# Patient Record
Sex: FEMALE | Race: WHITE | NOT HISPANIC OR LATINO | Employment: FULL TIME | URBAN - METROPOLITAN AREA
[De-identification: names, ages, dates, MRNs, and addresses within clinical notes are randomized per-mention and may not be internally consistent; named-entity substitution may affect disease eponyms.]

---

## 2017-02-03 ENCOUNTER — LAB VISIT (OUTPATIENT)
Dept: LAB | Facility: HOSPITAL | Age: 52
End: 2017-02-03
Attending: INTERNAL MEDICINE
Payer: COMMERCIAL

## 2017-02-03 DIAGNOSIS — Z29.9 PREVENTIVE MEASURE: ICD-10-CM

## 2017-02-03 DIAGNOSIS — Z11.59 NEED FOR HEPATITIS C SCREENING TEST: ICD-10-CM

## 2017-02-03 LAB
25(OH)D3+25(OH)D2 SERPL-MCNC: 47 NG/ML
ALBUMIN SERPL BCP-MCNC: 3.3 G/DL
ALP SERPL-CCNC: 115 U/L
ALT SERPL W/O P-5'-P-CCNC: 13 U/L
ANION GAP SERPL CALC-SCNC: 7 MMOL/L
AST SERPL-CCNC: 17 U/L
BASOPHILS # BLD AUTO: 0.02 K/UL
BASOPHILS NFR BLD: 0.4 %
BILIRUB SERPL-MCNC: 0.6 MG/DL
BUN SERPL-MCNC: 16 MG/DL
CALCIUM SERPL-MCNC: 9.2 MG/DL
CHLORIDE SERPL-SCNC: 100 MMOL/L
CHOLEST/HDLC SERPL: 3.7 {RATIO}
CO2 SERPL-SCNC: 30 MMOL/L
CREAT SERPL-MCNC: 1.2 MG/DL
DIFFERENTIAL METHOD: NORMAL
EOSINOPHIL # BLD AUTO: 0.1 K/UL
EOSINOPHIL NFR BLD: 1.2 %
ERYTHROCYTE [DISTWIDTH] IN BLOOD BY AUTOMATED COUNT: 13.5 %
EST. GFR  (AFRICAN AMERICAN): 59.6 ML/MIN/1.73 M^2
EST. GFR  (NON AFRICAN AMERICAN): 51.7 ML/MIN/1.73 M^2
GLUCOSE SERPL-MCNC: 80 MG/DL
HCT VFR BLD AUTO: 41 %
HDL/CHOLESTEROL RATIO: 27.2 %
HDLC SERPL-MCNC: 250 MG/DL
HDLC SERPL-MCNC: 68 MG/DL
HGB BLD-MCNC: 14.1 G/DL
LDLC SERPL CALC-MCNC: 157 MG/DL
LYMPHOCYTES # BLD AUTO: 1.2 K/UL
LYMPHOCYTES NFR BLD: 22.1 %
MCH RBC QN AUTO: 30.6 PG
MCHC RBC AUTO-ENTMCNC: 34.4 %
MCV RBC AUTO: 89 FL
MONOCYTES # BLD AUTO: 0.5 K/UL
MONOCYTES NFR BLD: 8.6 %
NEUTROPHILS # BLD AUTO: 3.8 K/UL
NEUTROPHILS NFR BLD: 67.5 %
NONHDLC SERPL-MCNC: 182 MG/DL
PLATELET # BLD AUTO: 200 K/UL
PMV BLD AUTO: 9.7 FL
POTASSIUM SERPL-SCNC: 4.4 MMOL/L
PROT SERPL-MCNC: 6.9 G/DL
RBC # BLD AUTO: 4.61 M/UL
SODIUM SERPL-SCNC: 137 MMOL/L
TRIGL SERPL-MCNC: 125 MG/DL
TSH SERPL DL<=0.005 MIU/L-ACNC: 1.74 UIU/ML
WBC # BLD AUTO: 5.61 K/UL

## 2017-02-03 PROCEDURE — 82306 VITAMIN D 25 HYDROXY: CPT

## 2017-02-03 PROCEDURE — 85025 COMPLETE CBC W/AUTO DIFF WBC: CPT

## 2017-02-03 PROCEDURE — 84443 ASSAY THYROID STIM HORMONE: CPT

## 2017-02-03 PROCEDURE — 86803 HEPATITIS C AB TEST: CPT

## 2017-02-03 PROCEDURE — 80053 COMPREHEN METABOLIC PANEL: CPT

## 2017-02-03 PROCEDURE — 36415 COLL VENOUS BLD VENIPUNCTURE: CPT | Mod: PO

## 2017-02-03 PROCEDURE — 80061 LIPID PANEL: CPT

## 2017-02-06 LAB — HCV AB SERPL QL IA: NEGATIVE

## 2017-02-10 ENCOUNTER — OFFICE VISIT (OUTPATIENT)
Dept: INTERNAL MEDICINE | Facility: CLINIC | Age: 52
End: 2017-02-10
Payer: COMMERCIAL

## 2017-02-10 VITALS
DIASTOLIC BLOOD PRESSURE: 74 MMHG | SYSTOLIC BLOOD PRESSURE: 118 MMHG | HEART RATE: 78 BPM | HEIGHT: 64 IN | BODY MASS INDEX: 36.28 KG/M2 | OXYGEN SATURATION: 98 % | TEMPERATURE: 97 F | WEIGHT: 212.5 LBS

## 2017-02-10 DIAGNOSIS — F41.9 ANXIETY AND DEPRESSION: Primary | ICD-10-CM

## 2017-02-10 DIAGNOSIS — G47.33 OSA (OBSTRUCTIVE SLEEP APNEA): ICD-10-CM

## 2017-02-10 DIAGNOSIS — F32.A ANXIETY AND DEPRESSION: Primary | ICD-10-CM

## 2017-02-10 DIAGNOSIS — J45.990 ASTHMA, EXERCISE INDUCED: ICD-10-CM

## 2017-02-10 DIAGNOSIS — E55.9 VITAMIN D DEFICIENCY: ICD-10-CM

## 2017-02-10 DIAGNOSIS — E03.9 ACQUIRED HYPOTHYROIDISM: ICD-10-CM

## 2017-02-10 DIAGNOSIS — Z00.00 ENCOUNTER FOR PREVENTIVE HEALTH EXAMINATION: ICD-10-CM

## 2017-02-10 DIAGNOSIS — E78.00 PURE HYPERCHOLESTEROLEMIA: ICD-10-CM

## 2017-02-10 PROCEDURE — 99396 PREV VISIT EST AGE 40-64: CPT | Mod: S$GLB,,, | Performed by: INTERNAL MEDICINE

## 2017-02-10 PROCEDURE — 99999 PR PBB SHADOW E&M-EST. PATIENT-LVL III: CPT | Mod: PBBFAC,,, | Performed by: INTERNAL MEDICINE
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
## 2017-02-10 NOTE — MR AVS SNAPSHOT
[facility]  [address] [facility] [address] [place] [address]  Phone: [phone]  Fax: [phone]                  Taryn Serrano   2/10/2017 1:00 PM   Office Visit    Description:  Female : 1965   Provider:  Alyce Schroeder MD   Department:  [facility]           Reason for Visit     Follow-up           Diagnoses this Visit        Comments    Anxiety and depression    -  Primary     Acquired hypothyroidism         Asthma, exercise induced         URBAN (obstructive sleep apnea)         Vitamin D deficiency         Encounter for preventive health examination         Pure hypercholesterolemia                To Do List           Future Appointments        Provider Department Dept Phone    3/22/2017 7:45 AM SUM MAMMO1-SCR [facility] [phone]    3/22/2017 8:15 AM Ayan Shaffer MD [facility] OB/ -[phone]    2017 9:30 AM LABORATORY, [facility] [phone]    2017 1:00 PM Alyce Schroeder MD [facility] [phone]      Goals (5 Years of Data)     None      Follow-Up and Disposition     Return in about 6 months (around 8/10/2017).    Follow-up and Disposition History      [facility]     [facility] On [facility] Nurse Care Line -  Assistance  Registered nurses in the [facility] Center provide clinical advisement, health education, appointment booking, and other advisory services.  Call for this free service at [phone].             Medications           Message regarding Medications     Verify the changes and/or additions to your medication regime listed below are the same as discussed with your clinician today.  If any of these changes or additions are incorrect, please notify your healthcare provider.        STOP taking these medications     amoxicillin-clavulanate 500-125mg (AUGMENTIN) 500-125 mg Tab Take 1 tablet (500 mg total) by mouth 2 (two) times daily. 1 Tablet(s) Oral PRN  "Twice a day.           Verify that the below list of medications is an accurate representation of the medications you are currently taking.  If none reported, the list may be blank. If incorrect, please contact your healthcare provider. Carry this list with you in case of emergency.           Current Medications     albuterol (VENTOLIN HFA) 90 mcg/actuation inhaler Inhale 2 puffs into the lungs every 6 (six) hours as needed for Wheezing. 2 Puff(s) Inhalation PRN Four times a day.    buPROPion (WELLBUTRIN XL) 150 MG TB24 tablet TAKE 1 TABLET (150 MG TOTAL) BY MOUTH ONCE DAILY.    cholecalciferol, vitamin D3, 2,000 unit Cap Take 1 capsule (2,000 Units total) by mouth once daily.    fluoxetine (PROZAC) 40 MG capsule TAKE 2 CAPSULES (80 MG TOTAL) BY MOUTH ONCE DAILY.    fluticasone (FLONASE) 50 mcg/actuation nasal spray 2 sprays by Each Nare route once daily.    fluticasone-salmeterol 500-50 mcg/dose (ADVAIR DISKUS) 500-50 mcg/dose DsDv diskus inhaler Inhale 1 puff into the lungs 2 (two) times daily.    levothyroxine (SYNTHROID) 112 MCG tablet Take 1 tablet (112 mcg total) by mouth once daily.           Clinical Reference Information           Your Vitals Were     BP Pulse Temp Height Weight Last Period    118/74 (BP Location: Right arm) 78 97.2 °F (36.2 °C) (Tympanic) 5' 4" (1.626 m) 96.4 kg (212 lb 8.4 oz) 01/07/2015    SpO2 BMI             98% 36.48 kg/m2         Blood Pressure          Most Recent Value    BP  118/74      Allergies as of 2/10/2017     Grass Pollen-perennial Rye, Standard    Horsetail (Equisetum Arvense)    Mold Extracts      Immunizations Administered on Date of Encounter - 2/10/2017     None      Orders Placed During Today's Visit      Normal Orders This Visit    Ambulatory consult to Gynecology     Future Labs/Procedures Expected by Expires    Lipid panel  2/10/2017 2/10/2018    Mammo Digital Screening Bilat with CAD  2/10/2017 2/10/2018      Language Assistance Services     ATTENTION: Language " assistance services are available, free of charge. Please call [phone].      ATENCIÓN: Si habla phani, tiene a gallagher disposición servicios gratuitos de asistencia lingüística. Llame al [phone].     CHÚ Ý: N?u b?n nói Ti?ng Vi?t, có các d?ch v? h? tr? ngôn ng? mi?n phí dành cho b?n. G?i s? [phone].         [facility] complies with applicable Federal civil rights laws and does not discriminate on the basis of race, color, national origin, age, disability, or sex.

## 2017-03-03 ENCOUNTER — PATIENT MESSAGE (OUTPATIENT)
Dept: INTERNAL MEDICINE | Facility: CLINIC | Age: 52
End: 2017-03-03

## 2017-03-03 ENCOUNTER — PATIENT MESSAGE (OUTPATIENT)
Dept: OPHTHALMOLOGY | Facility: CLINIC | Age: 52
End: 2017-03-03

## 2017-03-22 ENCOUNTER — OFFICE VISIT (OUTPATIENT)
Dept: OBSTETRICS AND GYNECOLOGY | Facility: CLINIC | Age: 52
End: 2017-03-22
Payer: COMMERCIAL

## 2017-03-22 ENCOUNTER — HOSPITAL ENCOUNTER (OUTPATIENT)
Dept: RADIOLOGY | Facility: HOSPITAL | Age: 52
Discharge: HOME | End: 2017-03-22
Attending: INTERNAL MEDICINE
Payer: COMMERCIAL

## 2017-03-22 ENCOUNTER — PATIENT MESSAGE (OUTPATIENT)
Dept: INTERNAL MEDICINE | Facility: CLINIC | Age: 52
End: 2017-03-22

## 2017-03-22 VITALS
HEIGHT: 64 IN | DIASTOLIC BLOOD PRESSURE: 68 MMHG | WEIGHT: 216.06 LBS | SYSTOLIC BLOOD PRESSURE: 100 MMHG | BODY MASS INDEX: 36.89 KG/M2

## 2017-03-22 DIAGNOSIS — Z00.00 ENCOUNTER FOR PREVENTIVE HEALTH EXAMINATION: ICD-10-CM

## 2017-03-22 DIAGNOSIS — Z01.419 WOMEN'S ANNUAL ROUTINE GYNECOLOGICAL EXAMINATION: Primary | ICD-10-CM

## 2017-03-22 PROCEDURE — 77067 SCR MAMMO BI INCL CAD: CPT | Mod: 26,,, | Performed by: RADIOLOGY

## 2017-03-22 PROCEDURE — 77067 SCR MAMMO BI INCL CAD: CPT | Mod: TC

## 2017-03-22 PROCEDURE — 99999 PR PBB SHADOW E&M-EST. PATIENT-LVL III: CPT | Mod: PBBFAC,,, | Performed by: OBSTETRICS & GYNECOLOGY

## 2017-03-22 PROCEDURE — 88175 CYTOPATH C/V AUTO FLUID REDO: CPT

## 2017-03-22 PROCEDURE — 99396 PREV VISIT EST AGE 40-64: CPT | Mod: S$GLB,,, | Performed by: OBSTETRICS & GYNECOLOGY
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
## 2017-03-22 NOTE — LETTER
March 22, 2017      Alyce Schroeder MD  [address] [place] [address]           [facility]  [address] [place] [address]  Phone: [phone]  Fax: [phone]          Patient: Taryn Serrano   MR Number: [id number]   YOB: 1965   Date of Visit: 3/22/2017       Dear Dr. Alyce Schroeder:    Thank you for referring Taryn Serrano to me for evaluation. Attached you will find relevant portions of my assessment and plan of care.    If you have questions, please do not hesitate to call me. I look forward to following Taryn Serrano along with you.    Sincerely,    Ayan Shaffer MD    Enclosure  CC:  No Recipients    If you would like to receive this communication electronically, please contact [email or web address] or [phone] to request more information on [facility] Link access.    For providers and/or their staff who would like to refer a patient to [facility], please contact us through our one-stop-shop provider referral line, [facility], at [phone].    If you feel you have received this communication in error or would no longer like to receive these types of communications, please e-mail [email or web address]

## 2017-03-24 ENCOUNTER — HOSPITAL ENCOUNTER (OUTPATIENT)
Dept: RADIOLOGY | Facility: HOSPITAL | Age: 52
Discharge: HOME | End: 2017-03-24
Attending: PEDIATRICS
Payer: COMMERCIAL

## 2017-03-24 ENCOUNTER — OFFICE VISIT (OUTPATIENT)
Dept: INTERNAL MEDICINE | Facility: CLINIC | Age: 52
End: 2017-03-24
Payer: COMMERCIAL

## 2017-03-24 VITALS
HEIGHT: 64 IN | SYSTOLIC BLOOD PRESSURE: 100 MMHG | DIASTOLIC BLOOD PRESSURE: 64 MMHG | WEIGHT: 215.19 LBS | TEMPERATURE: 97 F | BODY MASS INDEX: 36.74 KG/M2

## 2017-03-24 DIAGNOSIS — M25.562 ACUTE PAIN OF LEFT KNEE: ICD-10-CM

## 2017-03-24 DIAGNOSIS — M25.562 ACUTE PAIN OF LEFT KNEE: Primary | ICD-10-CM

## 2017-03-24 PROBLEM — E66.9 OBESITY, CLASS II, BMI 35-39.9, NO COMORBIDITY: Status: ACTIVE | Noted: 2017-03-24

## 2017-03-24 PROBLEM — E66.812 OBESITY, CLASS II, BMI 35-39.9, NO COMORBIDITY: Status: ACTIVE | Noted: 2017-03-24

## 2017-03-24 PROCEDURE — 1160F RVW MEDS BY RX/DR IN RCRD: CPT | Mod: S$GLB,,, | Performed by: PHYSICIAN ASSISTANT

## 2017-03-24 PROCEDURE — 99213 OFFICE O/P EST LOW 20 MIN: CPT | Mod: S$GLB,,, | Performed by: PHYSICIAN ASSISTANT

## 2017-03-24 PROCEDURE — 73560 X-RAY EXAM OF KNEE 1 OR 2: CPT | Mod: 26,59,RT, | Performed by: RADIOLOGY

## 2017-03-24 PROCEDURE — 99999 PR PBB SHADOW E&M-EST. PATIENT-LVL III: CPT | Mod: PBBFAC,,, | Performed by: PHYSICIAN ASSISTANT

## 2017-03-24 PROCEDURE — 73560 X-RAY EXAM OF KNEE 1 OR 2: CPT | Mod: TC,59,PO,RT

## 2017-03-24 PROCEDURE — 73562 X-RAY EXAM OF KNEE 3: CPT | Mod: 26,LT,, | Performed by: RADIOLOGY

## 2017-03-24 RX ORDER — IBUPROFEN 200 MG
200 TABLET ORAL
COMMUNITY
End: 2018-02-12 | Stop reason: ALTCHOICE

## 2017-03-24 RX ORDER — NAPROXEN SODIUM 550 MG/1
550 TABLET ORAL 2 TIMES DAILY WITH MEALS
Qty: 20 TABLET | Refills: 2 | Status: SHIPPED | OUTPATIENT
Start: 2017-03-24 | End: 2017-04-14

## 2017-03-24 NOTE — PROGRESS NOTES
Subjective:       Patient ID: Taryn Serrano is a 51 y.o.W/ female.    Chief Complaint: Knee Pain (Left, X 1 month)    HPI          She comes in by herself today and has the above problem.  About 1 month ago she was climbing into the back of a pickup truck and her left knee became painful and she nearly collapsed and fell.  Several times since then the knee has wanted to give way and collapse.  This occurs mostly when she is coming down stairs as opposed to going upstairs.  Almost immediately, the same day after her injury she started icing her knee.  Also has been taking 600 mg OTC Motrin with some benefit but she worries it make cause stomach irritation if she takes too much.  She probably is not taking a sufficient daily quantity at this point because of that reason.  She she also has been wearing a sturdy knee brace with side stays.  She can't say that that he has been making any noise like pops or clicks.  She doesn't have to sit with her knee in extension to be comfortable.  She can sit with it and a 90° flexed position with no pain.        She gives me a history that several years ago she had her right knee operated on by her arthroscopy to fix her anterior cruciate ligament.  She injured that while she was trying to make her throat when softball.  She has done very fine with that knee since her surgery.      Review of Systems    Otherwise negative concerning the ORTHOPEDIC, MUSCULOSKELETAL, and RHEUMATOLOGIC system review.    Objective:      Physical Exam    KNEES: FROM.  She has full extension and flexion of both knees.  There is no palpable effusion to either knee in the prepatellar or the popliteal area.  She does have a slightly more thickened left knee than the right knee.  There is no increased heat or inflammation.  All signs are negative including patellar, spring, collateral, drawer, and Rosalia's.  There might a been a little clicking just left of the patella on full flexion of the left  knee.    Assessment:       1. Acute pain of left knee        Plan:     1.  Will change her NSAID from Motrin to Anaprox 550 mg every 12 hours with food.  2.  Continue with her knee brace.  She also use hot compresses or heating pad to help increase circulation to heal and knee.  3.  Will get routine knee x-ray today and schedule her for an MRI of that knee in the near future.  Follow-up after that test is done.

## 2017-03-28 ENCOUNTER — TELEPHONE (OUTPATIENT)
Dept: OBSTETRICS AND GYNECOLOGY | Facility: CLINIC | Age: 52
End: 2017-03-28

## 2017-03-28 NOTE — TELEPHONE ENCOUNTER
----- Message from Ayan Shaffer MD sent at 3/28/2017  6:56 AM CDT -----  Please call pt and let her know her pap was normal

## 2017-04-04 ENCOUNTER — HOSPITAL ENCOUNTER (OUTPATIENT)
Dept: RADIOLOGY | Facility: HOSPITAL | Age: 52
Discharge: HOME | End: 2017-04-04
Attending: PEDIATRICS
Payer: COMMERCIAL

## 2017-04-04 DIAGNOSIS — M25.562 ACUTE PAIN OF LEFT KNEE: ICD-10-CM

## 2017-04-04 PROCEDURE — 73721 MRI JNT OF LWR EXTRE W/O DYE: CPT | Mod: TC,LT

## 2017-04-07 ENCOUNTER — OFFICE VISIT (OUTPATIENT)
Dept: ORTHOPEDICS | Facility: CLINIC | Age: 52
End: 2017-04-07
Payer: COMMERCIAL

## 2017-04-07 VITALS
HEIGHT: 64 IN | HEART RATE: 72 BPM | WEIGHT: 215.19 LBS | RESPIRATION RATE: 12 BRPM | SYSTOLIC BLOOD PRESSURE: 109 MMHG | BODY MASS INDEX: 36.74 KG/M2 | DIASTOLIC BLOOD PRESSURE: 77 MMHG

## 2017-04-07 DIAGNOSIS — M25.562 ACUTE PAIN OF LEFT KNEE: ICD-10-CM

## 2017-04-07 DIAGNOSIS — S83.242A TEAR OF MEDIAL MENISCUS OF LEFT KNEE, UNSPECIFIED TEAR TYPE, UNSPECIFIED WHETHER OLD OR CURRENT TEAR, INITIAL ENCOUNTER: ICD-10-CM

## 2017-04-07 DIAGNOSIS — S83.512A LEFT ACL TEAR: Primary | ICD-10-CM

## 2017-04-07 PROCEDURE — 99999 PR PBB SHADOW E&M-EST. PATIENT-LVL IV: CPT | Mod: PBBFAC,,, | Performed by: PHYSICIAN ASSISTANT

## 2017-04-07 PROCEDURE — 99203 OFFICE O/P NEW LOW 30 MIN: CPT | Mod: S$GLB,,, | Performed by: PHYSICIAN ASSISTANT

## 2017-04-07 PROCEDURE — 1160F RVW MEDS BY RX/DR IN RCRD: CPT | Mod: S$GLB,,, | Performed by: PHYSICIAN ASSISTANT

## 2017-04-07 NOTE — LETTER
April 7, 2017      Beto Juan PA-C  [address] [place] [address]           [facility]  [address]  [place] [address]  Phone: [phone]  Fax: [phone]          Patient: Taryn Serrano   MR Number: [id number]   YOB: 1965   Date of Visit: 4/7/2017       Dear Beto Juan:    Thank you for referring Taryn Serrano to me for evaluation. Attached you will find relevant portions of my assessment and plan of care.    If you have questions, please do not hesitate to call me. I look forward to following Taryn Serrano along with you.    Sincerely,    Yeny Rahman PA-C    Enclosure  CC:  No Recipients    If you would like to receive this communication electronically, please contact [email or web address] or [phone] to request more information on [facility] Link access.    For providers and/or their staff who would like to refer a patient to [facility], please contact us through our one-stop-shop provider referral line, [facility], at [phone].    If you feel you have received this communication in error or would no longer like to receive these types of communications, please e-mail [email or web address]

## 2017-04-07 NOTE — PROGRESS NOTES
Subjective:      Patient ID: Taryn Serrano is a 51 y.o. female.    Chief Complaint: Pain and Injury of the Left Knee      HPI: Taryn Serrano  is a 51 y.o. female who c/o Pain and Injury of the Left Knee   for duration of about 6 weeks.  She was climbing into a fifth wheel trailer and stepping up with her left knee.  She had sharp pain medially with that and some instability where the knee gave out on her.  She tells me approximately 2 years ago she injured the left knee when she fell.  Ever since then it has given out on her a few times here and there.  She continues to play tennis, I'll be it not very often per her report.  Pain level today is 0 out of 10.  She does have an occasional intermittent soreness and stiffness.  Alleviating factors include neoprene hinged knee brace.  Aggravating factors is out on her.  She was given a prescription of naproxen, but has not yet started taking it.    Review of Systems   Constitution: Negative for fever.   HENT: Negative for headaches.    Cardiovascular: Negative for chest pain.   Respiratory: Negative for cough and shortness of breath.    Skin: Negative for rash.   Musculoskeletal: Positive for joint pain and stiffness. Negative for joint swelling.        Ocassional Left knee instability   Gastrointestinal: Negative for heartburn.   Neurological: Negative for numbness.         Objective:        General    Nursing note and vitals reviewed.  Constitutional: She is oriented to person, place, and time. She appears well-developed and well-nourished.   HENT:   Head: Normocephalic and atraumatic.   Eyes: EOM are normal.   Cardiovascular: Normal rate and regular rhythm.    Pulmonary/Chest: Effort normal.   Abdominal: Soft.   Neurological: She is alert and oriented to person, place, and time.   Psychiatric: She has a normal mood and affect. Her behavior is normal.             Left Knee Exam     Inspection   Erythema: absent  Swelling: absent  Effusion: absent  Deformity:  deformity  Bruising: absent    Tenderness   The patient tender to palpation of the medial joint line and condyle.    Range of Motion   Extension: normal   Flexion: abnormal Left knee flexion: Pain and stiffness with deep flexion.     Tests   Meniscus   Rosalia:  Medial - positive Lateral - negative  Stability   Lachman: abnormal  - grade IIPCL-Posterior Drawer: normal (0 to 2mm)  MCL - Valgus: normal (0 to 2mm)  LCL - Varus: normal (0 to 2mm)  Patella   Patellar Apprehension: negative  Patellar Tracking: normal  Patellar Grind: negative    Other   Meniscal Cyst: absent  Popliteal (Baker's) Cyst: absent  Sensation: normal    Comments:  Comp soft, cap refill < 2 sec.    Muscle Strength   Left Lower Extremity   Quadriceps:  5/5   Hamstrin/5     Vascular Exam       Edema  Left Lower Leg: absent            Xray:   Left knee from 3/25/17 images and report were reviewed today.  I agree with the radiologist's interpretation.  There is a very minimal joint space narrowing involving the medial compartment of either knee.  No acute fracture or dislocation.  No joint effusion.    MRI:   Left knee from 2017 images and report were reviewed today.  I agree with the radiologist's interpretation.   Complete full-thickness ACL tear.  Possibly chronic.  Intact PCL.  Chondromalacia medial knee compartment.  Medial meniscal degeneration with near complete full thickness radial tear of the posterior horn root attachment.  Popliteal fossa cyst.  Prepatellar edema.  Small joint effusion.      Assessment:       Encounter Diagnoses   Name Primary?    Left ACL tear Yes    Tear of medial meniscus of left knee, unspecified tear type, unspecified whether old or current tear, initial encounter     Acute pain of left knee           Plan:       Taryn was seen today for pain and injury.    Diagnoses and all orders for this visit:    Left ACL tear  -     Ambulatory Referral to Physical/Occupational Therapy    Tear of medial meniscus of  left knee, unspecified tear type, unspecified whether old or current tear, initial encounter  -     Ambulatory Referral to Physical/Occupational Therapy    Acute pain of left knee  -     Ambulatory Referral to Physical/Occupational Therapy    Ms. Centeno comes in today for discussion of her left knee.  This is a new problem to me.  She does have some acute knee pain symptoms with positive findings for meniscal tear on MRI and exam.  She also has some left knee instability with evidence of an anterior cruciate ligament disruption on MRI.  Given her age and activity level, she would like to try to avoid any sort of anterior cruciate ligament reconstruction.  We also discussed the possibility of a left knee scope for partial medial meniscectomy.  She would like to try and avoid surgery for that as well if at all possible.  She is more interested in conservative treatment options, especially given that she is not having much pain at this time.  Recommend she continue with her knee brace.  She will start the prescription for naproxen that she was given.  She should take it with food.  I will get her started in formal physical therapy for aggressive quad strengthening, range of motion, as well as modalities for pain.  I will have her follow-up with Dr. Daly and Brit Green PA-C in about a month for further discussion on whether or not she would benefit from an anterior cruciate ligament reconstruction versus a partial meniscectomy on the medial side.  In the meantime, she will pay attention to how often the knee is giving out on her and what activities she is limited by secondary to instability.  Patient verbalizes understanding and agrees with the above.    Return in about 4 weeks (around 5/5/2017) for surgical consult with Dr. Daly and Brit Green PA-C.          The patient understands, chooses and consents to this plan and accepts all   the risks which include but are not limited to the risks mentioned above.      Disclaimer: This note was prepared using a voice recognition system and is likely to have sound alike errors within the text.

## 2017-05-15 ENCOUNTER — OFFICE VISIT (OUTPATIENT)
Dept: ORTHOPEDICS | Facility: CLINIC | Age: 52
End: 2017-05-15
Payer: COMMERCIAL

## 2017-05-15 VITALS
SYSTOLIC BLOOD PRESSURE: 96 MMHG | BODY MASS INDEX: 36.74 KG/M2 | DIASTOLIC BLOOD PRESSURE: 66 MMHG | WEIGHT: 215.19 LBS | HEIGHT: 64 IN | HEART RATE: 72 BPM

## 2017-05-15 DIAGNOSIS — S83.512A LEFT ACL TEAR: Primary | ICD-10-CM

## 2017-05-15 DIAGNOSIS — S83.242A TEAR OF MEDIAL MENISCUS OF LEFT KNEE, UNSPECIFIED TEAR TYPE, UNSPECIFIED WHETHER OLD OR CURRENT TEAR, INITIAL ENCOUNTER: ICD-10-CM

## 2017-05-15 DIAGNOSIS — M71.22 BAKERS CYST, LEFT: ICD-10-CM

## 2017-05-15 PROCEDURE — 99999 PR PBB SHADOW E&M-EST. PATIENT-LVL III: CPT | Mod: PBBFAC,,, | Performed by: ORTHOPAEDIC SURGERY

## 2017-05-15 PROCEDURE — 99214 OFFICE O/P EST MOD 30 MIN: CPT | Mod: S$GLB,,, | Performed by: ORTHOPAEDIC SURGERY

## 2017-05-15 PROCEDURE — 1160F RVW MEDS BY RX/DR IN RCRD: CPT | Mod: S$GLB,,, | Performed by: ORTHOPAEDIC SURGERY

## 2017-05-15 NOTE — MR AVS SNAPSHOT
[facility]  [address]  [place] [address]  Phone: [phone]  Fax: [phone]                  Taryn Serrano   5/15/2017 9:45 AM   Office Visit    Description:  Female : 1965   Provider:  Kenan Daly MD   Department:  [facility]           Reason for Visit     Left Knee - Pain           Diagnoses this Visit        Comments    Left ACL tear    -  Primary     Tear of medial meniscus of left knee, unspecified tear type, unspecified whether old or current tear, initial encounter         Bakers cyst, left                To Do List           Future Appointments        Provider Department Dept Phone    8/3/2017 9:30 AM ORTHOPEDICS NURSE, [place] [facility] [phone]    2017 9:30 AM LABORATORY, [place] [facility] [phone]    2017 1:00 PM Alyce Schroeder MD [facility] Internal Medicine [phone]      Goals (5 Years of Data)     None      Follow-Up and Disposition     Return in about 3 months (around 2017).    Follow-up and Disposition History      [facility]     [facility] Nurse Care Line -  Assistance  Unless otherwise directed by your provider, please contact [facility], our nurse care line that is available for  assistance.     Registered nurses in the [facility] Center provide: appointment scheduling, clinical advisement, health education, and other advisory services.  Call: [phone] (toll free)               Medications           Message regarding Medications     Verify the changes and/or additions to your medication regime listed below are the same as discussed with your clinician today.  If any of these changes or additions are incorrect, please notify your healthcare provider.             Verify that the below list of medications is an accurate representation of the medications you are currently taking.  If none reported, the list may be blank. If incorrect, please  "contact your healthcare provider. Carry this list with you in case of emergency.           Current Medications     albuterol (VENTOLIN HFA) 90 mcg/actuation inhaler Inhale 2 puffs into the lungs every 6 (six) hours as needed for Wheezing. 2 Puff(s) Inhalation PRN Four times a day.    buPROPion (WELLBUTRIN XL) 150 MG TB24 tablet TAKE 1 TABLET (150 MG TOTAL) BY MOUTH ONCE DAILY.    cholecalciferol, vitamin D3, 2,000 unit Cap Take 1 capsule (2,000 Units total) by mouth once daily.    fluoxetine (PROZAC) 40 MG capsule TAKE 2 CAPSULES (80 MG TOTAL) BY MOUTH ONCE DAILY.    fluticasone (FLONASE) 50 mcg/actuation nasal spray 2 sprays by Each Nare route once daily.    fluticasone-salmeterol 500-50 mcg/dose (ADVAIR DISKUS) 500-50 mcg/dose DsDv diskus inhaler Inhale 1 puff into the lungs 2 (two) times daily.    ibuprofen (ADVIL,MOTRIN) 200 MG tablet Take 200 mg by mouth as needed for Pain.           Clinical Reference Information           Your Vitals Were     BP Pulse Height Weight Last Period BMI    96/66 72 5' 4" (1.626 m) 97.6 kg (215 lb 2.7 oz) 01/07/2015 36.93 kg/m2      Blood Pressure          Most Recent Value    BP  96/66      Allergies as of 5/15/2017     Grass Pollen-perennial Rye, Standard    Horsetail (Equisetum Arvense)    Mold Extracts      Immunizations Administered on Date of Encounter - 5/15/2017     None      Language Assistance Services     ATTENTION: Language assistance services are available, free of charge. Please call [phone].      ATENCIÓN: Si angela jeronimo, tiene a gallagher disposición servicios gratuitos de asistencia lingüística. Llame al [phone].     CHÚ Ý: N?u b?n nói Ti?ng Vi?t, có các d?ch v? h? tr? ngôn ng? mi?n phí dành cho b?n. G?i s? [phone].         [facility] complies with applicable Federal civil rights laws and does not discriminate on the basis of race, color, national origin, age, disability, or sex.        "

## 2017-05-15 NOTE — PROGRESS NOTES
Subjective:      Patient ID: Taryn Serrano is a 51 y.o. female.    Chief Complaint: Pain of the Left Knee      HPI: Taryn Serrano  is a 51 y.o. female who c/o Pain of the Left Knee   for duration of about 6 weeks.  She was climbing into a fifth wheel trailer and stepping up with her left knee.  She had sharp pain medially with that and some instability where the knee gave out on her.  She tells me approximately 2 years ago she injured the left knee when she fell.  Ever since then it has given out on her a few times here and there. A recent MRI shows an ACL tear and medial meniscus tear. She does not trust her knee. She used to play tennis.      Review of Systems   Constitution: Negative for fever.   HENT: Negative for headaches.    Cardiovascular: Negative for chest pain.   Respiratory: Negative for cough and shortness of breath.    Skin: Negative for rash.   Musculoskeletal: Positive for joint pain and stiffness. Negative for joint swelling.        Ocassional Left knee instability   Gastrointestinal: Negative for heartburn.   Neurological: Negative for numbness.         Objective:        General    Nursing note and vitals reviewed.  Constitutional: She is oriented to person, place, and time. She appears well-developed and well-nourished.   HENT:   Head: Normocephalic and atraumatic.   Eyes: EOM are normal.   Cardiovascular: Normal rate and regular rhythm.    Pulmonary/Chest: Effort normal.   Abdominal: Soft.   Neurological: She is alert and oriented to person, place, and time.   Psychiatric: She has a normal mood and affect. Her behavior is normal.             Left Knee Exam     Inspection   Erythema: absent  Swelling: absent  Effusion: absent  Deformity: deformity  Bruising: absent    Tenderness   The patient tender to palpation of the medial joint line and condyle.    Range of Motion   Extension: normal   Flexion: abnormal Left knee flexion: Pain and stiffness with deep flexion.     Tests   Meniscus    Rosalia:  Medial - positive Lateral - negative  Stability   Lachman: abnormal  - grade IIPCL-Posterior Drawer: normal (0 to 2mm)  MCL - Valgus: normal (0 to 2mm)  LCL - Varus: normal (0 to 2mm)  Patella   Patellar Apprehension: negative  Patellar Tracking: normal  Patellar Grind: negative    Other   Meniscal Cyst: absent  Popliteal (Baker's) Cyst: absent  Sensation: normal    Comments:  Comp soft, cap refill < 2 sec.    Muscle Strength   Left Lower Extremity   Quadriceps:  5/5   Hamstrin/5     Vascular Exam       Edema  Left Lower Leg: absent            Xray:   Left knee from 3/25/17 images and report were reviewed today.  I agree with the radiologist's interpretation.  There is a very minimal joint space narrowing involving the medial compartment of either knee.  No acute fracture or dislocation.  No joint effusion.    MRI:   Left knee from 2017 images and report were reviewed today.  I agree with the radiologist's interpretation.   Complete full-thickness ACL tear.  Possibly chronic.  Intact PCL.  Chondromalacia medial knee compartment.  Medial meniscal degeneration with near complete full thickness radial tear of the posterior horn root attachment.  Popliteal fossa cyst.  Prepatellar edema.  Small joint effusion.      Assessment:       Encounter Diagnoses   Name Primary?    Left ACL tear Yes    Tear of medial meniscus of left knee, unspecified tear type, unspecified whether old or current tear, initial encounter     Bakers cyst, left           Plan:           Left ACL tear    Tear of medial meniscus of left knee, unspecified tear type, unspecified whether old or current tear, initial encounter    Bakers cyst, left    Plan:  ATS with ACL reconstruction using allograft and medial meniscectomy with debridement of baker's cyst.    Disclaimer: This note was prepared using a voice recognition system and is likely to have sound alike errors within the text.

## 2017-05-15 NOTE — LETTER
May 15, 2017      Yeny Rahman PA-C  [address] [address]           [facility]  [address] [place] [address]  Phone: [phone]  Fax: [phone]          Patient: Taryn Serrano   MR Number: [id number]   YOB: 1965   Date of Visit: 5/15/2017       Dear Yeny Rahman:    Thank you for referring Taryn Serrano to me for evaluation. Attached you will find relevant portions of my assessment and plan of care.    If you have questions, please do not hesitate to call me. I look forward to following Taryn Serrano along with you.    Sincerely,    Kenan Daly MD    Enclosure  CC:  No Recipients    If you would like to receive this communication electronically, please contact [email or web address] or [phone] to request more information on [facility] Link access.    For providers and/or their staff who would like to refer a patient to [facility], please contact us through our one-stop-shop provider referral line, [facility], at [phone].    If you feel you have received this communication in error or would no longer like to receive these types of communications, please e-mail [email or web address]

## 2017-05-30 DIAGNOSIS — S83.512A RUPTURE OF ANTERIOR CRUCIATE LIGAMENT OF LEFT KNEE, INITIAL ENCOUNTER: Primary | ICD-10-CM

## 2017-06-05 RX ORDER — LEVOTHYROXINE SODIUM 112 UG/1
112 TABLET ORAL DAILY
Qty: 30 TABLET | Refills: 11 | Status: SHIPPED | OUTPATIENT
Start: 2017-06-05 | End: 2018-06-20 | Stop reason: SDUPTHER

## 2017-06-06 DIAGNOSIS — F32.A ANXIETY AND DEPRESSION: ICD-10-CM

## 2017-06-06 DIAGNOSIS — F41.9 ANXIETY AND DEPRESSION: ICD-10-CM

## 2017-06-06 RX ORDER — FLUOXETINE HYDROCHLORIDE 40 MG/1
CAPSULE ORAL
Qty: 60 CAPSULE | Refills: 6 | Status: SHIPPED | OUTPATIENT
Start: 2017-06-06 | End: 2018-01-21 | Stop reason: SDUPTHER

## 2017-06-12 ENCOUNTER — PATIENT MESSAGE (OUTPATIENT)
Dept: SURGERY | Facility: HOSPITAL | Age: 52
End: 2017-06-12

## 2017-06-13 DIAGNOSIS — Z01.818 PREOPERATIVE TESTING: Primary | ICD-10-CM

## 2017-06-13 DIAGNOSIS — Z01.818 PREOP TESTING: Primary | ICD-10-CM

## 2017-07-03 DIAGNOSIS — F41.9 ANXIETY AND DEPRESSION: ICD-10-CM

## 2017-07-03 DIAGNOSIS — F32.A ANXIETY AND DEPRESSION: ICD-10-CM

## 2017-07-03 RX ORDER — BUPROPION HYDROCHLORIDE 150 MG/1
TABLET ORAL
Qty: 30 TABLET | Refills: 9 | Status: SHIPPED | OUTPATIENT
Start: 2017-07-03 | End: 2018-05-18 | Stop reason: SDUPTHER

## 2017-07-14 ENCOUNTER — PATIENT MESSAGE (OUTPATIENT)
Dept: INTERNAL MEDICINE | Facility: CLINIC | Age: 52
End: 2017-07-14

## 2017-07-14 ENCOUNTER — OFFICE VISIT (OUTPATIENT)
Dept: INTERNAL MEDICINE | Facility: CLINIC | Age: 52
End: 2017-07-14
Payer: COMMERCIAL

## 2017-07-14 VITALS
HEART RATE: 87 BPM | WEIGHT: 225.5 LBS | SYSTOLIC BLOOD PRESSURE: 98 MMHG | TEMPERATURE: 96 F | BODY MASS INDEX: 38.5 KG/M2 | HEIGHT: 64 IN | OXYGEN SATURATION: 97 % | DIASTOLIC BLOOD PRESSURE: 64 MMHG

## 2017-07-14 DIAGNOSIS — J06.9 ACUTE URI: Primary | ICD-10-CM

## 2017-07-14 DIAGNOSIS — J45.990 ASTHMA, EXERCISE INDUCED: ICD-10-CM

## 2017-07-14 PROCEDURE — 99213 OFFICE O/P EST LOW 20 MIN: CPT | Mod: 25,S$GLB,, | Performed by: PHYSICIAN ASSISTANT

## 2017-07-14 PROCEDURE — 96372 THER/PROPH/DIAG INJ SC/IM: CPT | Mod: S$GLB,,, | Performed by: INTERNAL MEDICINE

## 2017-07-14 PROCEDURE — 99999 PR PBB SHADOW E&M-EST. PATIENT-LVL III: CPT | Mod: PBBFAC,,, | Performed by: PHYSICIAN ASSISTANT

## 2017-07-14 RX ORDER — ALBUTEROL SULFATE 90 UG/1
1-2 AEROSOL, METERED RESPIRATORY (INHALATION) EVERY 6 HOURS PRN
Qty: 18 G | Refills: 0 | Status: SHIPPED | OUTPATIENT
Start: 2017-07-14 | End: 2018-12-20 | Stop reason: SDUPTHER

## 2017-07-14 RX ORDER — METHYLPREDNISOLONE ACETATE 80 MG/ML
60 INJECTION, SUSPENSION INTRA-ARTICULAR; INTRALESIONAL; INTRAMUSCULAR; SOFT TISSUE
Status: COMPLETED | OUTPATIENT
Start: 2017-07-14 | End: 2017-07-14

## 2017-07-14 RX ADMIN — METHYLPREDNISOLONE ACETATE 60 MG: 80 INJECTION, SUSPENSION INTRA-ARTICULAR; INTRALESIONAL; INTRAMUSCULAR; SOFT TISSUE at 02:07

## 2017-07-14 NOTE — PROGRESS NOTES
Subjective:       Patient ID: Taryn Serrano is a 52 y.o. female.    Chief Complaint: Sinus Problem    52 year old female c/o B ear pain/congestion/itching, nasal congestion, sore throat, and mild dry cough X 5 days. She also reports possible mild SOB and feels like asthma may be flaring a little. She has been using her Advair as prescribed but has not tried albuterol for current sxs. Requests refill of albuterol inhaler. She reports no fever, chills, N/V, CP, rhinorrhea, or other medical complaints.     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 The problem has been gradually worsening. Neither side of throat is experiencing more pain than the other. There has been no fever. The pain is at a severity of 5/10. The pain is mild. Associated symptoms include congestion, coughing, ear pain, headaches, a hoarse voice, a plugged ear sensation and shortness of breath. Pertinent negatives include no abdominal pain, diarrhea, drooling, ear discharge, neck pain, stridor, swollen glands, trouble swallowing or vomiting. She has had no exposure to strep or mono. She has tried NSAIDs for the symptoms. The treatment provided mild relief.     Review of Systems   Constitutional: Negative for chills and fever.   HENT: Positive for congestion, ear pain, hoarse voice and sore throat. Negative for drooling, ear discharge, rhinorrhea and  trouble swallowing.    Respiratory: Positive for cough and shortness of breath. Negative for stridor.    Cardiovascular: Negative for chest pain, palpitations and leg swelling.   Gastrointestinal: Negative for abdominal pain, diarrhea, nausea and vomiting.   Musculoskeletal: Negative for neck pain.   Skin: Negative for rash.   Neurological: Positive for headaches. Negative for dizziness, weakness and numbness.   Psychiatric/Behavioral: Negative for confusion.       Objective:      Physical Exam   Constitutional: She is oriented to person, place, and time. She appears well-developed and well-nourished. No distress.   HENT:   Head: Normocephalic and atraumatic.   Right Ear: Ear canal normal. Tympanic membrane is bulging.   Left Ear: Ear canal normal. A middle ear effusion is present.   Nose: Right sinus exhibits no maxillary sinus tenderness and no frontal sinus tenderness. Left sinus exhibits no maxillary sinus tenderness and no frontal sinus tenderness.   Mouth/Throat: Oropharynx is clear and moist. No oropharyngeal exudate.   Minimal soft palate erythema   Eyes: EOM are normal. No scleral icterus.   Neck: Neck supple.   Cardiovascular: Normal rate and regular rhythm.    Pulmonary/Chest: Effort normal and breath sounds normal. No respiratory distress. She has no decreased breath sounds. She has no wheezes. She has no rhonchi. She has no rales.   Musculoskeletal: Normal range of motion. She exhibits no edema.   Lymphadenopathy:     She has no cervical adenopathy.   Neurological: She is alert and oriented to person, place, and time. No cranial nerve deficit.   Skin: Skin is warm and dry. No rash noted.   Psychiatric: She has a normal mood and affect. Her speech is normal and behavior is normal. Thought content normal.       Assessment:       1. Acute URI    2. Asthma        Plan:         Taryn was seen today for sinus problem.    Diagnoses and all orders for this visit:    Acute URI  -     methylPREDNISolone acetate  injection 60 mg; Inject 0.75 mLs (60 mg total) into the muscle one time.  Symptomatic care. Saline nasal spray PRN.     Asthma  -     albuterol (VENTOLIN HFA) 90 mcg/actuation inhaler; Inhale 1-2 puffs into the lungs every 6 (six) hours as needed for Wheezing.  -     methylPREDNISolone acetate injection 60 mg; Inject 0.75 mLs (60 mg total) into the muscle one time.  Continue Advair as prescribed.    Monitor for new or worsening sxs. RTC if sxs persist or worsen. ER if sxs become severe.    F/u with PCP as recommended for health management.

## 2017-08-01 ENCOUNTER — PATIENT MESSAGE (OUTPATIENT)
Dept: SURGERY | Facility: HOSPITAL | Age: 52
End: 2017-08-01
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
## 2017-08-11 ENCOUNTER — LAB VISIT (OUTPATIENT)
Dept: LAB | Facility: HOSPITAL | Age: 52
End: 2017-08-11
Attending: INTERNAL MEDICINE
Payer: COMMERCIAL

## 2017-08-11 ENCOUNTER — PATIENT MESSAGE (OUTPATIENT)
Dept: ORTHOPEDICS | Facility: CLINIC | Age: 52
End: 2017-08-11

## 2017-08-11 ENCOUNTER — OFFICE VISIT (OUTPATIENT)
Dept: INTERNAL MEDICINE | Facility: CLINIC | Age: 52
End: 2017-08-11
Payer: COMMERCIAL

## 2017-08-11 VITALS
DIASTOLIC BLOOD PRESSURE: 72 MMHG | HEIGHT: 64 IN | WEIGHT: 223.31 LBS | OXYGEN SATURATION: 95 % | SYSTOLIC BLOOD PRESSURE: 118 MMHG | HEART RATE: 82 BPM | BODY MASS INDEX: 38.12 KG/M2 | TEMPERATURE: 97 F

## 2017-08-11 DIAGNOSIS — E03.9 ACQUIRED HYPOTHYROIDISM: Primary | ICD-10-CM

## 2017-08-11 DIAGNOSIS — Z29.9 PREVENTIVE MEASURE: ICD-10-CM

## 2017-08-11 DIAGNOSIS — J45.990 ASTHMA, EXERCISE INDUCED: ICD-10-CM

## 2017-08-11 DIAGNOSIS — F41.9 ANXIETY AND DEPRESSION: ICD-10-CM

## 2017-08-11 DIAGNOSIS — E78.00 PURE HYPERCHOLESTEROLEMIA: ICD-10-CM

## 2017-08-11 DIAGNOSIS — F32.A ANXIETY AND DEPRESSION: ICD-10-CM

## 2017-08-11 DIAGNOSIS — G47.33 OSA (OBSTRUCTIVE SLEEP APNEA): ICD-10-CM

## 2017-08-11 LAB
CHOLEST/HDLC SERPL: 3 {RATIO}
HDL/CHOLESTEROL RATIO: 33 %
HDLC SERPL-MCNC: 227 MG/DL
HDLC SERPL-MCNC: 75 MG/DL
LDLC SERPL CALC-MCNC: 133.8 MG/DL
NONHDLC SERPL-MCNC: 152 MG/DL
TRIGL SERPL-MCNC: 91 MG/DL

## 2017-08-11 PROCEDURE — 3008F BODY MASS INDEX DOCD: CPT | Mod: S$GLB,,, | Performed by: INTERNAL MEDICINE

## 2017-08-11 PROCEDURE — 80061 LIPID PANEL: CPT

## 2017-08-11 PROCEDURE — 36415 COLL VENOUS BLD VENIPUNCTURE: CPT | Mod: PO

## 2017-08-11 PROCEDURE — 99999 PR PBB SHADOW E&M-EST. PATIENT-LVL III: CPT | Mod: PBBFAC,,, | Performed by: INTERNAL MEDICINE

## 2017-08-11 PROCEDURE — 99214 OFFICE O/P EST MOD 30 MIN: CPT | Mod: S$GLB,,, | Performed by: INTERNAL MEDICINE

## 2017-09-10 ENCOUNTER — PATIENT MESSAGE (OUTPATIENT)
Dept: INTERNAL MEDICINE | Facility: CLINIC | Age: 52
End: 2017-09-10

## 2017-09-10 ENCOUNTER — PATIENT MESSAGE (OUTPATIENT)
Dept: ORTHOPEDICS | Facility: CLINIC | Age: 52
End: 2017-09-10

## 2017-12-05 ENCOUNTER — CLINICAL SUPPORT (OUTPATIENT)
Dept: CARDIOLOGY | Facility: CLINIC | Age: 52
End: 2017-12-05
Payer: COMMERCIAL

## 2017-12-05 ENCOUNTER — HOSPITAL ENCOUNTER (OUTPATIENT)
Dept: RADIOLOGY | Facility: HOSPITAL | Age: 52
Discharge: HOME | End: 2017-12-05
Attending: ORTHOPAEDIC SURGERY
Payer: COMMERCIAL

## 2017-12-05 DIAGNOSIS — Z01.818 PREOP TESTING: ICD-10-CM

## 2017-12-05 PROCEDURE — 93000 ELECTROCARDIOGRAM COMPLETE: CPT | Mod: S$GLB,,, | Performed by: INTERNAL MEDICINE

## 2017-12-05 PROCEDURE — 71020 XR CHEST PA AND LATERAL PRE-OP: CPT | Mod: 26,,, | Performed by: RADIOLOGY

## 2017-12-05 PROCEDURE — 71020 XR CHEST PA AND LATERAL PRE-OP: CPT | Mod: TC

## 2017-12-11 NOTE — PRE-PROCEDURE INSTRUCTIONS
Pre op instructions reviewed with patient per phone:    To confirm, Your surgeon has instructed you:  Surgery is scheduled 12/14/17 at 0700.      Please report to [facility] 1st floor main lobby by 0530.   Pre admit office to call afternoon prior to surgery with final arrival time      INSTRUCTIONS IMPORTANT!!!  ¨ Do not eat, drink, or smoke after 12 midnight-including water. OK to brush teeth, no gum, candy or mints!    ¨ Take only these medicines with a small swallow of water-morning of surgery.  Wellbutrin, Prozac, Synthroid    ____  Do not wear makeup, including mascara.  ____  No powder, lotions or creams to surgical area.  ____  Please remove all jewelry, including piercings and leave at home.  ____  No money or valuables needed. Please leave at home.  ____  Please bring identification and insurance information to hospital.  ____  If going home the same day, arrange for a ride home. You will not be able to   drive if Anesthesia was used.  ____  Children, under 12 years old, must remain in the waiting room with an adult.  They are not allowed in patient areas.  ____  Wear loose fitting clothing. Allow for dressings, bandages.  ____  Stop Aspirin, Ibuprofen, Motrin and Aleve at least 5-7 days before surgery, unless otherwise instructed by your doctor, or the nurse.   You MAY use Tylenol/acetaminophen until day of surgery.  ____  If you take diabetic medication, do not take am of surgery unless instructed by   Doctor.  ____ Stop taking any Fish Oil supplement or any Vitamins that contain Vitamin E at least 5 days prior to surgery.          Bathing Instructions-- The night before surgery and the morning prior to coming to the hospital:   -Do not shave the surgical area.   -Shower and wash your hair and body as usual with anti-bacterial  soap and shampoo.   -Rinse your hair and body completely.   -Use one packet of hibiclens to wash the surgical site (using your hand) gently for 5 minutes.   Do not scrub you skin too hard.   -Do not use hibiclens on your head, face, or genitals.   -Do not wash with anti-bacterial soap after you use the hibiclens.   -Rinse your body thoroughly.   -Dry with clean, soft towel.  Do not use lotion, cream, deodorant, or powders on   the surgical site.    Use antibacterial soap in place of hibiclens if your surgery is on the head, face or genitals.         Surgical Site Infection    Prevention of surgical site infections:     -Keep incisions clean and dry.   -Do not soak/submerge incisions in water until completely healed.   -Do not apply lotions, powders, creams, or deodorants to site.   -Always make sure hands are cleaned with antibacterial soap/ alcohol-based   prior to touching the surgical site.  (This includes doctors, nurses, staff, and yourself.)    Signs and symptoms:   -Redness and pain around the area where you had surgery   -Drainage of cloudy fluid from your surgical wound   -Fever over 100.4  I have read or had read and explained to me, and understand the above information.

## 2017-12-13 ENCOUNTER — ANESTHESIA EVENT (OUTPATIENT)
Dept: SURGERY | Facility: HOSPITAL | Age: 52
End: 2017-12-13
Payer: COMMERCIAL

## 2017-12-13 NOTE — H&P
Subjective:     Patient is a 52 y.o. female presented with a history of left knee pain after climbing into a fifth wheel trailer.  This occurred she had localized pain to medial joint line with instability and her knee gave out on her.  She had a MRI which shows a medial meniscus tear and anterior cruciate ligament disruption.    Onset of symptoms was abrupt starting several months ago with unchanged course since that time. She is being admitted for outpatient surgical management of this condition.     Patient Active Problem List    Diagnosis Date Noted    Pure hypercholesterolemia 08/11/2017    Synovial cyst of left popliteal space 05/15/2017    Left ACL tear 05/15/2017    Tear of medial meniscus of left knee 05/15/2017    Obesity, Class II, BMI 35-39.9, no comorbidity 03/24/2017    Pseudophakia of both eyes 05/21/2014    Dry eye 05/21/2014    PCO (posterior capsular opacification) 05/21/2014    Asthma, exercise induced     URBAN (obstructive sleep apnea)     Acquired hypothyroidism     Anxiety and depression     Vitamin D deficiency     Circadian rhythm sleep disorder, irregular sleep-wake type 03/01/2011    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 11/16/2010    Periodic limb movement disorder 10/22/2010    Paving stone degeneration of peripheral retina 02/08/2010     Past Medical History:   Diagnosis Date    Anxiety and depression     Asthma, exercise induced     Dry eyes     History of ADHD     Hypothyroid     URBAN (obstructive sleep apnea)     Vitamin D deficiency       Past Surgical History:   Procedure Laterality Date    ANKLE SURGERY Right     CARPAL TUNNEL RELEASE Bilateral     CATARACT EXTRACTION BILATERAL W/ ANTERIOR VITRECTOMY      CATARACT EXTRACTION W/  INTRAOCULAR LENS IMPLANT  OU    KNEE SURGERY      right      No prescriptions prior to admission.     Review of patient's allergies indicates:   Allergen Reactions    Grass pollen-perennial rye, standard  Shortness Of Breath    Horsetail (equisetum arvense) Shortness Of Breath    Mold extracts Shortness Of Breath      Social History   Substance Use Topics    Smoking status: Never Smoker    Smokeless tobacco: Never Used    Alcohol use Yes      Comment: social      Family History   Problem Relation Age of Onset    Cataracts Father     Alzheimer's disease Father     Colon cancer Father     Heart failure Mother     Melanoma Neg Hx     Psoriasis Neg Hx     Lupus Neg Hx     Eczema Neg Hx     Acne Neg Hx     Breast cancer Neg Hx     Ovarian cancer Neg Hx     Thrombophilia Neg Hx       Review of Systems  A comprehensive review of systems was negative except for: Musculoskeletal: positive for arthralgias and stiff joints    Objective:     No data found.    General     Nursing note and vitals reviewed.  Constitutional: She is oriented to person, place, and time. She appears well-developed and well-nourished.   HENT:   Head: Normocephalic and atraumatic.   Eyes: EOM are normal.   Cardiovascular: Normal rate and regular rhythm.    Pulmonary/Chest: Effort normal.   Abdominal: Soft.   Neurological: She is alert and oriented to person, place, and time.   Psychiatric: She has a normal mood and affect. Her behavior is normal.            Left Knee Exam      Inspection   Erythema: absent  Swelling: absent  Effusion: absent  Deformity: deformity  Bruising: absent     Tenderness   The patient tender to palpation of the medial joint line and condyle.     Range of Motion   Extension: normal   Flexion: abnormal Left knee flexion: Pain and stiffness with deep flexion.      Tests   Meniscus   Rosalia:  Medial - positive Lateral - negative  Stability   Lachman: abnormal  - grade IIPCL-Posterior Drawer: normal (0 to 2mm)  MCL - Valgus: normal (0 to 2mm)  LCL - Varus: normal (0 to 2mm)  Patella   Patellar Apprehension: negative  Patellar Tracking: normal  Patellar Grind: negative     Other   Meniscal Cyst: absent  Popliteal  (Baker's) Cyst: absent  Sensation: normal     Comments:  Comp soft, cap refill < 2 sec.     Muscle Strength   Left Lower Extremity   Quadriceps:  5/5   Hamstrin/5      Vascular Exam         Edema  Left Lower Leg: absent       Imaging Review  Complete full-thickness ACL tear.  Possibly chronic.  Intact PCL.  Chondromalacia medial knee compartment.  Medial meniscal degeneration with near complete full thickness radial tear of the posterior horn root attachment.  Popliteal fossa cyst.  Prepatellar edema.  Small joint effusion.       Assessment:     Encounter Diagnoses   Name Primary?    Left ACL tear Yes    Tear of medial meniscus of left knee, unspecified tear type, unspecified whether old or current tear, initial encounter      Bakers cyst, left          Plan:     The various methods of treatment have been discussed with the patient and family.   After consideration of risks, benefits and other options for treatment, the patient has consented to surgical interventions.  She'll be scheduled for a left knee arthroscopy to address her medial meniscus tear and anterior cruciate ligament reconstruction on 2017.  The patient understands all material risks of surgery, what all is involved and desires to proceed.

## 2017-12-14 ENCOUNTER — HOSPITAL ENCOUNTER (OUTPATIENT)
Facility: HOSPITAL | Age: 52
Discharge: HOME | End: 2017-12-14
Attending: ORTHOPAEDIC SURGERY | Admitting: ORTHOPAEDIC SURGERY
Payer: COMMERCIAL

## 2017-12-14 ENCOUNTER — NURSE TRIAGE (OUTPATIENT)
Dept: ADMINISTRATIVE | Facility: CLINIC | Age: 52
End: 2017-12-14

## 2017-12-14 ENCOUNTER — SURGERY (OUTPATIENT)
Age: 52
End: 2017-12-14

## 2017-12-14 ENCOUNTER — ANESTHESIA (OUTPATIENT)
Dept: SURGERY | Facility: HOSPITAL | Age: 52
End: 2017-12-14
Payer: COMMERCIAL

## 2017-12-14 DIAGNOSIS — S83.512A LEFT ACL TEAR: Primary | ICD-10-CM

## 2017-12-14 DIAGNOSIS — S83.512A RUPTURE OF ANTERIOR CRUCIATE LIGAMENT OF LEFT KNEE, INITIAL ENCOUNTER: ICD-10-CM

## 2017-12-14 DIAGNOSIS — S83.242A TEAR OF MEDIAL MENISCUS OF LEFT KNEE, UNSPECIFIED TEAR TYPE, UNSPECIFIED WHETHER OLD OR CURRENT TEAR, INITIAL ENCOUNTER: ICD-10-CM

## 2017-12-14 PROCEDURE — 29881 ARTHRS KNE SRG MNISECTMY M/L: CPT | Mod: 51,LT,, | Performed by: ORTHOPAEDIC SURGERY

## 2017-12-14 PROCEDURE — 27800903 OPTIME MED/SURG SUP & DEVICES OTHER IMPLANTS: Performed by: ORTHOPAEDIC SURGERY

## 2017-12-14 PROCEDURE — 37000008 HC ANESTHESIA 1ST 15 MINUTES: Performed by: ORTHOPAEDIC SURGERY

## 2017-12-14 PROCEDURE — 71000033 HC RECOVERY, INTIAL HOUR: Performed by: ORTHOPAEDIC SURGERY

## 2017-12-14 PROCEDURE — 63600175 PHARM REV CODE 636 W HCPCS: Performed by: CLINIC/CENTER

## 2017-12-14 PROCEDURE — 29888 ARTHRS AID ACL RPR/AGMNTJ: CPT | Mod: AS,LT,, | Performed by: PHYSICIAN ASSISTANT

## 2017-12-14 PROCEDURE — C1713 ANCHOR/SCREW BN/BN,TIS/BN: HCPCS | Performed by: ORTHOPAEDIC SURGERY

## 2017-12-14 PROCEDURE — 63600175 PHARM REV CODE 636 W HCPCS: Performed by: ORTHOPAEDIC SURGERY

## 2017-12-14 PROCEDURE — 36000710: Performed by: ORTHOPAEDIC SURGERY

## 2017-12-14 PROCEDURE — 63600175 PHARM REV CODE 636 W HCPCS: Performed by: ANESTHESIOLOGY

## 2017-12-14 PROCEDURE — 25000003 PHARM REV CODE 250: Performed by: ORTHOPAEDIC SURGERY

## 2017-12-14 PROCEDURE — 25000003 PHARM REV CODE 250: Performed by: ANESTHESIOLOGY

## 2017-12-14 PROCEDURE — 37000009 HC ANESTHESIA EA ADD 15 MINS: Performed by: ORTHOPAEDIC SURGERY

## 2017-12-14 PROCEDURE — 27201423 OPTIME MED/SURG SUP & DEVICES STERILE SUPPLY: Performed by: ORTHOPAEDIC SURGERY

## 2017-12-14 PROCEDURE — 29881 ARTHRS KNE SRG MNISECTMY M/L: CPT | Mod: AS,51,LT, | Performed by: PHYSICIAN ASSISTANT

## 2017-12-14 PROCEDURE — 36000711: Performed by: ORTHOPAEDIC SURGERY

## 2017-12-14 PROCEDURE — 29888 ARTHRS AID ACL RPR/AGMNTJ: CPT | Mod: LT,,, | Performed by: ORTHOPAEDIC SURGERY

## 2017-12-14 PROCEDURE — 71000015 HC POSTOP RECOV 1ST HR: Performed by: ORTHOPAEDIC SURGERY

## 2017-12-14 PROCEDURE — 71000039 HC RECOVERY, EACH ADD'L HOUR: Performed by: ORTHOPAEDIC SURGERY

## 2017-12-14 PROCEDURE — 25000003 PHARM REV CODE 250: Performed by: CLINIC/CENTER

## 2017-12-14 PROCEDURE — 63600175 PHARM REV CODE 636 W HCPCS: Performed by: PHYSICIAN ASSISTANT

## 2017-12-14 DEVICE — KIT FIXATION BTB TIGHTROPE: Type: IMPLANTABLE DEVICE | Site: KNEE | Status: FUNCTIONAL

## 2017-12-14 DEVICE — BUTTON TIGHTROPE ABS 14MM RND: Type: IMPLANTABLE DEVICE | Site: KNEE | Status: FUNCTIONAL

## 2017-12-14 DEVICE — DEVICE FIXATION TIGHTROPE OPEN: Type: IMPLANTABLE DEVICE | Site: KNEE | Status: FUNCTIONAL

## 2017-12-14 DEVICE — IMPLANTABLE DEVICE: Type: IMPLANTABLE DEVICE | Site: KNEE | Status: FUNCTIONAL

## 2017-12-14 RX ORDER — ACETAMINOPHEN 10 MG/ML
1000 INJECTION, SOLUTION INTRAVENOUS ONCE
Status: COMPLETED | OUTPATIENT
Start: 2017-12-14 | End: 2017-12-14

## 2017-12-14 RX ORDER — OXYCODONE HYDROCHLORIDE 5 MG/1
5 TABLET ORAL ONCE
Status: COMPLETED | OUTPATIENT
Start: 2017-12-14 | End: 2017-12-14

## 2017-12-14 RX ORDER — SODIUM CHLORIDE, SODIUM LACTATE, POTASSIUM CHLORIDE, CALCIUM CHLORIDE 600; 310; 30; 20 MG/100ML; MG/100ML; MG/100ML; MG/100ML
INJECTION, SOLUTION INTRAVENOUS CONTINUOUS PRN
Status: DISCONTINUED | OUTPATIENT
Start: 2017-12-14 | End: 2017-12-14

## 2017-12-14 RX ORDER — CHLORHEXIDINE GLUCONATE ORAL RINSE 1.2 MG/ML
10 SOLUTION DENTAL
Status: DISCONTINUED | OUTPATIENT
Start: 2017-12-14 | End: 2017-12-14 | Stop reason: HOSPADM

## 2017-12-14 RX ORDER — PROPOFOL 10 MG/ML
VIAL (ML) INTRAVENOUS
Status: DISCONTINUED | OUTPATIENT
Start: 2017-12-14 | End: 2017-12-14

## 2017-12-14 RX ORDER — MORPHINE SULFATE 15 MG/1
15 TABLET, FILM COATED, EXTENDED RELEASE ORAL EVERY 8 HOURS PRN
Qty: 20 TABLET | Refills: 0 | Status: SHIPPED | OUTPATIENT
Start: 2017-12-14 | End: 2018-02-16

## 2017-12-14 RX ORDER — FENTANYL CITRATE 50 UG/ML
INJECTION, SOLUTION INTRAMUSCULAR; INTRAVENOUS
Status: DISCONTINUED | OUTPATIENT
Start: 2017-12-14 | End: 2017-12-14

## 2017-12-14 RX ORDER — MIDAZOLAM HYDROCHLORIDE 1 MG/ML
INJECTION, SOLUTION INTRAMUSCULAR; INTRAVENOUS
Status: DISCONTINUED | OUTPATIENT
Start: 2017-12-14 | End: 2017-12-14

## 2017-12-14 RX ORDER — OXYCODONE HYDROCHLORIDE 5 MG/1
5 TABLET ORAL EVERY 4 HOURS PRN
Qty: 40 TABLET | Refills: 0 | Status: SHIPPED | OUTPATIENT
Start: 2017-12-14 | End: 2018-02-16

## 2017-12-14 RX ORDER — EPINEPHRINE 1 MG/ML
INJECTION INTRAMUSCULAR; INTRAVENOUS; SUBCUTANEOUS
Status: DISCONTINUED | OUTPATIENT
Start: 2017-12-14 | End: 2017-12-14 | Stop reason: HOSPADM

## 2017-12-14 RX ORDER — LIDOCAINE HYDROCHLORIDE 10 MG/ML
INJECTION INFILTRATION; PERINEURAL
Status: DISCONTINUED | OUTPATIENT
Start: 2017-12-14 | End: 2017-12-14

## 2017-12-14 RX ORDER — SODIUM CHLORIDE, SODIUM LACTATE, POTASSIUM CHLORIDE, CALCIUM CHLORIDE 600; 310; 30; 20 MG/100ML; MG/100ML; MG/100ML; MG/100ML
INJECTION, SOLUTION INTRAVENOUS CONTINUOUS
Status: DISCONTINUED | OUTPATIENT
Start: 2017-12-14 | End: 2017-12-14 | Stop reason: HOSPADM

## 2017-12-14 RX ORDER — PROMETHAZINE HYDROCHLORIDE 25 MG/1
25 TABLET ORAL EVERY 6 HOURS PRN
Qty: 30 TABLET | Refills: 0 | Status: SHIPPED | OUTPATIENT
Start: 2017-12-14 | End: 2018-02-16

## 2017-12-14 RX ORDER — CEFAZOLIN SODIUM 2 G/50ML
2 SOLUTION INTRAVENOUS
Status: COMPLETED | OUTPATIENT
Start: 2017-12-14 | End: 2017-12-14

## 2017-12-14 RX ORDER — FENTANYL CITRATE 50 UG/ML
25 INJECTION, SOLUTION INTRAMUSCULAR; INTRAVENOUS EVERY 5 MIN PRN
Status: DISCONTINUED | OUTPATIENT
Start: 2017-12-14 | End: 2017-12-14 | Stop reason: HOSPADM

## 2017-12-14 RX ORDER — LIDOCAINE HYDROCHLORIDE 10 MG/ML
1 INJECTION, SOLUTION EPIDURAL; INFILTRATION; INTRACAUDAL; PERINEURAL ONCE
Status: DISCONTINUED | OUTPATIENT
Start: 2017-12-14 | End: 2017-12-14 | Stop reason: HOSPADM

## 2017-12-14 RX ORDER — ONDANSETRON 2 MG/ML
4 INJECTION INTRAMUSCULAR; INTRAVENOUS DAILY PRN
Status: DISCONTINUED | OUTPATIENT
Start: 2017-12-14 | End: 2017-12-14 | Stop reason: HOSPADM

## 2017-12-14 RX ORDER — HYDROMORPHONE HYDROCHLORIDE 1 MG/ML
0.2 INJECTION, SOLUTION INTRAMUSCULAR; INTRAVENOUS; SUBCUTANEOUS EVERY 5 MIN PRN
Status: DISCONTINUED | OUTPATIENT
Start: 2017-12-14 | End: 2017-12-14 | Stop reason: HOSPADM

## 2017-12-14 RX ORDER — ROCURONIUM BROMIDE 10 MG/ML
INJECTION, SOLUTION INTRAVENOUS
Status: DISCONTINUED | OUTPATIENT
Start: 2017-12-14 | End: 2017-12-14

## 2017-12-14 RX ORDER — LIDOCAINE HYDROCHLORIDE AND EPINEPHRINE 10; 10 MG/ML; UG/ML
INJECTION, SOLUTION INFILTRATION; PERINEURAL
Status: DISCONTINUED | OUTPATIENT
Start: 2017-12-14 | End: 2017-12-14 | Stop reason: HOSPADM

## 2017-12-14 RX ORDER — SODIUM CHLORIDE 0.9 % (FLUSH) 0.9 %
3 SYRINGE (ML) INJECTION EVERY 8 HOURS
Status: DISCONTINUED | OUTPATIENT
Start: 2017-12-14 | End: 2017-12-14 | Stop reason: HOSPADM

## 2017-12-14 RX ORDER — BUPIVACAINE HYDROCHLORIDE AND EPINEPHRINE 2.5; 5 MG/ML; UG/ML
INJECTION, SOLUTION EPIDURAL; INFILTRATION; INTRACAUDAL; PERINEURAL
Status: DISCONTINUED | OUTPATIENT
Start: 2017-12-14 | End: 2017-12-14 | Stop reason: HOSPADM

## 2017-12-14 RX ORDER — ONDANSETRON 2 MG/ML
INJECTION INTRAMUSCULAR; INTRAVENOUS
Status: DISCONTINUED | OUTPATIENT
Start: 2017-12-14 | End: 2017-12-14

## 2017-12-14 RX ORDER — SODIUM CHLORIDE 9 MG/ML
INJECTION, SOLUTION INTRAVENOUS CONTINUOUS
Status: DISCONTINUED | OUTPATIENT
Start: 2017-12-15 | End: 2017-12-14 | Stop reason: HOSPADM

## 2017-12-14 RX ORDER — SUCCINYLCHOLINE CHLORIDE 20 MG/ML
INJECTION INTRAMUSCULAR; INTRAVENOUS
Status: DISCONTINUED | OUTPATIENT
Start: 2017-12-14 | End: 2017-12-14

## 2017-12-14 RX ORDER — SODIUM CHLORIDE 0.9 % (FLUSH) 0.9 %
3 SYRINGE (ML) INJECTION
Status: DISCONTINUED | OUTPATIENT
Start: 2017-12-14 | End: 2017-12-14 | Stop reason: HOSPADM

## 2017-12-14 RX ADMIN — ACETAMINOPHEN 1000 MG: 10 INJECTION, SOLUTION INTRAVENOUS at 10:12

## 2017-12-14 RX ADMIN — FENTANYL CITRATE 50 MCG: 50 INJECTION, SOLUTION INTRAMUSCULAR; INTRAVENOUS at 08:12

## 2017-12-14 RX ADMIN — FENTANYL CITRATE 25 MCG: 50 INJECTION, SOLUTION INTRAMUSCULAR; INTRAVENOUS at 08:12

## 2017-12-14 RX ADMIN — MIDAZOLAM HYDROCHLORIDE 2 MG: 1 INJECTION, SOLUTION INTRAMUSCULAR; INTRAVENOUS at 06:12

## 2017-12-14 RX ADMIN — ONDANSETRON 4 MG: 2 INJECTION, SOLUTION INTRAMUSCULAR; INTRAVENOUS at 08:12

## 2017-12-14 RX ADMIN — CEFAZOLIN SODIUM 2 G: 2 SOLUTION INTRAVENOUS at 07:12

## 2017-12-14 RX ADMIN — FENTANYL CITRATE 25 MCG: 50 INJECTION INTRAMUSCULAR; INTRAVENOUS at 10:12

## 2017-12-14 RX ADMIN — PROPOFOL 30 MG: 10 INJECTION, EMULSION INTRAVENOUS at 08:12

## 2017-12-14 RX ADMIN — EPINEPHRINE 1 MG: 1 INJECTION INTRAMUSCULAR; INTRAVENOUS; SUBCUTANEOUS at 07:12

## 2017-12-14 RX ADMIN — PROPOFOL 150 MG: 10 INJECTION, EMULSION INTRAVENOUS at 07:12

## 2017-12-14 RX ADMIN — SODIUM CHLORIDE, SODIUM LACTATE, POTASSIUM CHLORIDE, AND CALCIUM CHLORIDE: 600; 310; 30; 20 INJECTION, SOLUTION INTRAVENOUS at 07:12

## 2017-12-14 RX ADMIN — FENTANYL CITRATE 100 MCG: 50 INJECTION, SOLUTION INTRAMUSCULAR; INTRAVENOUS at 07:12

## 2017-12-14 RX ADMIN — ROCURONIUM BROMIDE 5 MG: 10 INJECTION, SOLUTION INTRAVENOUS at 07:12

## 2017-12-14 RX ADMIN — SODIUM CHLORIDE, SODIUM LACTATE, POTASSIUM CHLORIDE, AND CALCIUM CHLORIDE: 600; 310; 30; 20 INJECTION, SOLUTION INTRAVENOUS at 06:12

## 2017-12-14 RX ADMIN — FENTANYL CITRATE 25 MCG: 50 INJECTION, SOLUTION INTRAMUSCULAR; INTRAVENOUS at 09:12

## 2017-12-14 RX ADMIN — BUPIVACAINE HYDROCHLORIDE AND EPINEPHRINE 40 ML: 2.5; 5 INJECTION, SOLUTION EPIDURAL; INFILTRATION; INTRACAUDAL; PERINEURAL at 07:12

## 2017-12-14 RX ADMIN — LIDOCAINE HYDROCHLORIDE AND EPINEPHRINE 10 ML: 10; 10 INJECTION, SOLUTION INFILTRATION; PERINEURAL at 07:12

## 2017-12-14 RX ADMIN — SUCCINYLCHOLINE CHLORIDE 100 MG: 20 INJECTION, SOLUTION INTRAMUSCULAR; INTRAVENOUS at 07:12

## 2017-12-14 RX ADMIN — OXYCODONE HYDROCHLORIDE 5 MG: 5 TABLET ORAL at 10:12

## 2017-12-14 RX ADMIN — LIDOCAINE HYDROCHLORIDE 60 MG: 10 INJECTION, SOLUTION INFILTRATION; PERINEURAL at 07:12

## 2017-12-14 NOTE — OP NOTE
DATE OF PROCEDURE:  04/13/2017     PREOPERATIVE DIAGNOSIS:  Rupture of left anterior cruciate ligament.  Torn medial meniscus posterior horn     POSTOPERATIVE DIAGNOSIS:* Rupture of anterior cruciate ligament of left knee, initial encounter [S83.512A] torn medial meniscus posterior horn left knee, hypertrophic inflamed synovial tissue, chondromalacia grade 2/moderate arthritis of medial femoral condyle and patella     OPERATION PERFORMED:  Arthroscopically-assisted anterior cruciate ligament   reconstruction, left knee with GraftLink allograft and all inside technique utilizing Arthrex tight rope attachable button system implants.   SURGEON:  Kenan Daly M.D.     ASSISTANT:  Brit Green PA-C     ANESTHESIA:  General.     DRAINS:  None.     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     ESTIMATED BLOOD LOSS:  10 mL.     COMPLICATIONS:  None.     INDICATIONS:  This 52-year-old female presented with an ACL deficient left knee.    H  Meka was noted to have a complete tear of her anterior cruciate ligament on MRI scanning as well as a tear of the posterior horn medial meniscus,   and was indicated for arthroscopically-assisted anterior cruciate ligament   reconstruction and medial meniscectomy to decrease her pain, diminish her morbidity and improve her ultimate functional ability.     Brit Green's seasoned skilled hands were necessary as first assistant for   graft preparation, positioning of the knee, and handling of instruments and   instrumentation as well as wound closure and dressing application.     DESCRIPTION OF OPERATION:  The patient was placed in the supine position on the   operating table and satisfactory general anesthesia was administered by   Anesthesia.   A tourniquet was placed high on the patient's left upper thigh and   under sterile conditions, 50 mL of 0.25% Marcaine with epinephrine was instilled   into the knee.  An inferomedial and inferolateral portal was anesthetized with   1% Xylocaine with epinephrine.  The patient's leg was then exsanguinated with an   Esmarch bandage and the tourniquet inflated to 300 mmHg pressure.  The   patient's left leg was then positioned in an arthroscopic leg negron and the   left knee and leg were then prepped and draped in usual sterile fashion.  A   30-degree arthroscope was then inserted through an inferolateral portal and a   systematic inspection of the knee was begun.  Hypertrophic inflamed synovium was   noted in the anterior aspects of the medial and lateral compartments and   intracondylar notch area.  A complete rupture of the anterior cruciate ligament   was noted with some scarification of the residual ligament to the posterior   cruciate ligament.  Through inframedial portal, the torn anterior cruciate   ligament was debrided.  The medial and lateral menisci were inspected.  A complex flap tear of the posterior horn medial meniscus near the root was noted.  The lateral meniscus was intact.  A partial medial meniscectomy was performed with basket forceps and intra-articular shaver and ablator social to remaining peripheral meniscus was smooth and was unable to be displaced into the joint.   The articular surface of the medial femoral condyle was noted to have grade 2 chondromalacia/early moderate arthritis.  The patella was noted to have early grade 2 chondromalacia at the inferior pole. The lateral, femoral and   tibial condyles were essentially within normal limits.   A GraftLink allograft was thawed on the back table.   the femoral side of the graft measured 10.5 mm in diameter and the tibial side measured 11 mm in diameter total.   The graft length   measured just over 6 cm  as prepared.  Utilizing the ArthM[employer]iquity    flip cutter measuring 10.5 mm in diameter and the Arthrex femoral tunnel jig, a   femoral socket 30 mm deep was made with the FlipCutter.  This left 7 mm of   intact bone to tie a TightRope button over.  A passing suture was placed through   the femoral tunnel, which was positioned at the anatomic footprint of the   anterior cruciate ligament.  Utilizing a plastic red stick, this was delivered   through the inferolateral portal. A tibial socket was then made with the   FlipCutter at the anatomic footprint of the ACL at the tibia utilizing an 11 mm   diameter FlipCutter.  A tibial tunnel measuring 30 mm in length with an 7mm   cortical outer wall was obtained.  A passing suture was placed through the   tibial tunnel and the passing sutures for both the femoral and tibial tunnels   were then passed through the inferomedial portal.  The prepared graft, which   was fitted with an Arthrex TightRope fixation device by Brit Green on the   back table was then delivered into the joint utilizing #2 FiberWire delivery   suture.  Care was taken to ensure the 10.5 mm diameter end of the graft was   delivered into the femoral tunnel to a depth of 2 cm.  The TightRope button was   pushed just external to the cortex and the graft was then pulled into the tunnel   with the TightRope sutures.  The tibial graft was then delivered into the   tibial tunnel in standard fashion with the TightRope and was cinched down to a   depth of 2 cm.  The graft was then placed through 15 cycles of range of motion   in flexion and extension and then permanently tensioned on the femoral and   tibial side.  The TightRope button was positioned externally on the tibial side   to allow tightening of the graft into the prepared tibial socket.  Excellent   secure fixation was achieved.  It should be noted that a notchplasty was   performed earlier in the procedure with a round 5.5 mm bur.  The graft was noted   to move freely in flexion and extension  with excellent stability.  It did not   impinge on either the tibial tunnel or posterior cruciate ligament.  The   TightRope sutures were then cut at the level of the bone.  The knee was   aspirated of any residual debris with the intraarticular shaver and 10 mL of   0.25% Marcaine with epinephrine was then instilled into the knee.  The   arthroscope with all instrumentation was then removed.  A Lachman test was   performed and found to be negative.  Subcutaneous tissues were closed utilizing   3-0 Vicryl simple subcutaneous sutures and the skin was closed with staples.    Sterile compression dressing followed by a hinged-knee brace with 0-90 degrees   range of motion was then applied.  The patient was then awakened and taken to   Recovery Room in satisfactory condition.  There were no complications.  Blood   loss was 10 mL and the patient tolerated the procedure well.

## 2017-12-14 NOTE — TRANSFER OF CARE
"Anesthesia Transfer of Care Note    Patient: Taryn Serrano    Procedure(s) Performed: Procedure(s) (LRB):  RECONSTRUCTION-LIGAMENT ANTERIOR CRUCIATE-ARTHROSCOPIC USING ALLOGRAFT AND MEDIAL MENISCECTOMY WITH DEBRIDEMENT OF BAKER'S CYST. LEFT (Left)    Patient location: PACU    Anesthesia Type: general    Transport from OR: Transported from OR on room air with adequate spontaneous ventilation    Post pain: adequate analgesia    Post assessment: no apparent anesthetic complications and tolerated procedure well    Post vital signs: stable    Level of consciousness: awake    Nausea/Vomiting: no nausea/vomiting    Complications: none    Transfer of care protocol was followed      Last vitals:   Visit Vitals  /69 (BP Location: Left arm, Patient Position: Sitting)   Pulse 80   Temp 36.8 °C (98.2 °F) (Skin)   Resp 18   Ht 5' 4" (1.626 m)   Wt 102.9 kg (226 lb 13.7 oz)   LMP 01/07/2015   SpO2 100%   Breastfeeding? No   BMI 38.94 kg/m²     "

## 2017-12-14 NOTE — DISCHARGE INSTRUCTIONS
General Information:  1.  Do not drink alcoholic beverages including beer for 24 hours or as long as you are on pain medication..  2.  Do not drive a motor vehicle, operate machinery or power tools, or signs legal papers for 24 hours or as long as you are on pain medication.   3.  You may experience light-headedness, dizziness, and sleepiness following surgery. Please do not stay alone. A responsible adult should be with you for this 24 hour period.  4.  Go home and rest.  5. Progress slowly to a normal diet unless instructed.  Otherwise, begin with liquids such as soft drinks, then soup and crackers working up to solid foods. Drink plenty of nonalcoholic fluids.  6.  Certain anesthetics and pain medications produce nausea and vomiting in certain       individuals. If nausea becomes a problem at home, call you doctor.  7. A nurse will be calling you sometime after surgery. Do not be alarmed. This is our way of finding out how you are doing.  8. Several times every hour while you are awake, take 2-3 deep breaths and cough. If you had stomach surgery hold a pillow or rolled towel firmly against your stomach before you cough. This will help with any pain the cough might cause.  9. Several times every hour while you are awake, pump and flex your feet 5-6 times and do foot circles. This will help prevent blood clots.  10.Call your doctor for severe pain, bleeding, fever, or signs or symptoms of infection (pain, swelling, redness, foul odor, drainage).  11.You can contact your doctor anytime by [phone] for the [facility] (at [facility]) or [phone] for the [facility] on [facility].   [email or web address] is another way to contact your doctor if you are an active participant online with [facility].

## 2017-12-14 NOTE — ANESTHESIA POSTPROCEDURE EVALUATION
"Anesthesia Post Evaluation    Patient: Taryn Serrano    Procedure(s) Performed: Procedure(s) (LRB):  RECONSTRUCTION-LIGAMENT ANTERIOR CRUCIATE-ARTHROSCOPIC USING ALLOGRAFT AND MEDIAL MENISCECTOMY WITH DEBRIDEMENT OF BAKER'S CYST. LEFT (Left)    [facility] Anesthesia Post Op Evaluation    Visit Vitals  /73   Pulse 66   Temp 36.5 °C (97.7 °F) (Temporal)   Resp 15   Ht 5' 4" (1.626 m)   Wt 102.9 kg (226 lb 13.7 oz)   LMP 01/07/2015   SpO2 95%   Breastfeeding? No   BMI 38.94 kg/m²       Pain/Denny Score: Pain Assessment Performed: Yes (12/14/2017 10:30 AM)  Presence of Pain: complains of pain/discomfort (12/14/2017 10:30 AM)  Pain Rating Prior to Med Admin: 4 (12/14/2017 10:02 AM)  Denny Score: 10 (12/14/2017 10:00 AM)      "

## 2017-12-14 NOTE — ANESTHESIA POSTPROCEDURE EVALUATION
"Anesthesia Post Evaluation    Patient: Taryn Serrano    Procedure(s) Performed: Procedure(s) (LRB):  RECONSTRUCTION-LIGAMENT ANTERIOR CRUCIATE-ARTHROSCOPIC USING ALLOGRAFT AND MEDIAL MENISCECTOMY WITH DEBRIDEMENT OF BAKER'S CYST. LEFT (Left)    Final Anesthesia Type: general  Patient location during evaluation: PACU  Patient participation: Yes- Able to Participate  Level of consciousness: awake and alert  Post-procedure vital signs: reviewed and stable  Pain management: adequate  Airway patency: patent  PONV status at discharge: No PONV  Anesthetic complications: no      Cardiovascular status: blood pressure returned to baseline  Respiratory status: unassisted  Hydration status: euvolemic  Follow-up not needed.        Visit Vitals  /73   Pulse 66   Temp 36.5 °C (97.7 °F) (Temporal)   Resp 15   Ht 5' 4" (1.626 m)   Wt 102.9 kg (226 lb 13.7 oz)   LMP 01/07/2015   SpO2 95%   Breastfeeding? No   BMI 38.94 kg/m²       Pain/Denny Score: Pain Assessment Performed: Yes (12/14/2017 10:30 AM)  Presence of Pain: complains of pain/discomfort (12/14/2017 10:30 AM)  Pain Rating Prior to Med Admin: 4 (12/14/2017 10:02 AM)  Denny Score: 10 (12/14/2017 10:00 AM)      "

## 2017-12-14 NOTE — INTERVAL H&P NOTE
The patient has been examined and the H&P has been reviewed:    I concur with the findings and no changes have occurred since H&P was written.    Anesthesia/Surgery risks, benefits and alternative options discussed and understood by patient/family.          Active Hospital Problems    Diagnosis  POA    Left ACL tear [S83.512A]  Yes      Resolved Hospital Problems    Diagnosis Date Resolved POA   No resolved problems to display.

## 2017-12-14 NOTE — TELEPHONE ENCOUNTER
Reason for Disposition   [1] SEVERE post-op pain (e.g., excruciating, pain scale 8-10) AND [2] not controlled with pain medications    Protocols used: ST POST-OP SYMPTOMS AND [id number]    Severe pain per Taryn. She is home following ACL repair (left ) today by Dr Daly.  She took roxicodone at noon, took morphine at 1500, and tylenol at 1600 and reports her pain is 10/10.  She wants to speak with Dr Daly. Since clinic is still open, hospital  transferred me to Aimee, Dr Daly's nurse, who was briefed on Taryn's concerns.  Aimee will notify Dr Daly as appropriate. Message to Dr Daly. Please contact caller directly with any additional care advice.

## 2017-12-14 NOTE — PLAN OF CARE
Gave home care instructions to patient and daughter, verbalized understanding.  All questions answered to the satisfaction of both.  To POV via WC, into POV without difficulty, distress or assist.

## 2017-12-14 NOTE — ANESTHESIA PREPROCEDURE EVALUATION
12/14/2017  Taryn Serrano is a 52 y.o., female.    Anesthesia Evaluation    I have reviewed the Patient Summary Reports.    I have reviewed the Nursing Notes.      Review of Systems  Anesthesia Hx:  No problems with previous Anesthesia Denies Hx of Anesthetic complications  History of prior surgery of interest to airway management or planning: Previous anesthesia: General Denies Family Hx of Anesthesia complications.   Denies Personal Hx of Anesthesia complications.   Social:  No Alcohol Use, Non-Smoker    Hematology/Oncology:  Hematology Normal   Oncology Normal     EENT/Dental:EENT/Dental Normal   Cardiovascular:  Cardiovascular Normal     Pulmonary:   Asthma Sleep Apnea    Renal/:  Renal/ Normal     Hepatic/GI:  Hepatic/GI Normal    Musculoskeletal:  Musculoskeletal Normal    Neurological:  Neurology Normal    Endocrine:   Hypothyroidism    Dermatological:  Skin Normal    Psych:   anxiety depression          Physical Exam  General:  Obesity    Airway/Jaw/Neck:  Airway Findings: Mouth Opening: Normal Tongue: Normal  General Airway Assessment: Adult  Mallampati: II  TM Distance: Normal, at least 6 cm      Dental:  Dental Findings: In tact   Chest/Lungs:  Chest/Lungs Findings: Clear to auscultation, Normal Respiratory Rate     Heart/Vascular:  Heart Findings: Rate: Normal  Rhythm: Regular Rhythm  Sounds: Normal        Mental Status:  Mental Status Findings:  Cooperative, Alert and Oriented         Anesthesia Plan  Type of Anesthesia, risks & benefits discussed:  Anesthesia Type:  general  Patient's Preference:   Intra-op Monitoring Plan: standard ASA monitors  Intra-op Monitoring Plan Comments:   Post Op Pain Control Plan:   Post Op Pain Control Plan Comments:   Induction:   IV  Beta Blocker:  Patient is not currently on a Beta-Blocker (No further documentation required).       Informed Consent:  Patient understands risks and agrees with Anesthesia plan.  Questions answered. Anesthesia consent signed with patient.  ASA Score: 2     Day of Surgery Review of History & Physical:            Ready For Surgery From Anesthesia Perspective.

## 2017-12-14 NOTE — BRIEF OP NOTE
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       Final Diagnosis: Post-Op Diagnosis Codes:     * Rupture of anterior cruciate ligament of left knee, initial encounter [S83.512A]    Disposition:  Home or Self Care    Follow Up/Patient Instructions:     Medications:  Reconciled Home Medications:   Discharge Medication List as of 12/14/2017 10:23 AM      START taking these medications    Details   morphine (MS CONTIN) 15 MG 12 hr tablet Take 1 tablet (15 mg total) by mouth every 8 (eight) hours as needed for Pain., Starting Thu 12/14/2017, Normal      oxyCODONE (ROXICODONE) 5 MG immediate release tablet Take 1 tablet (5 mg total) by mouth every 4 (four) hours as needed for Pain., Starting Thu 12/14/2017, Normal      promethazine (PHENERGAN) 25 MG tablet Take 1 tablet (25 mg total) by mouth every 6 (six) hours as needed for Nausea., Starting Thu 12/14/2017, Normal         CONTINUE these medications which have NOT CHANGED    Details   albuterol (VENTOLIN HFA) 90 mcg/actuation inhaler Inhale 1-2 puffs into the lungs every 6 (six) hours as needed for Wheezing., Starting Fri 7/14/2017, Normal      buPROPion (WELLBUTRIN XL) 150 MG TB24 tablet TAKE 1 TABLET (150 MG TOTAL) BY MOUTH ONCE DAILY., Normal      cholecalciferol, vitamin D3, 2,000 unit Cap Take 1 capsule (2,000 Units total) by mouth once daily., Starting 6/17/2014, Until Discontinued, Print      fluoxetine (PROZAC) 40 MG capsule TAKE 2 CAPSULES (80 MG TOTAL) BY MOUTH ONCE DAILY., Normal      fluticasone (FLONASE) 50 mcg/actuation nasal spray 2 sprays by Each Nare route once daily., Starting 7/8/2015, Until Discontinued, Normal      fluticasone-salmeterol 500-50 mcg/dose (ADVAIR DISKUS) 500-50 mcg/dose DsDv diskus inhaler Inhale 1 puff into the lungs 2 (two) times daily., Starting 3/2/2016, Until Discontinued, Normal      ibuprofen (ADVIL,MOTRIN) 200 MG tablet Take 200 mg by mouth as needed for Pain., Until Discontinued, Historical Med      levothyroxine (SYNTHROID) 112 MCG tablet TAKE 1 TABLET (112 MCG TOTAL) BY MOUTH ONCE DAILY., Starting Mon 6/5/2017, Until Tue 6/5/2018, Normal             Discharge Procedure Orders  WALKER FOR HOME USE   Order Specific  "Question Answer Comments   Type of Walker: Adult (5'4"-6'6")    With wheels? Yes    Height: 5' 4" (1.626 m)    Weight: 102.9 kg (226 lb 13.7 oz)    Length of need (1-99 months): 2    Please check all that apply: Patient is unable to safely ambulate without equipment.    Please check all that apply: Walker will be used for gait training.      Diet general     Ice to affected area     Call MD for:  temperature >100.4     Call MD for:  persistent nausea and vomiting     Call MD for:  severe uncontrolled pain     Call MD for:  redness, tenderness, or signs of infection (pain, swelling, redness, odor or green/yellow discharge around incision site)     Call MD for:  hives     Change dressing (specify)   Order Comments: Change dressing in 3-4 days, shower and apply new dressing using sterile gauze and an ACE wrap. Keep knee and incisions clean, dry and intact.  Keep postop hinged brace in place at all times.     Weight bearing restrictions (specify)   Order Comments: PWB- 40% on LLE with the use of a walker for 3 weeks.       Follow-up Information     Kenan Daly MD. Schedule an appointment as soon as possible for a visit in 2 weeks.    Specialty:  Orthopedic Surgery  Why:  For suture/staple removal, For wound re-check  Contact information:  [address] [place] [address] [address] Mariann LOPEZ [phone]                 "

## 2017-12-14 NOTE — PLAN OF CARE
Order received for a rolling walker.  Approved by Raul to obtain walker from depot closet.  Rolling walker delivered to patient by [employer]

## 2017-12-15 ENCOUNTER — OFFICE VISIT (OUTPATIENT)
Dept: ORTHOPEDICS | Facility: CLINIC | Age: 52
End: 2017-12-15
Payer: COMMERCIAL

## 2017-12-15 VITALS
DIASTOLIC BLOOD PRESSURE: 62 MMHG | TEMPERATURE: 98 F | HEART RATE: 80 BPM | RESPIRATION RATE: 12 BRPM | SYSTOLIC BLOOD PRESSURE: 95 MMHG

## 2017-12-15 DIAGNOSIS — S83.512A RUPTURE OF ANTERIOR CRUCIATE LIGAMENT OF LEFT KNEE, INITIAL ENCOUNTER: Primary | ICD-10-CM

## 2017-12-15 PROCEDURE — 99999 PR PBB SHADOW E&M-EST. PATIENT-LVL IV: CPT | Mod: PBBFAC,,, | Performed by: PHYSICIAN ASSISTANT

## 2017-12-15 PROCEDURE — 99024 POSTOP FOLLOW-UP VISIT: CPT | Mod: S$GLB,,, | Performed by: PHYSICIAN ASSISTANT

## 2017-12-19 NOTE — PROGRESS NOTES
Patient ID: Taryn Serrano is a 52 y.o. female.    Chief Complaint: Post-op Evaluation of the Left Knee      HPI: Taryn Serrano  is a 52 y.o. female who c/o Post-op Evaluation of the Left Knee   for duration of 1 day.  She underwent a left knee anterior cruciate ligament reconstruction by Dr. Daly on 12/14/17.  SHe has had some drainage from the incision sites that has come through the dressing.  Her daughter reinforced the dressings based on recommendations by Dr. Serrano (her father).  Despite this, she continues with drainage.  They were concerned, so they made an appointment to be seen.  Pain level VI out of 10 in severity.  She denies fevers since surgery.  Alleviating factors include elevating it as well as using the knee brace.  Aggravating factors include trying to bend the knee.  He has a postop hinged knee brace in place which is unlocked 0-90°.        Objective:                    Left Knee Exam     Comments:  Dressing overlying the left knee are saturated with blood.  7 appears to be relatively old because it is dried.  However, there is some bright red blood.  After taking the dressing down, the incisions remain intact.  No active bleeding and no active drainage from the incision sites today.  Incisions are clean with Betadine.  A new sterile dressing was applied to the left knee incisions.  Structures were to keep dressing in place and change it as instructed by Dr. Jimenez in his postoperative instructions.  Compartments are soft.  Capillary refill less than 2 seconds.  No pretibial edema.  No calf tenderness.  Sensation intact to light touch.  No erythema.  No purulence.  No sign or symptom of infection.          Assessment:       Encounter Diagnosis   Name Primary?    Rupture of anterior cruciate ligament of left knee, initial encounter Yes          Plan:       Taryn was seen today for post-op evaluation.    Diagnoses and all orders for this visit:    Rupture of anterior cruciate ligament  of left knee, initial encounter  -     Ambulatory Referral to Physical/Occupational Therapy    Ms. Centeno comes in today for evaluation of left knee drainage status post anterior cruciate ligament reconstruction.  I Reassured her and her daughter.  Everything looks good.  I have replaced the dressing and given instructions to adhere to Dr. Daly his initial postoperative instructions.  I've explained knees further to them.  They will keep their follow-up appointment with me in approximately 2 weeks.  If they have problems before then they will notify the office.  I have also given her an order for physical therapy to follow postop anterior cruciate ligament rehabilitation protocol.  Patient and daughter verbalized understanding and agree with the above plan.  Return in about 2 weeks (around 12/29/2017).          The patient understands, chooses and consents to this plan and accepts all   the risks which include but are not limited to the risks mentioned above.     Disclaimer: This note was prepared using a voice recognition system and is likely to have sound alike errors within the text.

## 2017-12-20 NOTE — PROGRESS NOTES
PHYSICAL THERAPY INITIAL OUTPATIENT EVALUATION    Referring Provider:  Yeny Rahman    Diagnosis:       ICD-10-CM ICD-9-CM    1. S/P ACL reconstruction Z98.890 V45.89      Orders:  Evaluate and Treat    Date of Initial Evaluation: 12/21/17    Visit # 1 of 12.    SUBJECTIVE:  Patient reports that approximately 1 year ago is when she initially tore her ACL. She had been performing prehab for several months until she was finally able to undergo surgery on 12/14/17. She underwent an allograft and has Wbing precautions at 40%. She reports her pain as not been too bad, but she is noticing some swelling.    Main complaint: Swelling, increased pain with certain activities    Pain: 3 /10, located L anterior knee, described as achy  OBJECTIVE:          AT EVAL       TODAY  Gait: Antalgic to L side, decreased weight shift, decreased heel to toe    Sensation: intact to light touch     Knee AROM:  Flexion      85      Extension    3 (into flexion)  Knee PROM  Flexion      85      Extension    0        Strength:  Quadriceps    4      Hamstrings    4     Gluteus Medius   3+      Gluteus Jaskaran   3     Hip Flexors    3+ (noted quad lag with SLR)            Function:  LEFS      55% disability on initial evaluation.    Tenderness to palpation:  Tenderness to patella    Other: Increased swelling to L side vs R side; not utilizing brace appropriately with hinged knee brace way lower    Special Test: none    ASSESSMENT:  The patient is a 52 y.o. year old female who presents to physical therapy with complaints of difficulty and pain with certain movements and ambulating s/p surgery.  Patient's impairments include decreased L LE strength, L quad muscle atrophy, impaired ROM, increased swelling, joint protection for rehab protocol, and difficulty in walking.  These impairments are limiting patient's ability to perform gait, ADLs, and wanted activities without increase in pain.  Patient's prognosis is good.  Patient will benefit  from skilled physical therapy intervention to improve strength, swelling, ROM, decrease pain, and improve ambulation in order to improve quality of life.    Co-morbidities which may impact the plan of care and potentially impede the patient's progress in therapy include: anxiety, depression, URBAN    Patients CLINICAL PRESENTATION is STABLE.     Short Term Goals:  1-4 weeks  1.I with HEP  2. Increase A/PROM from 0-90 degrees  3. Increase hip/knee strength 1/2 grade  4. Pt demo decreased swelling at mid patella  Long Term Goals: 5-12 weeks  1.Ambulate with normalized gait pattern  2. Hip/knee strength WNL  3. Knee ROM WNL  4. Decrease Pain to 0 with all activities    TREATMENT PROVIDED:  -Manual Therapy:  None provided today  -Therapeutic Exercise:  15 min  Quad sets  Knee EX stretch  LAQ  Knee curls  SLR  Hip AB  - Gait: 10 min  Focus on utilizing AD appropriately  -Modalities: none provided today  -Evaluation  -Education: 5 min: on condition and HEP    PLAN:  Patient will benefit from physical therapy (2-3) x/week for (8-12) weeks including manual therapy, therapeutic exercise, functional activities, modalities, and patient education.    Thank you for this referral.    These services are reasonable and necessary for the conditions set forth above while under my care.    Yudi Mercado, PT, DPT

## 2017-12-21 ENCOUNTER — CLINICAL SUPPORT (OUTPATIENT)
Dept: REHABILITATION | Facility: HOSPITAL | Age: 52
End: 2017-12-21
Payer: COMMERCIAL

## 2017-12-21 ENCOUNTER — PATIENT MESSAGE (OUTPATIENT)
Dept: ORTHOPEDICS | Facility: CLINIC | Age: 52
End: 2017-12-21

## 2017-12-21 DIAGNOSIS — Z98.890 S/P ACL RECONSTRUCTION: Primary | ICD-10-CM

## 2017-12-21 PROCEDURE — 97161 PT EVAL LOW COMPLEX 20 MIN: CPT

## 2017-12-21 PROCEDURE — 97116 GAIT TRAINING THERAPY: CPT

## 2017-12-21 PROCEDURE — 97110 THERAPEUTIC EXERCISES: CPT

## 2017-12-22 NOTE — PROGRESS NOTES
PHYSICAL THERAPY DAILY NOTE    Referring Provider:  Yeny Rahman    Diagnosis:       ICD-10-CM ICD-9-CM    1. S/P ACL reconstruction Z98.890 V45.89      Orders:  Evaluate and Treat    Date of Initial Evaluation: 12/21/17    Visit # 2 of 12.    BACKGROUND:  Patient reports that approximately 1 year ago is when she initially tore her ACL. She had been performing prehab for several months until she was finally able to undergo surgery on 12/14/17. She underwent an allograft and has Wbing precautions at 40%. She reports her pain as not been too bad, but she is noticing some swelling.    Main complaint: Swelling, increased pain with certain activities    Pain: 3 /10, located L anterior knee, described as achy    SUBJECTIVE: Patient reports she feels like she is getting a little better at gait training with FWW, but it is still difficult.    OBJECTIVE:          AT EVAL       TODAY  Gait: Antalgic to L side, decreased weight shift, decreased heel to toe    Sensation: intact to light touch     Knee AROM:  Flexion      85      Extension    3 (into flexion)  Knee PROM  Flexion      85      Extension    0        Strength:  Quadriceps    4      Hamstrings    4     Gluteus Medius   3+      Gluteus Jaskaran   3     Hip Flexors    3+ (noted quad lag with SLR)            Function:  LEFS      55% disability on initial evaluation.    Tenderness to palpation:  Tenderness to patella    Other: Increased swelling to L side vs R side; not utilizing brace appropriately with hinged knee brace way lower    ASSESSMENT:  Patient demonstrated good tolerance to there ex without any increase in pain.     TREATMENT PROVIDED:  -Manual Therapy:  None provided today  -Therapeutic Exercise:  25 min  Scifit x 5 min  Knee EX stretch  LAQ  Standing gastroc stretch  - Gait: 8 min  Focus on utilizing AD appropriately  Demonstrating how to use with stairs  -Modalities: 15 min NMES + SAQ and 10 min ice + TENS to L knee  -Education: 5 min: on condition  and HEP    PLAN:  Patient will benefit from physical therapy (2-3) x/week for (8-12) weeks including manual therapy, therapeutic exercise, functional activities, modalities, and patient education.    Thank you for this referral.    These services are reasonable and necessary for the conditions set forth above while under my care.    Yudi Mercado, PT, DPT

## 2017-12-26 ENCOUNTER — CLINICAL SUPPORT (OUTPATIENT)
Dept: REHABILITATION | Facility: HOSPITAL | Age: 52
End: 2017-12-26
Payer: COMMERCIAL

## 2017-12-26 DIAGNOSIS — Z98.890 S/P ACL RECONSTRUCTION: Primary | ICD-10-CM

## 2017-12-26 PROCEDURE — 97014 ELECTRIC STIMULATION THERAPY: CPT

## 2017-12-26 PROCEDURE — 97110 THERAPEUTIC EXERCISES: CPT

## 2017-12-27 VITALS
RESPIRATION RATE: 15 BRPM | HEIGHT: 64 IN | DIASTOLIC BLOOD PRESSURE: 73 MMHG | TEMPERATURE: 98 F | SYSTOLIC BLOOD PRESSURE: 119 MMHG | WEIGHT: 226.88 LBS | BODY MASS INDEX: 38.73 KG/M2 | HEART RATE: 66 BPM | OXYGEN SATURATION: 95 %

## 2017-12-27 NOTE — PROGRESS NOTES
PHYSICAL THERAPY DAILY NOTE    Referring Provider:  Yeny Rahman    Diagnosis:       ICD-10-CM ICD-9-CM    1. S/P ACL reconstruction Z98.890 V45.89      Orders:  Evaluate and Treat    Date of Initial Evaluation: 12/21/17    Visit # 3 of 12.    BACKGROUND:  Patient reports that approximately 1 year ago is when she initially tore her ACL. She had been performing prehab for several months until she was finally able to undergo surgery on 12/14/17. She underwent an allograft and has Wbing precautions at 40%. She reports her pain as not been too bad, but she is noticing some swelling.    Main complaint: Swelling, increased pain with certain activities    Pain: 3 /10, located L anterior knee, described as achy    SUBJECTIVE: Patient reports some soreness from getting her stitches out and she just started using the crutches today.    OBJECTIVE:          AT EVAL       TODAY  Gait: Antalgic to L side, decreased weight shift, decreased heel to toe    Sensation: intact to light touch     Knee AROM:  Flexion      85      Extension    3 (into flexion)  Knee PROM  Flexion      85      Extension    0        Strength:  Quadriceps    4      Hamstrings    4     Gluteus Medius   3+      Gluteus Jaskaran   3     Hip Flexors    3+ (noted quad lag with SLR)            Function:  LEFS      55% disability on initial evaluation.    Tenderness to palpation:  Tenderness to patella    Other: Increased swelling to L side vs R side; not utilizing brace appropriately with hinged knee brace way lower    ASSESSMENT: Patient demonstrated good tolerance to standing there ex without any increase in pain. Improvement in muscle recruitment while using NMES and performing quad set/SAQ.    TREATMENT PROVIDED:  -Manual Therapy:  5 min  Patellar mobilizations  -Therapeutic Exercise:  30 min  Scifit x 8 min  Knee EX stretch  LAQ  Standing gastroc stretch  Standing hip FL/AB/EX  - Gait: 8 min  Focus on utilizing crutches appropriately  Demonstrating  how to use with stairs  -Modalities: 10 min NMES + SAQ/quad set and 10 min ice + TENS to L knee  -Education: 5 min: on condition and HEP    PLAN:  Patient will benefit from physical therapy (2-3) x/week for (8-12) weeks including manual therapy, therapeutic exercise, functional activities, modalities, and patient education.    Thank you for this referral.    These services are reasonable and necessary for the conditions set forth above while under my care.    Yudi Mercado, PT, DPT

## 2017-12-28 ENCOUNTER — OFFICE VISIT (OUTPATIENT)
Dept: ORTHOPEDICS | Facility: CLINIC | Age: 52
End: 2017-12-28
Payer: COMMERCIAL

## 2017-12-28 ENCOUNTER — CLINICAL SUPPORT (OUTPATIENT)
Dept: REHABILITATION | Facility: HOSPITAL | Age: 52
End: 2017-12-28
Payer: COMMERCIAL

## 2017-12-28 VITALS
BODY MASS INDEX: 38.58 KG/M2 | DIASTOLIC BLOOD PRESSURE: 74 MMHG | WEIGHT: 226 LBS | HEART RATE: 74 BPM | HEIGHT: 64 IN | TEMPERATURE: 98 F | RESPIRATION RATE: 12 BRPM | SYSTOLIC BLOOD PRESSURE: 119 MMHG

## 2017-12-28 DIAGNOSIS — S83.512A RUPTURE OF ANTERIOR CRUCIATE LIGAMENT OF LEFT KNEE, INITIAL ENCOUNTER: Primary | ICD-10-CM

## 2017-12-28 DIAGNOSIS — Z09 POSTOP CHECK: ICD-10-CM

## 2017-12-28 DIAGNOSIS — S83.242A TEAR OF MEDIAL MENISCUS OF LEFT KNEE, UNSPECIFIED TEAR TYPE, UNSPECIFIED WHETHER OLD OR CURRENT TEAR, INITIAL ENCOUNTER: ICD-10-CM

## 2017-12-28 DIAGNOSIS — Z98.890 S/P ACL RECONSTRUCTION: Primary | ICD-10-CM

## 2017-12-28 PROCEDURE — 97110 THERAPEUTIC EXERCISES: CPT

## 2017-12-28 PROCEDURE — 99024 POSTOP FOLLOW-UP VISIT: CPT | Mod: S$GLB,,, | Performed by: PHYSICIAN ASSISTANT

## 2017-12-28 PROCEDURE — 99999 PR PBB SHADOW E&M-EST. PATIENT-LVL III: CPT | Mod: PBBFAC,,, | Performed by: PHYSICIAN ASSISTANT

## 2017-12-28 PROCEDURE — 97014 ELECTRIC STIMULATION THERAPY: CPT

## 2017-12-28 PROCEDURE — 97116 GAIT TRAINING THERAPY: CPT

## 2017-12-28 NOTE — PROGRESS NOTES
Patient ID: Taryn Serrano is a 52 y.o. female.    Chief Complaint: Post-op Evaluation of the Left Knee      HPI: Taryn Serrano  is a 52 y.o. female who c/o Post-op Evaluation of the Left Knee   for duration of 2 weeks.  She underwent a left knee anterior cruciate ligament reconstruction by Dr. Daly on 12/14/17.  She is feeling better now.  She has started with physical therapy and states she is improving.  She tells me this morning when she woke up the leg is feeling a little bit stiff, but otherwise she is doing well.  Pain level is 3 out of 10 in severity.  She is off of all pain medications.  After seeing me last week, she tells me she did not have any other problems with drainage from the left knee scope incisions.        Objective:                    Left Knee Exam     Comments:  The extensor mechanism intact she has near full extension.  She is able to activate her quad.  She has flexion past 90° today.  Her incisions are clean, dry, and intact.  There is no erythema.  No purulence.  No sign or symptom of infection.  Compartments are soft.  Capillary refill less than 2 seconds.  No pedal or pretibial edema.      Assessment:       Encounter Diagnoses   Name Primary?    Rupture of anterior cruciate ligament of left knee, initial encounter Yes    Tear of medial meniscus of left knee, unspecified tear type, unspecified whether old or current tear, initial encounter     Postop check           Plan:       Taryn was seen today for post-op evaluation.    Diagnoses and all orders for this visit:    Rupture of anterior cruciate ligament of left knee, initial encounter    Tear of medial meniscus of left knee, unspecified tear type, unspecified whether old or current tear, initial encounter    Postop check        Ms. Centeno comes in today to follow-up on her anterior cruciate ligament reconstruction done 2 weeks ago.  She will continue using the postop hinged knee brace and an unlocked position.  She can  gradually increase weightbearing as tolerated.  She is requesting a pair of crutches so we will get her fitted for that.  Additionally, she will continue with physical therapy.  I will see her back in the office in 2 weeks.  All incisions were cleaned with Betadine and alcohol.  Staples were removed.  Suture strips were applied across the incisions.  She has been instructed that she can clean the incisions with clean running water and antibacterial soap in the shower.  However, I do not want her soaking the incisions in standing water at this time.  As his understanding and agrees with the above plan.  She will notify the office of any problems prior to follow-up.  Return in about 1 month (around 1/28/2018).          The patient understands, chooses and consents to this plan and accepts all   the risks which include but are not limited to the risks mentioned above.     Disclaimer: This note was prepared using a voice recognition system and is likely to have sound alike errors within the text.

## 2017-12-29 NOTE — PROGRESS NOTES
PHYSICAL THERAPY DAILY NOTE    Referring Provider:  Yeny Rahman    Diagnosis:       ICD-10-CM ICD-9-CM    1. S/P ACL reconstruction Z98.890 V45.89      Orders:  Evaluate and Treat    Date of Initial Evaluation: 12/21/17    Visit # 4 of 12.    BACKGROUND:  Patient reports that approximately 1 year ago is when she initially tore her ACL. She had been performing prehab for several months until she was finally able to undergo surgery on 12/14/17. She underwent an allograft and has Wbing precautions at 40%. She reports her pain as not been too bad, but she is noticing some swelling.    Main complaint: Swelling, increased pain with certain activities    Pain: 3 /10, located L anterior knee, described as achy    SUBJECTIVE: Patient reports increased stiffness and difficulty with ambulating due to brace not fitting properly. Felt more discomfort with nustep today.    OBJECTIVE:          AT EVAL       TODAY  Gait: Antalgic to L side, decreased weight shift, decreased heel to toe    Sensation: intact to light touch     Knee AROM:  Flexion      85      Extension    3 (into flexion)  Knee PROM  Flexion      85      Extension    0        Strength:  Quadriceps    4      Hamstrings    4     Gluteus Medius   3+      Gluteus Jaskaran   3     Hip Flexors    3+ (noted quad lag with SLR)            Function:  LEFS      55% disability on initial evaluation.    Tenderness to palpation:  Tenderness to patella    Other: Increased swelling to L side vs R side; not utilizing brace appropriately with hinged knee brace way lower    ASSESSMENT: Patient demonstrated good tolerance to new there ex without any increase in pain. Demonstrated patient how to utilize NMES correctly and educated to bring back in next session to insure she is using correctly.    TREATMENT PROVIDED:  -Manual Therapy:  8 min  Patellar mobilizations  STM t o L quad  -Therapeutic Exercise:  35 min  Scifit x 8 min  Heels slides  Heel raises on foam  Standing  balance  Knee EX stretch  Standing gastroc stretch  Standing hip FL/AB/EX  - Gait: 5 min  Focus on utilizing crutches appropriately with new Wbing status  -Modalities: 10 min NMES + SAQ/quad set and 10 min ice + TENS to L knee  -Education: 5 min: on condition and HEP    PLAN:  Patient will benefit from physical therapy (2-3) x/week for (8-12) weeks including manual therapy, therapeutic exercise, functional activities, modalities, and patient education.    Thank you for this referral.    These services are reasonable and necessary for the conditions set forth above while under my care.    Yudi Mercado, PT, DPT

## 2018-01-02 ENCOUNTER — CLINICAL SUPPORT (OUTPATIENT)
Dept: REHABILITATION | Facility: HOSPITAL | Age: 53
End: 2018-01-02
Payer: COMMERCIAL

## 2018-01-02 DIAGNOSIS — Z98.890 S/P ACL RECONSTRUCTION: Primary | ICD-10-CM

## 2018-01-02 PROCEDURE — 97014 ELECTRIC STIMULATION THERAPY: CPT

## 2018-01-02 PROCEDURE — 97110 THERAPEUTIC EXERCISES: CPT

## 2018-01-02 PROCEDURE — 97140 MANUAL THERAPY 1/> REGIONS: CPT

## 2018-01-04 NOTE — PROGRESS NOTES
PHYSICAL THERAPY DAILY NOTE    Referring Provider:  Yeny Rahman    Diagnosis:       ICD-10-CM ICD-9-CM    1. S/P ACL reconstruction Z98.890 V45.89      Orders:  Evaluate and Treat    Date of Initial Evaluation: 12/21/17    Visit # 5 of 12.    BACKGROUND:  Patient reports that approximately 1 year ago is when she initially tore her ACL. She had been performing prehab for several months until she was finally able to undergo surgery on 12/14/17. She underwent an allograft and has Wbing precautions at 40%. She reports her pain as not been too bad, but she is noticing some swelling.    Main complaint: Swelling, increased pain with certain activities    Pain: 3 /10, located L anterior knee, described as achy    SUBJECTIVE: Patient reports her knee has been feeling fine, but a little stiff.    OBJECTIVE:          AT EVAL       TODAY  Gait: Antalgic to L side, decreased weight shift, decreased heel to toe    Sensation: intact to light touch     Knee AROM:  Flexion      85      Extension    3 (into flexion)  Knee PROM  Flexion      85      Extension    0        Strength:  Quadriceps    4      Hamstrings    4     Gluteus Medius   3+      Gluteus Jaskaran   3     Hip Flexors    3+ (noted quad lag with SLR)            Function:  LEFS      55% disability on initial evaluation.    Tenderness to palpation:  Tenderness to patella    Other: Increased swelling to L side vs R side; not utilizing brace appropriately with hinged knee brace way lower    ASSESSMENT: Patient demonstrated good tolerance to standing there ex without any increase in pain and improvement in patellar mobility since last session.    TREATMENT PROVIDED:  -Manual Therapy:  3 min  Patellar mobilizations  -Therapeutic Exercise:  35 min  Scifit x 8 min  Heels slides  Heel raises on foam  Standing balance  Knee EX stretch  Standing gastroc stretch  Standing hip FL/AB/EX  - Gait: 5 min  Focus on utilizing crutches appropriately and improving Wbing  status  -Modalities: 10 min NMES + SAQ/quad set and 10 min ice + TENS to L knee  -Education: 5 min: on condition and HEP    PLAN:  Patient will benefit from physical therapy (2-3) x/week for (8-12) weeks including manual therapy, therapeutic exercise, functional activities, modalities, and patient education.    Thank you for this referral.    These services are reasonable and necessary for the conditions set forth above while under my care.    Casey-Renata Mercado, PT, DPT

## 2018-01-05 ENCOUNTER — CLINICAL SUPPORT (OUTPATIENT)
Dept: REHABILITATION | Facility: HOSPITAL | Age: 53
End: 2018-01-05
Payer: COMMERCIAL

## 2018-01-05 DIAGNOSIS — Z98.890 S/P ACL RECONSTRUCTION: Primary | ICD-10-CM

## 2018-01-05 PROCEDURE — 97014 ELECTRIC STIMULATION THERAPY: CPT

## 2018-01-05 PROCEDURE — 97110 THERAPEUTIC EXERCISES: CPT

## 2018-01-10 ENCOUNTER — CLINICAL SUPPORT (OUTPATIENT)
Dept: REHABILITATION | Facility: HOSPITAL | Age: 53
End: 2018-01-10
Payer: COMMERCIAL

## 2018-01-10 ENCOUNTER — PATIENT MESSAGE (OUTPATIENT)
Dept: ORTHOPEDICS | Facility: CLINIC | Age: 53
End: 2018-01-10

## 2018-01-10 PROCEDURE — 97014 ELECTRIC STIMULATION THERAPY: CPT

## 2018-01-10 PROCEDURE — 97110 THERAPEUTIC EXERCISES: CPT

## 2018-01-10 PROCEDURE — 97116 GAIT TRAINING THERAPY: CPT

## 2018-01-10 NOTE — PROGRESS NOTES
PHYSICAL THERAPY DAILY NOTE    Referring Provider:  Yeny Rahman    Diagnosis:     No diagnosis found.  Orders:  Evaluate and Treat    Date of Initial Evaluation: 12/21/17    Visit # 6 of 12.    BACKGROUND:  Patient reports that approximately 1 year ago is when she initially tore her ACL. She had been performing prehab for several months until she was finally able to undergo surgery on 12/14/17. She underwent an allograft and has Wbing precautions at 40%. She reports her pain as not been too bad, but she is noticing some swelling.    Main complaint: Swelling, increased pain with certain activities    Pain: 3 /10, located L anterior knee, described as achy    SUBJECTIVE:     OBJECTIVE:          AT EVAL       TODAY  Gait: Antalgic to L side, decreased weight shift, decreased heel to toe    Sensation: intact to light touch     Knee AROM:  Flexion      85      Extension    3 (into flexion)  Knee PROM  Flexion      85      Extension    0        Strength:  Quadriceps    4      Hamstrings    4     Gluteus Medius   3+      Gluteus Jaskaran   3     Hip Flexors    3+ (noted quad lag with SLR)            Function:  LEFS      55% disability on initial evaluation.    Tenderness to palpation:  Tenderness to patella    Other: Increased swelling to L side vs R side; not utilizing brace appropriately with hinged knee brace way lower    ASSESSMENT: Patient demonstrated good tolerance to forward and retro walking without any increased pain. Also noted improvement in terminal standing extension by end of treatment.    TREATMENT PROVIDED:  -Manual Therapy:  3 min  Patellar mobilizations  -Therapeutic Exercise:  35 min  Scifit x 8 min  Heels slides  Heel raises on foam  Standing balance on foam eyes closed  Knee EX stretch  Standing gastroc stretch  Standing hip FL/AB/EX on foam  - Gait: 8 min  Forward walking on treadmill 5 min  Retro walking 3 min  -Modalities: 10 min ice + TENS to L knee  -Education: 5 min: on condition and  HEP    PLAN:  Patient will benefit from physical therapy (2-3) x/week for (8-12) weeks including manual therapy, therapeutic exercise, functional activities, modalities, and patient education.    Thank you for this referral.    These services are reasonable and necessary for the conditions set forth above while under my care.    Yudi Mercado, PT, DPT

## 2018-01-11 ENCOUNTER — PATIENT MESSAGE (OUTPATIENT)
Dept: ORTHOPEDICS | Facility: CLINIC | Age: 53
End: 2018-01-11

## 2018-01-12 ENCOUNTER — CLINICAL SUPPORT (OUTPATIENT)
Dept: REHABILITATION | Facility: HOSPITAL | Age: 53
End: 2018-01-12
Payer: COMMERCIAL

## 2018-01-12 DIAGNOSIS — Z98.890 S/P ACL RECONSTRUCTION: Primary | ICD-10-CM

## 2018-01-12 PROCEDURE — 97110 THERAPEUTIC EXERCISES: CPT

## 2018-01-12 PROCEDURE — 97014 ELECTRIC STIMULATION THERAPY: CPT

## 2018-01-12 PROCEDURE — 97116 GAIT TRAINING THERAPY: CPT

## 2018-01-12 NOTE — PROGRESS NOTES
PHYSICAL THERAPY DAILY NOTE    Referring Provider:  Yeny Rahman    Diagnosis:       ICD-10-CM ICD-9-CM    1. S/P ACL reconstruction Z98.890 V45.89      Orders:  Evaluate and Treat    Date of Initial Evaluation: 12/21/17    Visit # 7 of 12.    BACKGROUND:  Patient reports that approximately 1 year ago is when she initially tore her ACL. She had been performing prehab for several months until she was finally able to undergo surgery on 12/14/17. She underwent an allograft and has Wbing precautions at 40%. She reports her pain as not been too bad, but she is noticing some swelling.    Main complaint: Swelling, increased pain with certain activities    Pain: 3 /10, located L anterior knee, described as achy    SUBJECTIVE: Patient reports her knee has been feeling good, but still difficulty with keeping brace up.    OBJECTIVE:          AT EVAL       TODAY  Gait: Antalgic to L side, decreased weight shift, decreased heel to toe    Sensation: intact to light touch     Knee AROM:  Flexion      85      Extension    3 (into flexion)  Knee PROM  Flexion      85      Extension    0        Strength:  Quadriceps    4      Hamstrings    4     Gluteus Medius   3+      Gluteus Jaskaran   3     Hip Flexors    3+ (noted quad lag with SLR)            Function:  LEFS      55% disability on initial evaluation.    Tenderness to palpation:  Tenderness to patella    Other: Increased swelling to L side vs R side; not utilizing brace appropriately with hinged knee brace way lower    ASSESSMENT: Patient demonstrated good tolerance to forward and retro walking without any increased pain. Patient educated that she needs to continue use of brace and crutches until told she can perform WBAT by MD.    TREATMENT PROVIDED:  -Manual Therapy:  3 min  Patellar mobilizations  -Therapeutic Exercise:  30 min  Scifit x 8 min  Heels slides  Heel raises on foam  Standing balance on foam eyes closed  Knee EX stretch  Standing gastroc stretch  Standing  hip FL/AB/EX on foam  - Gait: 8 min  Forward walking on treadmill 5 min  Retro walking 3 min  -Modalities: 15 min ice + TENS to L knee  -Education: 5 min: on condition and HEP    PLAN:  Patient will benefit from physical therapy (2-3) x/week for (8-12) weeks including manual therapy, therapeutic exercise, functional activities, modalities, and patient education.    Thank you for this referral.    These services are reasonable and necessary for the conditions set forth above while under my care.    Yudi Mercado, PT, DPT

## 2018-01-15 NOTE — PROGRESS NOTES
PHYSICAL THERAPY DAILY NOTE    Referring Provider:  Yeny Rahman    Diagnosis:       ICD-10-CM ICD-9-CM    1. S/P ACL reconstruction Z98.890 V45.89      Orders:  Evaluate and Treat    Date of Initial Evaluation: 12/21/17 (re-evaluation 1/21/18)    Visit # 8 of 12.    BACKGROUND:  Patient reports that approximately 1 year ago is when she initially tore her ACL. She had been performing prehab for several months until she was finally able to undergo surgery on 12/14/17. She underwent an allograft and has Wbing precautions at 40%. She reports her pain as not been too bad, but she is noticing some swelling.    Main complaint: Swelling, increased pain with certain activities    SUBJECTIVE: Patient reports being a little more sore today than normal and unsure if it is due to the weather or due to performing more stretching.    OBJECTIVE:          AT EVAL       TODAY  Gait: Antalgic to L side, decreased weight shift, decreased heel to toe    Sensation: intact to light touch     Knee AROM:  Flexion      85      Extension    3 (into flexion)  Knee PROM  Flexion      85      Extension    0        Strength:  Quadriceps    4      Hamstrings    4     Gluteus Medius   3+      Gluteus Jaskaran   3     Hip Flexors    3+ (noted quad lag with SLR)            Function:  LEFS      55% disability on initial evaluation.    Tenderness to palpation:  Tenderness to patella    Other: Increased swelling to L side vs R side; not utilizing brace appropriately with hinged knee brace way lower    ASSESSMENT: Due to patient being about 4 weeks out, starting some partial squats on the total gym with good tolerance and no increase in pain. Also tolerated improvement into L knee FL ROM with some discomfort, but educated to slowly start performing heel slides more in order to decrease stiffness.     TREATMENT PROVIDED:  -Manual Therapy:  5 min  Patellar mobilizations  STM to L incision site  -Therapeutic Exercise:  35 min  Scifit x 8 min  Heels  slides  Heel raises on foam  Standing balance on foam eyes closed  Knee EX stretch  Standing gastroc stretch  Standing hip FL/AB/EX on foam  Total gym 3 bands partial squat  TKE against G TB  - Gait: 8 min  Forward walking on treadmill 5 min  Retro walking 3 min  -Modalities: 10 min ice + TENS to L knee  -Education: 5 min: on condition and HEP    PLAN:  Patient will benefit from physical therapy (2-3) x/week for (8-12) weeks including manual therapy, therapeutic exercise, functional activities, modalities, and patient education.    Thank you for this referral.    These services are reasonable and necessary for the conditions set forth above while under my care.    Yudi Mercado, PT, DPT

## 2018-01-16 ENCOUNTER — CLINICAL SUPPORT (OUTPATIENT)
Dept: REHABILITATION | Facility: HOSPITAL | Age: 53
End: 2018-01-16
Payer: COMMERCIAL

## 2018-01-16 DIAGNOSIS — Z98.890 S/P ACL RECONSTRUCTION: Primary | ICD-10-CM

## 2018-01-16 PROCEDURE — 97014 ELECTRIC STIMULATION THERAPY: CPT

## 2018-01-16 PROCEDURE — 97116 GAIT TRAINING THERAPY: CPT

## 2018-01-16 PROCEDURE — 97110 THERAPEUTIC EXERCISES: CPT

## 2018-01-17 ENCOUNTER — PATIENT MESSAGE (OUTPATIENT)
Dept: ORTHOPEDICS | Facility: CLINIC | Age: 53
End: 2018-01-17

## 2018-01-18 ENCOUNTER — PATIENT MESSAGE (OUTPATIENT)
Dept: ORTHOPEDICS | Facility: CLINIC | Age: 53
End: 2018-01-18

## 2018-01-19 ENCOUNTER — CLINICAL SUPPORT (OUTPATIENT)
Dept: REHABILITATION | Facility: HOSPITAL | Age: 53
End: 2018-01-19
Payer: COMMERCIAL

## 2018-01-19 DIAGNOSIS — Z98.890 S/P ACL RECONSTRUCTION: Primary | ICD-10-CM

## 2018-01-19 PROCEDURE — 97110 THERAPEUTIC EXERCISES: CPT

## 2018-01-19 PROCEDURE — 97014 ELECTRIC STIMULATION THERAPY: CPT

## 2018-01-19 NOTE — PROGRESS NOTES
PHYSICAL THERAPY DAILY NOTE    Referring Provider:  Yeny Rahman    Diagnosis:       ICD-10-CM ICD-9-CM    1. S/P ACL reconstruction Z98.890 V45.89      Orders:  Evaluate and Treat    Date of Initial Evaluation: 12/21/17 (re-evaluation 1/21/18)    Visit # 9 of 12.    BACKGROUND:  Patient reports that approximately 1 year ago is when she initially tore her ACL. She had been performing prehab for several months until she was finally able to undergo surgery on 12/14/17. She underwent an allograft and has Wbing precautions at 40%. She reports her pain as not been too bad, but she is noticing some swelling.    Main complaint: Swelling, increased pain with certain activities    SUBJECTIVE: Patient reports her knee has been feeling a little better, but is stiff.    OBJECTIVE:          AT EVAL       TODAY  Gait: Antalgic to L side, decreased weight shift, decreased heel to toe    Sensation: intact to light touch     Knee AROM:  Flexion      85      Extension    3 (into flexion)  Knee PROM  Flexion      85      Extension    0        Strength:  Quadriceps    4      Hamstrings    4     Gluteus Medius   3+      Gluteus Jaskaran   3     Hip Flexors    3+ (noted quad lag with SLR)            Function:  LEFS      55% disability on initial evaluation.    Tenderness to palpation:  Tenderness to patella    Other: Increased swelling to L side vs R side; not utilizing brace appropriately with hinged knee brace way lower    ASSESSMENT: Patient demonstrated increased tightness to posterior knee with improvement noted by end of session with STM.    TREATMENT PROVIDED:  -Manual Therapy:  5 min  Patellar mobilizations  STM to posterior knee  -Therapeutic Exercise:  40 min  Scifit x 8 min  Heels slides  Heel raises on foam  Standing balance on foam eyes closed  Knee EX stretch  Standing gastroc stretch  Standing hip FL/AB/EX on foam  Total gym 3 bands partial squat  TKE against G TB  - Gait: 8 min  Forward walking on treadmill 5  min  Retro walking 3 min  -Modalities: 10 min ice + TENS to L knee  -Education: 5 min: on condition and HEP    PLAN:  Patient will benefit from physical therapy (2-3) x/week for (8-12) weeks including manual therapy, therapeutic exercise, functional activities, modalities, and patient education.    Thank you for this referral.    These services are reasonable and necessary for the conditions set forth above while under my care.    Yudi Mercado, PT, DPT

## 2018-01-21 DIAGNOSIS — F41.9 ANXIETY AND DEPRESSION: ICD-10-CM

## 2018-01-21 DIAGNOSIS — F32.A ANXIETY AND DEPRESSION: ICD-10-CM

## 2018-01-21 RX ORDER — FLUOXETINE HYDROCHLORIDE 40 MG/1
CAPSULE ORAL
Qty: 60 CAPSULE | Refills: 6 | Status: SHIPPED | OUTPATIENT
Start: 2018-01-21 | End: 2018-08-18 | Stop reason: SDUPTHER

## 2018-01-22 NOTE — PROGRESS NOTES
PHYSICAL THERAPY RE-EVALUATION    Referring Provider:  Yeny Rahman    Diagnosis:       ICD-10-CM ICD-9-CM    1. S/P ACL reconstruction Z98.890 V45.89      Orders:  Evaluate and Treat    Date of Initial Evaluation: 12/21/17    Visit # 10 of 12.    BACKGROUND:  Patient reports that approximately 1 year ago is when she initially tore her ACL. She had been performing prehab for several months until she was finally able to undergo surgery on 12/14/17. She underwent an allograft and has Wbing precautions at 40%. She reports her pain as not been too bad, but she is noticing some swelling.      SUBJECTIVE: Patient reports her knee is continuing to feel better, but still a little swelling and stiff.    OBJECTIVE:          AT EVAL       TODAY  Gait: Antalgic to L side, decreased weight shift, decreased heel to toe    Sensation: intact to light touch     Knee AROM:  Flexion      120      Extension    0  Knee PROM  Flexion      125      Extension    -2        Strength:  Quadriceps    4      Hamstrings    4     Gluteus Medius   3+      Gluteus Jaskaran   3+     Hip Flexors    4 (noted slight quad lag with SLR)            Function:  LEFS 55% disability on initial evaluation. Patient scored a 43.75% on re-evaluation on the LEFS.    Tenderness to palpation:  Slight tenderness to L distal incision    ASSESSMENT:  Patient demonstrated improved tolerance to gait training, decreased pain, improvement in strength, and improved tolerance to more standing there ex. Patient continues to need skilled services in order to return to PLOF.    Short Term Goals:  1-4 weeks  1. I with HEP MET  2. Increase A/PROM from 0-90 degrees MET  3. Increase hip/knee strength 1/2 grade ALMOST MET  4. Pt demo decreased swelling at mid patella ALMOST MET    Long Term Goals: 5-12 weeks  1.Ambulate with normalized gait pattern  2. Hip/knee strength WNL  3. Knee ROM WNL  4. Decrease Pain to 0 with all activities    TREATMENT PROVIDED:  -Manual Therapy:   2 min  Patellar mobilizations  Incision to incision sites  -Therapeutic Exercise:  35 min  Scifit x 8 min  Heels slides  Heel raises on foam with 3 second holds  Standing balance on bosu ball eyes closed  Standing gastroc stretch  Standing hip FL/AB/EX on foam  Total gym 3 bands partial squat  TKE against G TB  - Gait: 8 min  Forward walking on treadmill 5 min with increased speed  Retro walking 3 min  -Modalities: 15 min ice + TENS to L knee with prolonged knee extension  -Education: 5 min: on condition and HEP    PLAN:  Patient will benefit from physical therapy (2-3) x/week for (8-12) weeks including manual therapy, therapeutic exercise, functional activities, modalities, and patient education.    Thank you for this referral.    These services are reasonable and necessary for the conditions set forth above while under my care.    Yudi Mercado, PT, DPT

## 2018-01-23 ENCOUNTER — CLINICAL SUPPORT (OUTPATIENT)
Dept: REHABILITATION | Facility: HOSPITAL | Age: 53
End: 2018-01-23
Payer: COMMERCIAL

## 2018-01-23 DIAGNOSIS — Z98.890 S/P ACL RECONSTRUCTION: Primary | ICD-10-CM

## 2018-01-23 PROCEDURE — 97110 THERAPEUTIC EXERCISES: CPT

## 2018-01-23 PROCEDURE — 97116 GAIT TRAINING THERAPY: CPT

## 2018-01-23 PROCEDURE — 97014 ELECTRIC STIMULATION THERAPY: CPT

## 2018-01-24 NOTE — PROGRESS NOTES
PHYSICAL THERAPY DAILY NOTE    Referring Provider:  Yeny Rahman    Diagnosis:       ICD-10-CM ICD-9-CM    1. S/P ACL reconstruction Z98.890 V45.89      Orders:  Evaluate and Treat    Date of Initial Evaluation: 12/21/17    Visit # 11 of 12.    BACKGROUND:  Patient reports that approximately 1 year ago is when she initially tore her ACL. She had been performing prehab for several months until she was finally able to undergo surgery on 12/14/17. She underwent an allograft and has Wbing precautions at 40%. She reports her pain as not been too bad, but she is noticing some swelling.      SUBJECTIVE: Patient reports still continuing to feel a little better with some stiffness noted, but was able to cross leg in order to put shoe on.    OBJECTIVE:          AT EVAL       TODAY  Gait: Antalgic to L side, decreased weight shift, decreased heel to toe    Sensation: intact to light touch     Knee AROM:  Flexion      120      Extension    0  Knee PROM  Flexion      125      Extension    -2        Strength:  Quadriceps    4      Hamstrings    4     Gluteus Medius   3+      Gluteus Jaskaran   3+     Hip Flexors    4 (noted slight quad lag with SLR)            Function:  LEFS 55% disability on initial evaluation. Patient scored a 43.75% on re-evaluation on the LEFS.    Tenderness to palpation:  Slight tenderness to L distal incision    ASSESSMENT:  Patient demonstrated good tolerance to bike without any increase in pain when going into knee FL.    TREATMENT PROVIDED:  -Manual Therapy:  5 min  Patellar mobilizations  Incision to incision sites  STM to posterior knee  -Therapeutic Exercise:  35 min  Scifit x 8 min  Heels slides  Heel raises on foam with 3 second holds  Standing balance on bosu ball eyes open  Standing gastroc stretch  Standing hip FL/AB/EX on foam  Total gym 4 bands partial squat  TKE against G TB  - Gait: 8 min  Forward walking on treadmill 6 min with increased speed  Retro walking 3 min  -Modalities: 15  min ice + TENS to L knee with prolonged knee extension  -Education: 5 min: on condition and HEP    PLAN:  Patient will benefit from physical therapy (2-3) x/week for (8-12) weeks including manual therapy, therapeutic exercise, functional activities, modalities, and patient education.    Thank you for this referral.    These services are reasonable and necessary for the conditions set forth above while under my care.    Yudi Mercado, PT, DPT

## 2018-01-25 ENCOUNTER — CLINICAL SUPPORT (OUTPATIENT)
Dept: REHABILITATION | Facility: HOSPITAL | Age: 53
End: 2018-01-25
Payer: COMMERCIAL

## 2018-01-25 DIAGNOSIS — Z98.890 S/P ACL RECONSTRUCTION: Primary | ICD-10-CM

## 2018-01-25 PROCEDURE — 97110 THERAPEUTIC EXERCISES: CPT

## 2018-01-25 PROCEDURE — 97014 ELECTRIC STIMULATION THERAPY: CPT

## 2018-01-25 PROCEDURE — 97116 GAIT TRAINING THERAPY: CPT

## 2018-01-31 ENCOUNTER — OFFICE VISIT (OUTPATIENT)
Dept: ORTHOPEDICS | Facility: CLINIC | Age: 53
End: 2018-01-31
Payer: COMMERCIAL

## 2018-01-31 ENCOUNTER — CLINICAL SUPPORT (OUTPATIENT)
Dept: REHABILITATION | Facility: HOSPITAL | Age: 53
End: 2018-01-31
Payer: COMMERCIAL

## 2018-01-31 VITALS
HEIGHT: 64 IN | SYSTOLIC BLOOD PRESSURE: 103 MMHG | WEIGHT: 226 LBS | HEART RATE: 72 BPM | RESPIRATION RATE: 14 BRPM | DIASTOLIC BLOOD PRESSURE: 71 MMHG | BODY MASS INDEX: 38.58 KG/M2

## 2018-01-31 DIAGNOSIS — S83.512A RUPTURE OF ANTERIOR CRUCIATE LIGAMENT OF LEFT KNEE, INITIAL ENCOUNTER: Primary | ICD-10-CM

## 2018-01-31 DIAGNOSIS — Z09 POSTOP CHECK: ICD-10-CM

## 2018-01-31 DIAGNOSIS — Z98.890 S/P ACL RECONSTRUCTION: Primary | ICD-10-CM

## 2018-01-31 PROCEDURE — 97116 GAIT TRAINING THERAPY: CPT

## 2018-01-31 PROCEDURE — 97014 ELECTRIC STIMULATION THERAPY: CPT

## 2018-01-31 PROCEDURE — 99999 PR PBB SHADOW E&M-EST. PATIENT-LVL III: CPT | Mod: PBBFAC,,, | Performed by: PHYSICIAN ASSISTANT

## 2018-01-31 PROCEDURE — 99024 POSTOP FOLLOW-UP VISIT: CPT | Mod: S$GLB,,, | Performed by: PHYSICIAN ASSISTANT

## 2018-01-31 PROCEDURE — 97110 THERAPEUTIC EXERCISES: CPT

## 2018-01-31 NOTE — PROGRESS NOTES
Patient ID: Taryn Serrano is a 52 y.o. female.    Chief Complaint: Pain and Follow-up of the Left Knee      HPI: Taryn Serrano  is a 52 y.o. female who c/o Pain and Follow-up of the Left Knee   for duration of about 6 weeks.  She underwent a left knee anterior cruciate ligament reconstruction by Dr. Daly on 12/14/17.  She has been working with physical therapy.  She is still using her brace at times as well as using 1 crutch at times.  She is feeling great.  She states the stability of the knee is improved as well.  Pain level is 1 out of 10 in severity.  Quality is an occasional ache.  She is off Pain medications.        Objective:                    Left Knee Exam     Inspection   Erythema: absent  Scars: present (consistent with anterior cruciate ligament reconstruction..  All scars are well-healed with no sign or symptom of infection.)  Swelling: absent  Effusion: absent  Deformity: deformity  Bruising: absent    Range of Motion   Extension: normal   Flexion:  120 abnormal     Comments:  Sensation intact to light touch.  Compartments are soft.  Capillary refill less than 2 seconds.  No pretibial edema.  Good overall stability of the left knee.              Assessment:       Encounter Diagnoses   Name Primary?    Rupture of anterior cruciate ligament of left knee, initial encounter Yes    Postop check           Plan:       Taryn was seen today for pain and follow-up.    Diagnoses and all orders for this visit:    Rupture of anterior cruciate ligament of left knee, initial encounter    Postop check    Ms. Centeno comes in today for left knee follow-up.  She is improving well as far as pain is concerned.  I had a long discussion with her.  Although her strength is improving and her pain is improving, she understands that she is not at maximal improvement for the left knee.  She needs to continue to avoid cutting exercises deep squats, as well as jogging.  I have informed her that typically jogging  occurs around the 4 month rajeev is on his and his straight line.  Cutting type of exercises do not typically incur until the 6 month rajeev.  She tells me she is not concerned about any of those things that she does not have them.  She has asked about being able to climb up and down stairs.  I think they as she continues to progress with physical therapy, that will get easier for her.  She does still have some muscle atrophy in the quads of the left knee.  This also will improve with physical therapy.  He verbalizes understanding and agrees.  She will return to clinic in 6 weeks for reevaluation.  At that time would like to see her when Dr. Oswald's clinic so that we can talk about further progression of activities.  She verbalizes understanding and agrees with the above plan.  Follow-up in about 6 weeks (around 3/14/2018).          The patient understands, chooses and consents to this plan and accepts all   the risks which include but are not limited to the risks mentioned above.     Disclaimer: This note was prepared using a voice recognition system and is likely to have sound alike errors within the text.

## 2018-02-05 NOTE — PROGRESS NOTES
PHYSICAL THERAPY DAILY NOTE    Referring Provider:  Yeny Rahman    Diagnosis:       ICD-10-CM ICD-9-CM    1. S/P ACL reconstruction Z98.890 V45.89      Orders:  Evaluate and Treat    Date of Initial Evaluation: 12/21/17 (re-eval needed for 2/23/18)    Visit # 13     BACKGROUND:  Patient reports that approximately 1 year ago is when she initially tore her ACL. She had been performing prehab for several months until she was finally able to undergo surgery on 12/14/17. She underwent an allograft and has Wbing precautions at 40%. She reports her pain as not been too bad, but she is noticing some swelling.      SUBJECTIVE: Patient reports that she did a twisting movement to her knee over the weekend and has had some pain since.    OBJECTIVE:          AT EVAL       TODAY  Gait: Antalgic to L side, decreased weight shift, decreased heel to toe    Sensation: intact to light touch     Knee AROM:  Flexion      120      Extension    0  Knee PROM  Flexion      125      Extension    -2        Strength:  Quadriceps    4      Hamstrings    4     Gluteus Medius   3+      Gluteus Jaskaran   3+     Hip Flexors    4 (noted slight quad lag with SLR)            Function:  LEFS 55% disability on initial evaluation. Patient scored a 43.75% on re-evaluation on the LEFS.    Tenderness to palpation:  Slight tenderness to L distal incision    ASSESSMENT: Patient demonstrated good tolerance to there ex today with slight increase in knee pain with more lateral movements and slightly with step downs. Patient given lateral lunges to do at home to improve tolerance to side to side movements within pain free range and also educated to improve posterior knee tightness with calf stretching.    TREATMENT PROVIDED:  -Manual Therapy:  3 min  Patellar mobilizations  STM to posterior knee  -Therapeutic Exercise:  35 min  Scifit x 5 min  Heels slides  LE bike x 5 min  Standing balance on bosu ball eyes open  Standing gastroc stretch  Step  ups  Step downs  Lateral lunges  Mini wall squats  SL stance on firm ground/foam  - Gait: 8 min  Forward walking on treadmill 5 min with increased speed  Retro walking 4 min  -Modalities: 15 min ice + TENS to L knee with prolonged knee extension  -Education: 5 min: on condition and HEP    PLAN:  Patient will benefit from physical therapy (2-3) x/week for (8-12) weeks including manual therapy, therapeutic exercise, functional activities, modalities, and patient education.    Thank you for this referral.    These services are reasonable and necessary for the conditions set forth above while under my care.    Yudi Mercado, PT, DPT

## 2018-02-07 ENCOUNTER — CLINICAL SUPPORT (OUTPATIENT)
Dept: REHABILITATION | Facility: HOSPITAL | Age: 53
End: 2018-02-07
Payer: COMMERCIAL

## 2018-02-07 DIAGNOSIS — Z98.890 S/P ACL RECONSTRUCTION: Primary | ICD-10-CM

## 2018-02-07 PROCEDURE — 97014 ELECTRIC STIMULATION THERAPY: CPT

## 2018-02-07 PROCEDURE — 97116 GAIT TRAINING THERAPY: CPT

## 2018-02-07 PROCEDURE — 97110 THERAPEUTIC EXERCISES: CPT

## 2018-02-08 NOTE — PROGRESS NOTES
PHYSICAL THERAPY DAILY NOTE    Referring Provider:  Yeny Rahman    Diagnosis:       ICD-10-CM ICD-9-CM    1. S/P ACL reconstruction Z98.890 V45.89      Orders:  Evaluate and Treat    Date of Initial Evaluation: 12/21/17 (re-eval needed for 2/23/18)    Visit # 14     BACKGROUND:  Patient reports that approximately 1 year ago is when she initially tore her ACL. She had been performing prehab for several months until she was finally able to undergo surgery on 12/14/17. She underwent an allograft and has Wbing precautions at 40%. She reports her pain as not been too bad, but she is noticing some swelling.      SUBJECTIVE:  Patient reports she is still feeling stiff and increased stiffness to posterior knee.    OBJECTIVE:          AT EVAL       TODAY  Gait: Antalgic to L side, decreased weight shift, decreased heel to toe    Sensation: intact to light touch     Knee AROM:  Flexion      120      Extension    0  Knee PROM  Flexion      125      Extension    -2        Strength:  Quadriceps    4      Hamstrings    4     Gluteus Medius   3+      Gluteus Jaskaran   3+     Hip Flexors    4 (noted slight quad lag with SLR)            Function:  LEFS 55% disability on initial evaluation. Patient scored a 43.75% on re-evaluation on the LEFS.    Tenderness to palpation:  Slight tenderness to L distal incision    ASSESSMENT: Patient demonstrated good tolerance to there ex without any increase in pain and reported slight improvement in symptoms after STM to posterior knee.    TREATMENT PROVIDED:  -Manual Therapy:  6 min  Patellar mobilizations  STM to posterior knee  -Therapeutic Exercise:  35 min  Scifit x 5 min  Heels slides  LE bike x 5 min  Standing balance on bosu ball eyes open  Standing gastroc stretch  Step ups  Step downs  Lateral lunges  Mini wall squats  SL stance with ball toss and on foam  - Gait: 8 min  Forward walking on treadmill 5 min with increased speed  Retro walking 4 min  -Modalities: 15 min ice + TENS  to L knee with prolonged knee extension  -Education: 5 min: on condition and HEP    PLAN:  Patient will benefit from physical therapy (2-3) x/week for (8-12) weeks including manual therapy, therapeutic exercise, functional activities, modalities, and patient education.    Thank you for this referral.    These services are reasonable and necessary for the conditions set forth above while under my care.    Yudi Mercado, PT, DPT

## 2018-02-09 ENCOUNTER — LAB VISIT (OUTPATIENT)
Dept: LAB | Facility: HOSPITAL | Age: 53
End: 2018-02-09
Attending: INTERNAL MEDICINE
Payer: COMMERCIAL

## 2018-02-09 ENCOUNTER — CLINICAL SUPPORT (OUTPATIENT)
Dept: REHABILITATION | Facility: HOSPITAL | Age: 53
End: 2018-02-09
Payer: COMMERCIAL

## 2018-02-09 DIAGNOSIS — E78.00 PURE HYPERCHOLESTEROLEMIA: ICD-10-CM

## 2018-02-09 DIAGNOSIS — E03.9 ACQUIRED HYPOTHYROIDISM: ICD-10-CM

## 2018-02-09 DIAGNOSIS — Z98.890 S/P ACL RECONSTRUCTION: Primary | ICD-10-CM

## 2018-02-09 DIAGNOSIS — Z29.9 PREVENTIVE MEASURE: ICD-10-CM

## 2018-02-09 LAB
25(OH)D3+25(OH)D2 SERPL-MCNC: 51 NG/ML
ALBUMIN SERPL BCP-MCNC: 3.2 G/DL
ALP SERPL-CCNC: 105 U/L
ALT SERPL W/O P-5'-P-CCNC: 13 U/L
ANION GAP SERPL CALC-SCNC: 7 MMOL/L
AST SERPL-CCNC: 18 U/L
BASOPHILS # BLD AUTO: 0.03 K/UL
BASOPHILS NFR BLD: 0.5 %
BILIRUB SERPL-MCNC: 0.4 MG/DL
BUN SERPL-MCNC: 18 MG/DL
CALCIUM SERPL-MCNC: 9.3 MG/DL
CHLORIDE SERPL-SCNC: 103 MMOL/L
CHOLEST SERPL-MCNC: 233 MG/DL
CHOLEST/HDLC SERPL: 3.5 {RATIO}
CO2 SERPL-SCNC: 30 MMOL/L
CREAT SERPL-MCNC: 1.1 MG/DL
DIFFERENTIAL METHOD: NORMAL
EOSINOPHIL # BLD AUTO: 0.1 K/UL
EOSINOPHIL NFR BLD: 2 %
ERYTHROCYTE [DISTWIDTH] IN BLOOD BY AUTOMATED COUNT: 13.9 %
EST. GFR  (AFRICAN AMERICAN): >60 ML/MIN/1.73 M^2
EST. GFR  (NON AFRICAN AMERICAN): 57.1 ML/MIN/1.73 M^2
ESTIMATED AVG GLUCOSE: 94 MG/DL
GLUCOSE SERPL-MCNC: 91 MG/DL
HBA1C MFR BLD HPLC: 4.9 %
HCT VFR BLD AUTO: 39.9 %
HDLC SERPL-MCNC: 66 MG/DL
HDLC SERPL: 28.3 %
HGB BLD-MCNC: 13 G/DL
IMM GRANULOCYTES # BLD AUTO: 0.01 K/UL
IMM GRANULOCYTES NFR BLD AUTO: 0.2 %
LDLC SERPL CALC-MCNC: 138.2 MG/DL
LYMPHOCYTES # BLD AUTO: 1.2 K/UL
LYMPHOCYTES NFR BLD: 20.6 %
MCH RBC QN AUTO: 29.1 PG
MCHC RBC AUTO-ENTMCNC: 32.6 G/DL
MCV RBC AUTO: 89 FL
MONOCYTES # BLD AUTO: 0.4 K/UL
MONOCYTES NFR BLD: 7.6 %
NEUTROPHILS # BLD AUTO: 3.9 K/UL
NEUTROPHILS NFR BLD: 69.1 %
NONHDLC SERPL-MCNC: 167 MG/DL
NRBC BLD-RTO: 0 /100 WBC
PLATELET # BLD AUTO: 199 K/UL
PMV BLD AUTO: 9.8 FL
POTASSIUM SERPL-SCNC: 4.6 MMOL/L
PROT SERPL-MCNC: 7.1 G/DL
RBC # BLD AUTO: 4.47 M/UL
SODIUM SERPL-SCNC: 140 MMOL/L
TRIGL SERPL-MCNC: 144 MG/DL
TSH SERPL DL<=0.005 MIU/L-ACNC: 1.25 UIU/ML
WBC # BLD AUTO: 5.63 K/UL

## 2018-02-09 PROCEDURE — 97116 GAIT TRAINING THERAPY: CPT

## 2018-02-09 PROCEDURE — 80061 LIPID PANEL: CPT

## 2018-02-09 PROCEDURE — 80053 COMPREHEN METABOLIC PANEL: CPT

## 2018-02-09 PROCEDURE — 36415 COLL VENOUS BLD VENIPUNCTURE: CPT | Mod: PO

## 2018-02-09 PROCEDURE — 97014 ELECTRIC STIMULATION THERAPY: CPT

## 2018-02-09 PROCEDURE — 84443 ASSAY THYROID STIM HORMONE: CPT

## 2018-02-09 PROCEDURE — 82306 VITAMIN D 25 HYDROXY: CPT

## 2018-02-09 PROCEDURE — 85025 COMPLETE CBC W/AUTO DIFF WBC: CPT

## 2018-02-09 PROCEDURE — 97110 THERAPEUTIC EXERCISES: CPT

## 2018-02-09 PROCEDURE — 83036 HEMOGLOBIN GLYCOSYLATED A1C: CPT

## 2018-02-12 ENCOUNTER — OFFICE VISIT (OUTPATIENT)
Dept: ORTHOPEDICS | Facility: CLINIC | Age: 53
End: 2018-02-12
Payer: COMMERCIAL

## 2018-02-12 ENCOUNTER — PATIENT MESSAGE (OUTPATIENT)
Dept: ORTHOPEDICS | Facility: CLINIC | Age: 53
End: 2018-02-12

## 2018-02-12 VITALS
HEIGHT: 64 IN | SYSTOLIC BLOOD PRESSURE: 117 MMHG | HEART RATE: 73 BPM | BODY MASS INDEX: 38.58 KG/M2 | RESPIRATION RATE: 16 BRPM | WEIGHT: 226 LBS | DIASTOLIC BLOOD PRESSURE: 75 MMHG

## 2018-02-12 DIAGNOSIS — Z98.890 S/P ACL RECONSTRUCTION: Primary | ICD-10-CM

## 2018-02-12 DIAGNOSIS — Z09 POSTOP CHECK: ICD-10-CM

## 2018-02-12 PROCEDURE — 99999 PR PBB SHADOW E&M-EST. PATIENT-LVL IV: CPT | Mod: PBBFAC,,, | Performed by: PHYSICIAN ASSISTANT

## 2018-02-12 PROCEDURE — 99024 POSTOP FOLLOW-UP VISIT: CPT | Mod: S$GLB,,, | Performed by: PHYSICIAN ASSISTANT

## 2018-02-12 RX ORDER — MELOXICAM 15 MG/1
15 TABLET ORAL DAILY
Qty: 30 TABLET | Refills: 0 | Status: SHIPPED | OUTPATIENT
Start: 2018-02-12 | End: 2018-11-05

## 2018-02-12 NOTE — PROGRESS NOTES
Patient ID: Taryn Serrano is a 52 y.o. female.    Chief Complaint: Pain and Swelling of the Left Knee      HPI: Taryn Serrano  is a 52 y.o. female who c/o Pain and Swelling of the Left Knee   for duration of 2 months.  She underwent a left knee anterior cruciate ligament reconstruction by Dr. Daly on 12/14/17.  Unfortunately, last night, over a dog while she was dog sitting.  She inadvertently stood left knee and landed on the couch.  She has had an increase in pain symptoms since then and wanted to get the knee checked out.  He complains of this mild increase in swelling.  Pain level today is 5 out of 10 in severity.  Quality is an aching pain.  Alleviating factors include rest.  Aggravating factors include weightbearing and movement.  Up until last night, she was out of her brace and off of her crutches.  However, today she comes in on crutches.  She denies instability at this time of the left knee.        Objective:                  Right Knee Exam     Tests   Ligament Examination Dial Test at 30 degrees: normal (< 5 degrees)Dial Test at 90 degrees: normal (< 5 degrees)    Left Knee Exam     Inspection   Erythema: absent  Scars: present (healed scars consistent with recent anterior cruciate ligament reconstruction)  Swelling: present (mild)  Effusion: absent  Deformity: deformity  Bruising: absent    Tenderness   The patient is experiencing no tenderness.         Range of Motion   Extension: normal   Flexion:  120 abnormal     Tests   Meniscus   Rosalia:  Medial - negative Lateral - negative  Stability   Lachman: abnormal  - grade IIPCL-Posterior Drawer: normal (0 to 2mm)  MCL - Valgus: normal (0 to 2mm)  LCL - Varus: normal (0 to 2mm)Dial Test at 30 degrees: normal (< 5 degrees)Dial Test at 90 degrees: normal (< 5 degrees)    Other   Sensation: normal    Vascular Exam       Edema  Left Lower Leg: absent              Assessment:       Encounter Diagnoses   Name Primary?    S/P ACL reconstruction  Yes    Postop check           Plan:       Taryn was seen today for pain and swelling.    Diagnoses and all orders for this visit:    S/P ACL reconstruction  -     meloxicam (MOBIC) 15 MG tablet; Take 1 tablet (15 mg total) by mouth once daily. Take with foodl    Postop check  -     meloxicam (MOBIC) 15 MG tablet; Take 1 tablet (15 mg total) by mouth once daily. Take with foodl    Ms. Centeno comes in today to be evaluated for her left knee.  She twisted the knee last night and had an anterior cruciate ligament reconstruction 2 months ago.  Her Lachman does feel lightly loose in comparison to the contralateral side.  She has a negative anterior drawer test.  This point, I would recommend continued physical therapy.  I have advised her to continue using the crutches until she can ambulate without pain.  Additionally, she can use the knee brace for a week or two if needed.  I have unlocked the knee brace fully.  She has good quad control today.  She can wean off of the crutches and out of the brace over the next 1-2 weeks.  Appointment as scheduled in 1 month.  If she has any further problems prior to follow-up, she will notify the office.  Additionally, I have informed her that the graft feels slightly loose today in comparison to the right knee.  We may consider repeating the MRI if she does not improve area.  I have discussed my assessment and plan with Dr. Oswald today who agrees.  She verbalizes understanding and agrees.  Follow-up in about 1 month (around 3/12/2018) for f/u as scheduled.          The patient understands, chooses and consents to this plan and accepts all   the risks which include but are not limited to the risks mentioned above.     Disclaimer: This note was prepared using a voice recognition system and is likely to have sound alike errors within the text.

## 2018-02-13 NOTE — PROGRESS NOTES
PHYSICAL THERAPY DAILY NOTE    Referring Provider:  Yeny Rahman    Diagnosis:       ICD-10-CM ICD-9-CM    1. S/P ACL reconstruction Z98.890 V45.89      Orders:  Evaluate and Treat    Date of Initial Evaluation: 12/21/17 (re-eval needed for 2/23/18)    Visit # 15      BACKGROUND:  Patient reports that approximately 1 year ago is when she initially tore her ACL. She had been performing prehab for several months until she was finally able to undergo surgery on 12/14/17. She underwent an allograft and has Wbing precautions at 40%. She reports her pain as not been too bad, but she is noticing some swelling.      SUBJECTIVE:  Patient reports injuring knee on Sunday this past weekend and went and saw the ortho PA on Monday and reported everything looked fine.    OBJECTIVE:          AT EVAL       TODAY  Gait: Antalgic to L side, decreased weight shift, decreased heel to toe    Sensation: intact to light touch     Knee AROM:  Flexion      120      Extension    0  Knee PROM  Flexion      125      Extension    -2        Strength:  Quadriceps    4      Hamstrings    4     Gluteus Medius   3+      Gluteus Jaskaran   3+     Hip Flexors    4 (noted slight quad lag with SLR)            Function:  LEFS 55% disability on initial evaluation. Patient scored a 43.75% on re-evaluation on the LEFS.    Tenderness to palpation:  Slight tenderness to L distal incision    ASSESSMENT: Patient was having more difficulty today with lateral movements and squats due to twisting movement on Sunday, but did improve with STM to posterior knee.    TREATMENT PROVIDED:  -Manual Therapy:  6 min  Patellar mobilizations  STM to posterior knee  -Therapeutic Exercise:  40 min  Scifit x 5 min  LE bike x 5 min  Standing balance on bosu ball eyes open  Standing gastroc stretch  Step ups  Step downs  Lateral lunges  Mini wall squats  SL stance with ball toss and on foam  - Gait: 5 min  Forward walking on treadmill 5 min with increased  speed  -Modalities: 15 min ice + TENS to L knee with prolonged knee extension  -Education: 5 min: on condition and HEP    PLAN:  Patient will benefit from physical therapy (2-3) x/week for (8-12) weeks including manual therapy, therapeutic exercise, functional activities, modalities, and patient education.    Thank you for this referral.    These services are reasonable and necessary for the conditions set forth above while under my care.    Yudi Mercado, PT, DPT

## 2018-02-14 ENCOUNTER — CLINICAL SUPPORT (OUTPATIENT)
Dept: REHABILITATION | Facility: HOSPITAL | Age: 53
End: 2018-02-14
Payer: COMMERCIAL

## 2018-02-14 DIAGNOSIS — Z98.890 S/P ACL RECONSTRUCTION: Primary | ICD-10-CM

## 2018-02-14 PROCEDURE — 97110 THERAPEUTIC EXERCISES: CPT

## 2018-02-14 PROCEDURE — 97014 ELECTRIC STIMULATION THERAPY: CPT

## 2018-02-16 ENCOUNTER — CLINICAL SUPPORT (OUTPATIENT)
Dept: REHABILITATION | Facility: HOSPITAL | Age: 53
End: 2018-02-16
Payer: COMMERCIAL

## 2018-02-16 ENCOUNTER — OFFICE VISIT (OUTPATIENT)
Dept: INTERNAL MEDICINE | Facility: CLINIC | Age: 53
End: 2018-02-16
Payer: COMMERCIAL

## 2018-02-16 VITALS
HEIGHT: 64 IN | BODY MASS INDEX: 38.09 KG/M2 | WEIGHT: 223.13 LBS | OXYGEN SATURATION: 98 % | SYSTOLIC BLOOD PRESSURE: 130 MMHG | DIASTOLIC BLOOD PRESSURE: 72 MMHG | HEART RATE: 78 BPM | TEMPERATURE: 98 F

## 2018-02-16 DIAGNOSIS — F41.9 ANXIETY AND DEPRESSION: ICD-10-CM

## 2018-02-16 DIAGNOSIS — E55.9 VITAMIN D DEFICIENCY: ICD-10-CM

## 2018-02-16 DIAGNOSIS — E03.9 ACQUIRED HYPOTHYROIDISM: ICD-10-CM

## 2018-02-16 DIAGNOSIS — Z98.890 S/P ACL RECONSTRUCTION: Primary | ICD-10-CM

## 2018-02-16 DIAGNOSIS — J45.990 ASTHMA, EXERCISE INDUCED: ICD-10-CM

## 2018-02-16 DIAGNOSIS — G47.33 OSA (OBSTRUCTIVE SLEEP APNEA): ICD-10-CM

## 2018-02-16 DIAGNOSIS — Z00.00 ENCOUNTER FOR PREVENTIVE HEALTH EXAMINATION: Primary | ICD-10-CM

## 2018-02-16 DIAGNOSIS — F32.A ANXIETY AND DEPRESSION: ICD-10-CM

## 2018-02-16 PROCEDURE — 97110 THERAPEUTIC EXERCISES: CPT

## 2018-02-16 PROCEDURE — 99396 PREV VISIT EST AGE 40-64: CPT | Mod: S$GLB,,, | Performed by: INTERNAL MEDICINE

## 2018-02-16 PROCEDURE — 99999 PR PBB SHADOW E&M-EST. PATIENT-LVL III: CPT | Mod: PBBFAC,,, | Performed by: INTERNAL MEDICINE

## 2018-02-16 PROCEDURE — 97014 ELECTRIC STIMULATION THERAPY: CPT

## 2018-02-16 NOTE — PROGRESS NOTES
PHYSICAL THERAPY DAILY NOTE    Referring Provider:  Yeny Rahman    Diagnosis:       ICD-10-CM ICD-9-CM    1. S/P ACL reconstruction Z98.890 V45.89      Orders:  Evaluate and Treat    Date of Initial Evaluation: 12/21/17 (re-eval needed for 2/23/18)    Visit # 15      BACKGROUND:  Patient reports that approximately 1 year ago is when she initially tore her ACL. She had been performing prehab for several months until she was finally able to undergo surgery on 12/14/17. She underwent an allograft and has Wbing precautions at 40%. She reports her pain as not been too bad, but she is noticing some swelling.      SUBJECTIVE:  Patient reports that her knee was feeling a lot better today    OBJECTIVE:          AT EVAL       TODAY  Gait: Antalgic to L side, decreased weight shift, decreased heel to toe    Sensation: intact to light touch     Knee AROM:  Flexion      120      Extension    0  Knee PROM  Flexion      125      Extension    -2        Strength:  Quadriceps    4      Hamstrings    4     Gluteus Medius   3+      Gluteus Jaskaran   3+     Hip Flexors    4 (noted slight quad lag with SLR)            Function:  LEFS 55% disability on initial evaluation. Patient scored a 43.75% on re-evaluation on the LEFS.    Tenderness to palpation:  Slight tenderness to L distal incision    ASSESSMENT: Patient was able to tolerate there ex better this session, however still having some soreness/pain with lateral lunges.    TREATMENT PROVIDED:  -Manual Therapy:  6 min  Patellar mobilizations  STM to posterior knee  -Therapeutic Exercise:  40 min  Scifit x 5 min  LE bike x 5 min  Standing balance on bosu ball eyes open  Standing gastroc stretch  Step ups  Step downs  Lateral lunges  Mini wall squats  SL stance with ball toss and on foam  - Gait: 5 min  Forward walking on treadmill 5 min with increased speed  -Modalities: 15 min ice + TENS to L knee with prolonged knee extension  -Education: 5 min: on condition and HEP    PLAN:   Patient will benefit from physical therapy (2-3) x/week for (8-12) weeks including manual therapy, therapeutic exercise, functional activities, modalities, and patient education.    Thank you for this referral.    These services are reasonable and necessary for the conditions set forth above while under my care.    Yudi Mercado, PT, DPT

## 2018-02-16 NOTE — PROGRESS NOTES
Subjective:       Patient ID: Taryn Serrano is a 52 y.o. female.    Chief Complaint: No chief complaint on file.    Here for f/u medical problems and preventive exam.  Doing PT for left knee.  Energy ok.  Using cpap.  No f/c/sw/cough.  No cp/sob/palp.  No asthma sx.  Not exercising due to knee situation.  BMs and urine normal.  Taking vit D.    HM: 10/17 fluvax, 1/15 [id number], 10/13 [id number] booster, 7/09 TDaP, 3/17 MMG/ Gyn Dr. Shaffer, 1/16 BMD low fx risk rep 5y, 7/12 Cscope rep 10y, 2/17 HCV neg.      Review of Systems   Constitutional: Negative for appetite change, chills, diaphoresis and fever.   HENT: Negative for congestion, ear pain, rhinorrhea, sinus pressure and sore throat.    Respiratory: Negative for cough, chest tightness and shortness of breath.    Cardiovascular: Negative for chest pain and palpitations.   Gastrointestinal: Negative for blood in stool, constipation, diarrhea, nausea and vomiting.   Genitourinary: Negative for dysuria, frequency, hematuria, menstrual problem, urgency and vaginal discharge.   Musculoskeletal: Negative for arthralgias.   Skin: Negative for rash.   Neurological: Negative for dizziness and headaches.   Psychiatric/Behavioral: Negative for sleep disturbance. The patient is not nervous/anxious.        Objective:   LMP 01/07/2015     Physical Exam   Constitutional: She is oriented to person, place, and time. She appears well-developed and well-nourished.   HENT:   Right Ear: External ear normal. Tympanic membrane is not injected.   Left Ear: External ear normal. Tympanic membrane is not injected.   Mouth/Throat: Oropharynx is clear and moist.   Eyes: Conjunctivae are normal.   Neck: Normal range of motion. Neck supple. No thyromegaly present.   Cardiovascular: Normal rate, regular rhythm and intact distal pulses.  Exam reveals no gallop and no friction rub.    No murmur heard.  Pulmonary/Chest: Effort normal and breath sounds normal. She has no wheezes. She has no  rales.   Abdominal: Soft. Bowel sounds are normal. She exhibits no mass. There is no tenderness.   Musculoskeletal: She exhibits no edema.   Lymphadenopathy:     She has no cervical adenopathy.   Neurological: She is alert and oriented to person, place, and time.   Skin: Skin is warm. No rash noted.   Psychiatric: She has a normal mood and affect.       Results for orders placed or performed in visit on 02/09/18   Comprehensive metabolic panel   Result Value Ref Range    Sodium 140 136 - 145 mmol/L    Potassium 4.6 3.5 - 5.1 mmol/L    Chloride 103 95 - 110 mmol/L    CO2 30 (H) 23 - 29 mmol/L    Glucose 91 70 - 110 mg/dL    BUN, Bld 18 6 - 20 mg/dL    Creatinine 1.1 0.5 - 1.4 mg/dL    Calcium 9.3 8.7 - 10.5 mg/dL    Total Protein 7.1 6.0 - 8.4 g/dL    Albumin 3.2 (L) 3.5 - 5.2 g/dL    Total Bilirubin 0.4 0.1 - 1.0 mg/dL    Alkaline Phosphatase 105 55 - 135 U/L    AST 18 10 - 40 U/L    ALT 13 10 - 44 U/L    Anion Gap 7 (L) 8 - 16 mmol/L    eGFR if African American >60.0 >60 mL/min/1.73 m^2    eGFR if non  57.1 (A) >60 mL/min/1.73 m^2   Lipid panel   Result Value Ref Range    Cholesterol 233 (H) 120 - 199 mg/dL    Triglycerides 144 30 - 150 mg/dL    HDL 66 40 - 75 mg/dL    LDL Cholesterol 138.2 63.0 - 159.0 mg/dL    HDL/Chol Ratio 28.3 20.0 - 50.0 %    Total Cholesterol/HDL Ratio 3.5 2.0 - 5.0    Non-HDL Cholesterol 167 mg/dL   CBC auto differential   Result Value Ref Range    WBC 5.63 3.90 - 12.70 K/uL    RBC 4.47 4.00 - 5.40 M/uL    Hemoglobin 13.0 12.0 - 16.0 g/dL    Hematocrit 39.9 37.0 - 48.5 %    MCV 89 82 - 98 fL    MCH 29.1 27.0 - 31.0 pg    MCHC 32.6 32.0 - 36.0 g/dL    RDW 13.9 11.5 - 14.5 %    Platelets 199 150 - 350 K/uL    MPV 9.8 9.2 - 12.9 fL    Immature Granulocytes 0.2 0.0 - 0.5 %    Gran # (ANC) 3.9 1.8 - 7.7 K/uL    Immature Grans (Abs) 0.01 0.00 - 0.04 K/uL    Lymph # 1.2 1.0 - 4.8 K/uL    Mono # 0.4 0.3 - 1.0 K/uL    Eos # 0.1 0.0 - 0.5 K/uL    Baso # 0.03 0.00 - 0.20 K/uL    nRBC 0  0 /100 WBC    Gran% 69.1 38.0 - 73.0 %    Lymph% 20.6 18.0 - 48.0 %    Mono% 7.6 4.0 - 15.0 %    Eosinophil% 2.0 0.0 - 8.0 %    Basophil% 0.5 0.0 - 1.9 %    Differential Method Automated    TSH   Result Value Ref Range    TSH 1.249 0.400 - 4.000 uIU/mL   Vitamin D   Result Value Ref Range    Vit D, 25-Hydroxy 51 30 - 96 ng/mL   Hemoglobin A1c   Result Value Ref Range    Hemoglobin A1C 4.9 4.0 - 5.6 %    Estimated Avg Glucose 94 68 - 131 mg/dL       Assessment:       1. Encounter for preventive health examination    2. Vitamin D deficiency    3. URBAN (obstructive sleep apnea)    4. Acquired hypothyroidism    5. Anxiety and depression    6. Asthma, exercise induced        Plan:       Taryn was seen today for annual exam.    Diagnoses and all orders for this visit:    Encounter for preventive health examination- utd.  10yr vasc risk 1.8%.  Restart exercise as tolerated.  -     Mammo Digital Screening Bilat with CAD; Future  -     Ambulatory consult to Gynecology    Vitamin D deficiency- cont supplement.    URBAN (obstructive sleep apnea)    Acquired hypothyroidism- Clinically stable, continue present treatment.    Anxiety and depression- cont rxs.    Asthma, exercise induced- doing well.    RTC 6 mo.

## 2018-02-20 NOTE — PROGRESS NOTES
PHYSICAL THERAPY DAILY NOTE    Referring Provider:  Yeny Rahman    Diagnosis:       ICD-10-CM ICD-9-CM    1. S/P ACL reconstruction Z98.890 V45.89      Orders:  Evaluate and Treat    Date of Initial Evaluation: 12/21/17 (re-eval needed for 2/23/18)    Visit # 16     BACKGROUND:  Patient reports that approximately 1 year ago is when she initially tore her ACL. She had been performing prehab for several months until she was finally able to undergo surgery on 12/14/17. She underwent an allograft and has Wbing precautions at 40%. She reports her pain as not been too bad, but she is noticing some swelling.      SUBJECTIVE:  Patient reports she is feeling a little better, but still having trouble with side to side movements.    OBJECTIVE:          AT EVAL       TODAY  Gait: Antalgic to L side, decreased weight shift, decreased heel to toe    Sensation: intact to light touch     Knee AROM:  Flexion      120      Extension    0  Knee PROM  Flexion      125      Extension    -2        Strength:  Quadriceps    4      Hamstrings    4     Gluteus Medius   3+      Gluteus Jaskaran   3+     Hip Flexors    4 (noted slight quad lag with SLR)            Function:  LEFS 55% disability on initial evaluation. Patient scored a 43.75% on re-evaluation on the LEFS.    Tenderness to palpation:  Slight tenderness to L distal incision    ASSESSMENT: Patient demonstrated good tolerance to there ex and is able to slowly get back to doing more side to side movements.    TREATMENT PROVIDED:  -Manual Therapy:  6 min  Patellar mobilizations  STM to posterior knee  -Therapeutic Exercise:  40 min  Scifit x 5 min  LE bike x 8 min  Standing balance on bosu ball eyes open  Standing gastroc stretch  Step ups  Step downs  Lateral lunges  Mini wall squats  SL stance with ball toss and on foam  - Gait: 5 min  Forward walking on treadmill 5 min with increased speed  -Modalities: 15 min ice + TENS to L knee with prolonged knee extension  -Education:  5 min: on condition and HEP    PLAN:  Patient will benefit from physical therapy (2-3) x/week for (8-12) weeks including manual therapy, therapeutic exercise, functional activities, modalities, and patient education.    Thank you for this referral.    These services are reasonable and necessary for the conditions set forth above while under my care.    Yudi Mercado, PT, DPT

## 2018-02-21 ENCOUNTER — CLINICAL SUPPORT (OUTPATIENT)
Dept: REHABILITATION | Facility: HOSPITAL | Age: 53
End: 2018-02-21
Payer: COMMERCIAL

## 2018-02-21 DIAGNOSIS — Z98.890 S/P ACL RECONSTRUCTION: Primary | ICD-10-CM

## 2018-02-21 PROCEDURE — 97014 ELECTRIC STIMULATION THERAPY: CPT

## 2018-02-21 PROCEDURE — 97110 THERAPEUTIC EXERCISES: CPT

## 2018-02-22 ENCOUNTER — CLINICAL SUPPORT (OUTPATIENT)
Dept: REHABILITATION | Facility: HOSPITAL | Age: 53
End: 2018-02-22
Payer: COMMERCIAL

## 2018-02-22 DIAGNOSIS — Z98.890 S/P ACL RECONSTRUCTION: Primary | ICD-10-CM

## 2018-02-22 PROCEDURE — 97110 THERAPEUTIC EXERCISES: CPT

## 2018-02-22 PROCEDURE — 97116 GAIT TRAINING THERAPY: CPT

## 2018-02-22 PROCEDURE — 97014 ELECTRIC STIMULATION THERAPY: CPT

## 2018-02-22 NOTE — PROGRESS NOTES
PHYSICAL THERAPY DAILY NOTE    Referring Provider:  Yeny Rahman    Diagnosis:       ICD-10-CM ICD-9-CM    1. S/P ACL reconstruction Z98.890 V45.89      Orders:  Evaluate and Treat    Date of Initial Evaluation: 12/21/17 (re-eval needed for 2/23/18)    Visit # 17     BACKGROUND:  Patient reports that approximately 1 year ago is when she initially tore her ACL. She had been performing prehab for several months until she was finally able to undergo surgery on 12/14/17. She underwent an allograft and has Wbing precautions at 40%. She reports her pain as not been too bad, but she is noticing some swelling.      SUBJECTIVE:  Patient reports no pain with walking, but still difficulty with lateral movements    OBJECTIVE:          AT EVAL       TODAY  Gait: Antalgic to L side, decreased weight shift, decreased heel to toe    Sensation: intact to light touch     Knee AROM:  Flexion      120      Extension    0  Knee PROM  Flexion      125      Extension    -2        Strength:  Quadriceps    4      Hamstrings    4     Gluteus Medius   3+      Gluteus Jaskaran   3+     Hip Flexors    4 (noted slight quad lag with SLR)            Function:  LEFS 55% disability on initial evaluation. Patient scored a 43.75% on re-evaluation on the LEFS.    Tenderness to palpation:  Slight tenderness to L distal incision    ASSESSMENT: Patient demonstrated improved tolerance to lateral lunges after STM to L posterior knee and improved ease with step downs this session.    TREATMENT PROVIDED:  -Manual Therapy:  6 min  Patellar mobilizations  STM to posterior knee  -Therapeutic Exercise:  40 min  Scifit x 5 min  LE bike x 8 min  Standing balance on bosu ball eyes open  Standing gastroc stretch  Step ups  Step downs  Lateral lunges  Mini wall squats  SL stance with ball toss and on foam  - Gait: 8 min  Forward walking on treadmill 5 min with increased speed  Retro walking  -Modalities: 15 min ice + TENS to L knee with prolonged knee  extension  -Education: 5 min: on condition and HEP    PLAN:  Patient will benefit from physical therapy (2-3) x/week for (8-12) weeks including manual therapy, therapeutic exercise, functional activities, modalities, and patient education.    Thank you for this referral.    These services are reasonable and necessary for the conditions set forth above while under my care.    Casey-Renata Mercado, PT, DPT

## 2018-02-23 NOTE — PROGRESS NOTES
PHYSICAL THERAPY RE-EVALUATION    Referring Provider:  Yeny Rahman    Diagnosis:       ICD-10-CM ICD-9-CM    1. S/P ACL reconstruction Z98.890 V45.89      Orders:  Evaluate and Treat    Date of Initial Evaluation: 12/21/17    Visit # 18    BACKGROUND:  Patient reports that approximately 1 year ago is when she initially tore her ACL. She had been performing prehab for several months until she was finally able to undergo surgery on 12/14/17. She underwent an allograft and has Wbing precautions at 40%. She reports her pain as not been too bad, but she is noticing some swelling.      SUBJECTIVE: Patient reports her knee has been feeling pretty good.    OBJECTIVE:          AT EVAL       TODAY  Gait: Antalgic to L side, decreased weight shift, decreased heel to toe    Sensation: intact to light touch     Knee AROM:  Flexion      WNL      Extension    WNL  Knee PROM  Flexion      WNL      Extension    WNL        Strength:  Quadriceps    4      Hamstrings    4+     Gluteus Medius   4      Gluteus Jaskaran   4     Hip Flexors    4             Function:  LEFS 55% disability on initial evaluation. Patient scored a 31.25% on re-evaluation on the LEFS.    Tenderness to palpation:  Slight tenderness to L distal incision    ASSESSMENT: Patient continues to demonstrate good progression with there ex and mobility with skilled services. Patient will continue to improve mobility, strength, and decrease pain in order to improve quality of life.    Short Term Goals:  1-4 weeks  1. I with HEP MET  2. Increase A/PROM from 0-90 degrees MET  3. Increase hip/knee strength 1/2 grade  MET  4. Pt demo decreased swelling at mid patella MET    Long Term Goals: 5-12 weeks  1.Ambulate with normalized gait pattern ALMOST MET  2. Hip/knee strength WNL prison MET  3. Knee ROM WNL MET  4. Decrease Pain to 0 with all activities NOT MET    TREATMENT PROVIDED:  -Manual Therapy:  None provided this session  -Therapeutic Exercise:  35 min  LE bike  x 5 min  Scifit x 5 min  Heels slides  Heel raises on foam with 3 second holds  Standing balance on bosu ball eyes closed  Standing gastroc stretch  Standing hip FL/AB/EX on foam  Total gym 3 bands partial squat  TKE against G TB  - Gait: 8 min  Forward walking on treadmill 5 min with increased speed  Retro walking 3 min  -Modalities: 15 min ice + TENS to L knee with prolonged knee extension  -Education: 5 min: on condition and HEP    PLAN:  Patient will benefit from physical therapy (2-3) x/week for (8-12) weeks including manual therapy, therapeutic exercise, functional activities, modalities, and patient education.    Thank you for this referral.    These services are reasonable and necessary for the conditions set forth above while under my care.    Casey-Renata Mercado, PT, DPT

## 2018-02-26 ENCOUNTER — CLINICAL SUPPORT (OUTPATIENT)
Dept: REHABILITATION | Facility: HOSPITAL | Age: 53
End: 2018-02-26
Payer: COMMERCIAL

## 2018-02-26 DIAGNOSIS — Z98.890 S/P ACL RECONSTRUCTION: Primary | ICD-10-CM

## 2018-02-26 PROCEDURE — 97014 ELECTRIC STIMULATION THERAPY: CPT

## 2018-02-26 PROCEDURE — 97140 MANUAL THERAPY 1/> REGIONS: CPT

## 2018-02-26 PROCEDURE — 97110 THERAPEUTIC EXERCISES: CPT

## 2018-02-27 NOTE — PROGRESS NOTES
PHYSICAL THERAPY DAILY NOTE    Referring Provider:  Yeny Rahman    Diagnosis:       ICD-10-CM ICD-9-CM    1. S/P ACL reconstruction Z98.890 V45.89      Orders:  Evaluate and Treat    Date of Initial Evaluation: 12/21/17    Visit # 19    BACKGROUND:  Patient reports that approximately 1 year ago is when she initially tore her ACL. She had been performing prehab for several months until she was finally able to undergo surgery on 12/14/17. She underwent an allograft and has Wbing precautions at 40%. She reports her pain as not been too bad, but she is noticing some swelling.      SUBJECTIVE: Patient reports her knee is feeling a little better with side to side movements.    OBJECTIVE:          AT EVAL       TODAY  Gait: Antalgic to L side, decreased weight shift, decreased heel to toe    Sensation: intact to light touch     Knee AROM:  Flexion      WNL      Extension    WNL  Knee PROM  Flexion      WNL      Extension    WNL        Strength:  Quadriceps    4      Hamstrings    4+     Gluteus Medius   4      Gluteus Jaskaran   4     Hip Flexors    4             Function:  LEFS 55% disability on initial evaluation. Patient scored a 31.25% on re-evaluation on the LEFS.    Tenderness to palpation:  Slight tenderness to L distal incision    ASSESSMENT: Patient demonstrated good tolerance to there ex without any increase in pain and improvement in side to side movements.    TREATMENT PROVIDED:  -Manual Therapy: 5 min  STM to posterior L knee  -Therapeutic Exercise:  35 min  LE bike x 8 min  Scifit x 5 min  Ball toss SL   SL balance on foam  Standing balance on bosu ball eyes closed  Standing gastroc stretch  Wall squats  - Gait: 8 min  Forward walking on treadmill 5 min with increased speed  Retro walking 3 min  -Modalities: 15 min ice + TENS to L knee with prolonged knee extension  -Education: 5 min: on condition and HEP    PLAN:  Patient will benefit from physical therapy (2-3) x/week for (8-12) weeks including  manual therapy, therapeutic exercise, functional activities, modalities, and patient education.    Thank you for this referral.    These services are reasonable and necessary for the conditions set forth above while under my care.    Yudi Mercado, PT, DPT

## 2018-02-28 ENCOUNTER — CLINICAL SUPPORT (OUTPATIENT)
Dept: REHABILITATION | Facility: HOSPITAL | Age: 53
End: 2018-02-28
Payer: COMMERCIAL

## 2018-02-28 DIAGNOSIS — Z98.890 S/P ACL RECONSTRUCTION: Primary | ICD-10-CM

## 2018-02-28 PROCEDURE — 97110 THERAPEUTIC EXERCISES: CPT

## 2018-02-28 PROCEDURE — 97014 ELECTRIC STIMULATION THERAPY: CPT

## 2018-03-05 NOTE — PROGRESS NOTES
PHYSICAL THERAPY DAILY NOTE    Referring Provider:  Yeny Rahman    Diagnosis:       ICD-10-CM ICD-9-CM    1. S/P ACL reconstruction Z98.890 V45.89      Orders:  Evaluate and Treat    Date of Initial Evaluation: 12/21/17 (re-eval needed for 3/26/18)    Visit # 20    BACKGROUND:  Patient reports that approximately 1 year ago is when she initially tore her ACL. She had been performing prehab for several months until she was finally able to undergo surgery on 12/14/17. She underwent an allograft and has Wbing precautions at 40%. She reports her pain as not been too bad, but she is noticing some swelling.      SUBJECTIVE: Patient reports she has been feeling good and has been taking the stairs more at work.    OBJECTIVE:          AT EVAL       TODAY  Gait: Antalgic to L side, decreased weight shift, decreased heel to toe    Sensation: intact to light touch     Knee AROM:  Flexion      WNL      Extension    WNL  Knee PROM  Flexion      WNL      Extension    WNL        Strength:  Quadriceps    4      Hamstrings    4+     Gluteus Medius   4      Gluteus Jaskaran   4     Hip Flexors    4             Function:  LEFS 55% disability on initial evaluation. Patient scored a 31.25% on re-evaluation on the LEFS.    Tenderness to palpation:  Slight tenderness to L distal incision    ASSESSMENT: Patient demonstrated good tolerance to new there ex.    TREATMENT PROVIDED:  -Manual Therapy: 3 min  STM to posterior L knee  -Therapeutic Exercise:  40 min  LE bike x 8 min  Side step ups  Sidestepping against TB  High knees/butt kicks/toe touches  Ball toss SL   SL balance on foam  Lateral lunges  Leg extension 10#  Leg press 50#  Standing gastroc stretch  Wall squats with ball  -Modalities: 15 min ice + TENS to L knee with prolonged knee extension  -Education: 5 min: on condition and HEP    PLAN:  Patient will benefit from physical therapy (2-3) x/week for (8-12) weeks including manual therapy, therapeutic exercise, functional  activities, modalities, and patient education.    Thank you for this referral.    These services are reasonable and necessary for the conditions set forth above while under my care.    Yudi Mercado, PT, DPT

## 2018-03-07 ENCOUNTER — CLINICAL SUPPORT (OUTPATIENT)
Dept: REHABILITATION | Facility: HOSPITAL | Age: 53
End: 2018-03-07
Payer: COMMERCIAL

## 2018-03-07 DIAGNOSIS — Z98.890 S/P ACL RECONSTRUCTION: Primary | ICD-10-CM

## 2018-03-07 PROCEDURE — 97014 ELECTRIC STIMULATION THERAPY: CPT

## 2018-03-07 PROCEDURE — 97110 THERAPEUTIC EXERCISES: CPT

## 2018-03-08 NOTE — PROGRESS NOTES
PHYSICAL THERAPY DAILY NOTE    Referring Provider:  Yeny Rahman    Diagnosis:       ICD-10-CM ICD-9-CM    1. S/P ACL reconstruction Z98.890 V45.89      Orders:  Evaluate and Treat    Date of Initial Evaluation: 12/21/17 (re-eval needed for 3/26/18)    Visit # 21    BACKGROUND:  Patient reports that approximately 1 year ago is when she initially tore her ACL. She had been performing prehab for several months until she was finally able to undergo surgery on 12/14/17. She underwent an allograft and has Wbing precautions at 40%. She reports her pain as not been too bad, but she is noticing some swelling.      SUBJECTIVE: Patient reports she has been feeling about the same.    OBJECTIVE:          AT EVAL       TODAY  Gait: Antalgic to L side, decreased weight shift, decreased heel to toe    Sensation: intact to light touch     Knee AROM:  Flexion      WNL      Extension    WNL  Knee PROM  Flexion      WNL      Extension    WNL        Strength:  Quadriceps    4      Hamstrings    4+     Gluteus Medius   4      Gluteus Jaskaran   4     Hip Flexors    4             Function:  LEFS 55% disability on initial evaluation. Patient scored a 31.25% on re-evaluation on the LEFS.    Tenderness to palpation:  Slight tenderness to L distal incision    ASSESSMENT: Patient demonstrated good tolerance to there ex with reports of fatigue at the end, but no pain.    TREATMENT PROVIDED:  -Manual Therapy: 5 min  STM to posterior L knee  -Therapeutic Exercise:  40 min  LE bike x 8 min  Side step ups  Sidestepping against TB  High knees/butt kicks/toe touches  Ball toss SL   SL balance on foam  Lateral lunges  Leg extension 15#  Leg press 50#  Standing gastroc stretch  Wall squats with ball  -Modalities: 15 min ice + TENS to L knee with prolonged knee extension  -Education: 5 min: on condition and HEP    PLAN:  Patient will benefit from physical therapy (2-3) x/week for (8-12) weeks including manual therapy, therapeutic exercise,  functional activities, modalities, and patient education.    Thank you for this referral.    These services are reasonable and necessary for the conditions set forth above while under my care.    Yudi Mercado, PT, DPT

## 2018-03-09 ENCOUNTER — CLINICAL SUPPORT (OUTPATIENT)
Dept: REHABILITATION | Facility: HOSPITAL | Age: 53
End: 2018-03-09
Payer: COMMERCIAL

## 2018-03-09 DIAGNOSIS — Z98.890 S/P ACL RECONSTRUCTION: Primary | ICD-10-CM

## 2018-03-09 PROCEDURE — 97110 THERAPEUTIC EXERCISES: CPT

## 2018-03-09 PROCEDURE — 97014 ELECTRIC STIMULATION THERAPY: CPT

## 2018-03-13 NOTE — PROGRESS NOTES
PHYSICAL THERAPY DAILY NOTE    Referring Provider:  Yeny Rahman    Diagnosis:       ICD-10-CM ICD-9-CM    1. S/P ACL reconstruction Z98.890 V45.89      Orders:  Evaluate and Treat    Date of Initial Evaluation: 12/21/17 (re-eval needed for 3/26/18)    Visit # 22    BACKGROUND:  Patient reports that approximately 1 year ago is when she initially tore her ACL. She had been performing prehab for several months until she was finally able to undergo surgery on 12/14/17. She underwent an allograft and has Wbing precautions at 40%. She reports her pain as not been too bad, but she is noticing some swelling.      SUBJECTIVE: Patient reports she has been doing good and the MD appointment went well.    OBJECTIVE:          AT EVAL       TODAY  Gait: Antalgic to L side, decreased weight shift, decreased heel to toe    Sensation: intact to light touch     Knee AROM:  Flexion      WNL      Extension    WNL  Knee PROM  Flexion      WNL      Extension    WNL        Strength:  Quadriceps    4      Hamstrings    4+     Gluteus Medius   4      Gluteus Jaskaran   4     Hip Flexors    4             Function:  LEFS 55% disability on initial evaluation. Patient scored a 31.25% on re-evaluation on the LEFS.    Tenderness to palpation:  Slight tenderness to L distal incision    ASSESSMENT: Patient demonstrated good tolerance to there ex with muscle fatigue reported, but no increase in pain.    TREATMENT PROVIDED:  -Manual Therapy: 5 min  STM to posterior L knee  -Therapeutic Exercise:  40 min  LE bike x 8 min  Side step ups  Sidestepping against TB  High knees/butt kicks/toe touches  Ball toss SL   SL balance on foam  Lateral lunges  Leg extension 15#  Leg press 50#  Standing gastroc stretch  Wall squats with ball  -Modalities: 15 min ice + TENS to L knee with prolonged knee extension  -Education: 5 min: on condition and HEP    PLAN:  Patient will benefit from physical therapy (2-3) x/week for (8-12) weeks including manual therapy,  therapeutic exercise, functional activities, modalities, and patient education.    Thank you for this referral.    These services are reasonable and necessary for the conditions set forth above while under my care.    Yudi Mercado, PT, DPT

## 2018-03-14 ENCOUNTER — OFFICE VISIT (OUTPATIENT)
Dept: ORTHOPEDICS | Facility: CLINIC | Age: 53
End: 2018-03-14
Payer: COMMERCIAL

## 2018-03-14 ENCOUNTER — CLINICAL SUPPORT (OUTPATIENT)
Dept: REHABILITATION | Facility: HOSPITAL | Age: 53
End: 2018-03-14
Payer: COMMERCIAL

## 2018-03-14 VITALS
SYSTOLIC BLOOD PRESSURE: 107 MMHG | HEART RATE: 76 BPM | RESPIRATION RATE: 12 BRPM | WEIGHT: 220.44 LBS | BODY MASS INDEX: 37.63 KG/M2 | DIASTOLIC BLOOD PRESSURE: 68 MMHG | HEIGHT: 64 IN

## 2018-03-14 DIAGNOSIS — Z09 POSTOP CHECK: ICD-10-CM

## 2018-03-14 DIAGNOSIS — Z98.890 S/P ACL RECONSTRUCTION: Primary | ICD-10-CM

## 2018-03-14 PROCEDURE — 99024 POSTOP FOLLOW-UP VISIT: CPT | Mod: S$GLB,,, | Performed by: PHYSICIAN ASSISTANT

## 2018-03-14 PROCEDURE — 97110 THERAPEUTIC EXERCISES: CPT

## 2018-03-14 PROCEDURE — 97014 ELECTRIC STIMULATION THERAPY: CPT

## 2018-03-14 PROCEDURE — 99999 PR PBB SHADOW E&M-EST. PATIENT-LVL III: CPT | Mod: PBBFAC,,, | Performed by: PHYSICIAN ASSISTANT

## 2018-03-14 NOTE — PROGRESS NOTES
Patient ID: Taryn Serrano is a 52 y.o. female.    Chief Complaint: Post-op Evaluation of the Left Knee      HPI: Taryn Serrano  is a 52 y.o. female who c/o Post-op Evaluation of the Left Knee   for duration of 3 months.  She had a left knee anterior cruciate ligament reconstruction by Dr. Daly on 17.  She comes in today for reevaluation.  She has been doing physical therapy and progressing well.  She tells me day-to-day she is doing great and improving significantly.  She has minimal pain.  It is 4 out of 10 at worst.  She points to her hamstrings posteriorly as to where the pain is located.  She is much better than she was prior to surgery and is happy that she went through with the surgery.  She has no complaints of instability quality is a tightness in the hamstrings.  Alleviating factors include therapy.  Aggravating factors include nothing.        Objective:                    Left Knee Exam     Inspection   Erythema: absent  Scars: present (well-healed.  No sign or symptom of infection.)  Swelling: absent  Effusion: absent  Deformity: deformity  Bruising: absent    Tenderness   The patient is experiencing no tenderness.         Range of Motion   Extension: normal   Flexion: normal Left knee flexion: improving.     Tests   Meniscus   Rosalia:  Medial - negative Lateral - negative  Stability Lachman: normal (-1 to 2mm) PCL-Posterior Drawer: normal (0 to 2mm)  MCL - Valgus: normal (0 to 2mm)  LCL - Varus: normal (0 to 2mm)  Patella   Patellar Grind: negative    Other   Muscle Tightness: hamstring tightness  Sensation: normal    Muscle Strength   Left Lower Extremity   Quadriceps:  4/5   Hamstrin/5     Vascular Exam       Edema  Left Lower Leg: absent              Assessment:       Encounter Diagnoses   Name Primary?    S/P ACL reconstruction Yes    Postop check           Plan:       Taryn was seen today for post-op evaluation.    Diagnoses and all orders for this visit:    S/P ACL  reconstruction    Postop check    Ms. Centeno comes in today for another postoperative appointment.  She is improving.  She will continue with physical therapy until they discharge her to a home program.  Since she should not be doing any heavy lifting or heavy activity at this point.  She can gradually increase her activity level.  I'll see her back in the office for one last postoperative check at the 6 month rajeev.  If she has any problems or concerns before then, she will notify the office.  She verbalizes understanding and agrees with the above plan.  Follow-up in about 3 months (around 6/14/2018).          The patient understands, chooses and consents to this plan and accepts all   the risks which include but are not limited to the risks mentioned above.     Disclaimer: This note was prepared using a voice recognition system and is likely to have sound alike errors within the text.

## 2018-03-16 NOTE — PROGRESS NOTES
PHYSICAL THERAPY DAILY NOTE    Referring Provider:  Yeny Rahman    Diagnosis:       ICD-10-CM ICD-9-CM    1. S/P ACL reconstruction Z98.890 V45.89      Orders:  Evaluate and Treat    Date of Initial Evaluation: 12/21/17 (re-eval needed for 3/26/18)    Visit # 23    BACKGROUND:  Patient reports that approximately 1 year ago is when she initially tore her ACL. She had been performing prehab for several months until she was finally able to undergo surgery on 12/14/17. She underwent an allograft and has Wbing precautions at 40%. She reports her pain as not been too bad, but she is noticing some swelling.      SUBJECTIVE: Patient reports her knee feeling about the same.    OBJECTIVE:          AT EVAL       TODAY  Gait: Antalgic to L side, decreased weight shift, decreased heel to toe    Sensation: intact to light touch     Knee AROM:  Flexion      WNL      Extension    WNL  Knee PROM  Flexion      WNL      Extension    WNL        Strength:  Quadriceps    4      Hamstrings    4+     Gluteus Medius   4      Gluteus Jaskaran   4     Hip Flexors    4             Function:  LEFS 55% disability on initial evaluation. Patient scored a 31.25% on re-evaluation on the LEFS.    Tenderness to palpation:  Slight tenderness to L distal incision    ASSESSMENT:  Patient demonstrated good tolerance to there ex with  Muscle fatigue, but no increase in pain.    TREATMENT PROVIDED:  -Manual Therapy: 5 min  STM to posterior L knee  -Therapeutic Exercise:  40 min  LE bike x 8 min  Side step ups  Sidestepping against TB  High knees/butt kicks/toe touches  Ball toss SL   SL balance on foam  Lateral lunges  Leg extension 15#  Leg press 55#  Standing gastroc stretch  Wall squats with ball  -Modalities: 15 min ice + TENS to L knee with prolonged knee extension  -Education: 5 min: on condition and HEP    PLAN:  Patient will benefit from physical therapy (2-3) x/week for (8-12) weeks including manual therapy, therapeutic exercise,  functional activities, modalities, and patient education.    Thank you for this referral.    These services are reasonable and necessary for the conditions set forth above while under my care.    Yudi Mercado, PT, DPT

## 2018-03-19 ENCOUNTER — CLINICAL SUPPORT (OUTPATIENT)
Dept: REHABILITATION | Facility: HOSPITAL | Age: 53
End: 2018-03-19
Payer: COMMERCIAL

## 2018-03-19 DIAGNOSIS — Z98.890 S/P ACL RECONSTRUCTION: Primary | ICD-10-CM

## 2018-03-19 PROCEDURE — 97014 ELECTRIC STIMULATION THERAPY: CPT

## 2018-03-19 PROCEDURE — 97110 THERAPEUTIC EXERCISES: CPT

## 2018-03-21 NOTE — PROGRESS NOTES
PHYSICAL THERAPY DAILY NOTE    Referring Provider:  Yeny Rahman    Diagnosis:       ICD-10-CM ICD-9-CM    1. S/P ACL reconstruction Z98.890 V45.89      Orders:  Evaluate and Treat    Date of Initial Evaluation: 12/21/17 (re-eval needed for 3/26/18)    Visit # 24    BACKGROUND:  Patient reports that approximately 1 year ago is when she initially tore her ACL. She had been performing prehab for several months until she was finally able to undergo surgery on 12/14/17. She underwent an allograft and has Wbing precautions at 40%. She reports her pain as not been too bad, but she is noticing some swelling.      SUBJECTIVE: Patient reports having difficulty with descending stairs.    OBJECTIVE:          AT EVAL       TODAY  Gait: Antalgic to L side, decreased weight shift, decreased heel to toe    Sensation: intact to light touch     Knee AROM:  Flexion      WNL      Extension    WNL  Knee PROM  Flexion      WNL      Extension    WNL        Strength:  Quadriceps    4      Hamstrings    4+     Gluteus Medius   4      Gluteus Jaskaran   4     Hip Flexors    4             Function:  LEFS 55% disability on initial evaluation. Patient scored a 31.25% on re-evaluation on the LEFS.    Tenderness to palpation:  Slight tenderness to L distal incision    ASSESSMENT:  Patient demonstrated good tolerance to increased weight with machines and several minutes spent on proper ankle position to assist with single leg balance.    TREATMENT PROVIDED:  -Manual Therapy: 5 min  STM to posterior L knee  -Therapeutic Exercise:  40 min  LE bike x 8 min  Side step ups  Sidestepping against TB  High knees/butt kicks/toe touches  Ball toss SL   Lateral lunges  Leg extension 15#  Leg press 55#  Standing gastroc stretch  Wall squats with ball  Ankle arch strengthening  -Modalities: 15 min ice + TENS to L knee with prolonged knee extension  -Education: 5 min: on condition and HEP    PLAN:  Patient will benefit from physical therapy (2-3)  x/week for (8-12) weeks including manual therapy, therapeutic exercise, functional activities, modalities, and patient education.    Thank you for this referral.    These services are reasonable and necessary for the conditions set forth above while under my care.    Yudi Mercado, PT, DPT

## 2018-03-22 ENCOUNTER — CLINICAL SUPPORT (OUTPATIENT)
Dept: REHABILITATION | Facility: HOSPITAL | Age: 53
End: 2018-03-22
Payer: COMMERCIAL

## 2018-03-22 DIAGNOSIS — Z98.890 S/P ACL RECONSTRUCTION: Primary | ICD-10-CM

## 2018-03-22 PROCEDURE — 97014 ELECTRIC STIMULATION THERAPY: CPT

## 2018-03-22 PROCEDURE — 97110 THERAPEUTIC EXERCISES: CPT

## 2018-03-23 ENCOUNTER — PATIENT MESSAGE (OUTPATIENT)
Dept: INTERNAL MEDICINE | Facility: CLINIC | Age: 53
End: 2018-03-23

## 2018-03-26 ENCOUNTER — HOSPITAL ENCOUNTER (OUTPATIENT)
Dept: RADIOLOGY | Facility: HOSPITAL | Age: 53
Discharge: HOME | End: 2018-03-26
Attending: INTERNAL MEDICINE
Payer: COMMERCIAL

## 2018-03-26 VITALS — WEIGHT: 220 LBS | HEIGHT: 64 IN | BODY MASS INDEX: 37.56 KG/M2

## 2018-03-26 DIAGNOSIS — Z00.00 ENCOUNTER FOR PREVENTIVE HEALTH EXAMINATION: ICD-10-CM

## 2018-03-26 PROCEDURE — 77067 SCR MAMMO BI INCL CAD: CPT | Mod: 26,,, | Performed by: RADIOLOGY

## 2018-03-26 PROCEDURE — 77067 SCR MAMMO BI INCL CAD: CPT | Mod: TC,PO

## 2018-03-26 NOTE — PROGRESS NOTES
PHYSICAL THERAPY RE-EVALUATION    Referring Provider:  Yeny Rahman    Diagnosis:       ICD-10-CM ICD-9-CM    1. S/P ACL reconstruction Z98.890 V45.89      Orders:  Evaluate and Treat    Date of Initial Evaluation: 12/21/17    Visit # 25    BACKGROUND:  Patient reports that approximately 1 year ago is when she initially tore her ACL. She had been performing prehab for several months until she was finally able to undergo surgery on 12/14/17. She underwent an allograft and has Wbing precautions at 40%. She reports her pain as not been too bad, but she is noticing some swelling.      SUBJECTIVE: Patient reports this is the first week her knee has not hurt at all!    OBJECTIVE:          AT EVAL       TODAY  Gait: Antalgic to L side, decreased weight shift, decreased heel to toe    Sensation: intact to light touch     Knee AROM:  Flexion      WNL      Extension    WNL  Knee PROM  Flexion      WNL      Extension    WNL        Strength:  Quadriceps    4      Hamstrings    4+     Gluteus Medius   4      Gluteus Jaskaran   4     Hip Flexors    4             Function:  LEFS 55% disability on initial evaluation. Patient scored a 23.75% on re-evaluation on the LEFS on re-evaluation.    Tenderness to palpation:  Slight tenderness to L distal incision    ASSESSMENT: Patient continues to demonstrated good tolerance to high level there ex without any increase in pain and improved stability with certain exercises.    Short Term Goals:  1-4 weeks  1. I with HEP MET  2. Increase A/PROM from 0-90 degrees MET  3. Increase hip/knee strength 1/2 grade  MET  4. Pt demo decreased swelling at mid patella MET    Long Term Goals: 5-12 weeks  1.Ambulate with normalized gait pattern ALMOST MET  2. Hip/knee strength WNL retirement MET  3. Knee ROM WNL MET  4. Decrease Pain to 0 with all activities NOT MET    TREATMENT PROVIDED:  -Manual Therapy: 5 min  STM to posterior L knee  -Therapeutic Exercise:  40 min  LE bike x 8 min  Side step  ups  Sidestepping against TB  High knees/butt kicks/toe touches  Ball toss SL   Lateral lunges  Leg extension 15#  Leg press 55#  Standing gastroc stretch  Wall squats with ball  Ankle arch strengthening  -Modalities: 15 min ice + TENS to L knee with prolonged knee extension  -Education: 5 min: on condition and HEP    PLAN:  Patient will benefit from physical therapy (2-3) x/week for (8-12) weeks including manual therapy, therapeutic exercise, functional activities, modalities, and patient education.    Thank you for this referral.    These services are reasonable and necessary for the conditions set forth above while under my care.    Yudi Mercado, PT, DPT

## 2018-03-28 ENCOUNTER — CLINICAL SUPPORT (OUTPATIENT)
Dept: REHABILITATION | Facility: HOSPITAL | Age: 53
End: 2018-03-28
Payer: COMMERCIAL

## 2018-03-28 DIAGNOSIS — Z98.890 S/P ACL RECONSTRUCTION: Primary | ICD-10-CM

## 2018-03-28 PROCEDURE — 97014 ELECTRIC STIMULATION THERAPY: CPT

## 2018-03-28 PROCEDURE — 97110 THERAPEUTIC EXERCISES: CPT

## 2018-04-03 NOTE — PROGRESS NOTES
PHYSICAL THERAPY RE-EVALUATION    Referring Provider:  Yeny Rahman    Diagnosis:       ICD-10-CM ICD-9-CM    1. S/P ACL reconstruction Z98.890 V45.89      Orders:  Evaluate and Treat    Date of Initial Evaluation: 12/21/17 (re-eval needed for 4/28/18)    Visit # 26    BACKGROUND:  Patient reports that approximately 1 year ago is when she initially tore her ACL. She had been performing prehab for several months until she was finally able to undergo surgery on 12/14/17. She underwent an allograft and has Wbing precautions at 40%. She reports her pain as not been too bad, but she is noticing some swelling.      SUBJECTIVE: Patient reports still having difficulty going down the stairs.    OBJECTIVE:          AT EVAL       TODAY  Gait: Antalgic to L side, decreased weight shift, decreased heel to toe    Sensation: intact to light touch     Knee AROM:  Flexion      WNL      Extension    WNL  Knee PROM  Flexion      WNL      Extension    WNL        Strength:  Quadriceps    4      Hamstrings    4+     Gluteus Medius   4      Gluteus Jaskaran   4     Hip Flexors    4             Function:  LEFS 55% disability on initial evaluation. Patient scored a 23.75% on re-evaluation on the LEFS on re-evaluation.    Tenderness to palpation:  Slight tenderness to L distal incision    ASSESSMENT: Patient demonstrated good tolerance to therex without any increase in pain and noted improvement in muscle extensibility to L posterior knee.    TREATMENT PROVIDED:  -Manual Therapy: 3 min  STM to posterior L knee  Patellar mobilizations  -Therapeutic Exercise:  35 min  LE bike x 8 min  Side step ups  Sidestepping against TB  High knees/butt kicks/toe touches  Ball toss SL   Lateral lunges  Leg extension 15#  Leg press 55#  Standing gastroc stretch  Wall squats with ball  Ankle arch strengthening  -Modalities: 15 min ice + TENS to L knee with prolonged knee extension  -Education: 5 min: on condition and HEP    PLAN:  Patient will benefit  from physical therapy (2-3) x/week for (8-12) weeks including manual therapy, therapeutic exercise, functional activities, modalities, and patient education.    Thank you for this referral.    These services are reasonable and necessary for the conditions set forth above while under my care.    Yudi Mercado, PT, DPT

## 2018-04-04 ENCOUNTER — CLINICAL SUPPORT (OUTPATIENT)
Dept: REHABILITATION | Facility: HOSPITAL | Age: 53
End: 2018-04-04
Payer: COMMERCIAL

## 2018-04-04 DIAGNOSIS — Z98.890 S/P ACL RECONSTRUCTION: Primary | ICD-10-CM

## 2018-04-04 PROCEDURE — 97110 THERAPEUTIC EXERCISES: CPT

## 2018-04-04 PROCEDURE — 97014 ELECTRIC STIMULATION THERAPY: CPT

## 2018-04-11 ENCOUNTER — OFFICE VISIT (OUTPATIENT)
Dept: OBSTETRICS AND GYNECOLOGY | Facility: CLINIC | Age: 53
End: 2018-04-11
Payer: COMMERCIAL

## 2018-04-11 ENCOUNTER — CLINICAL SUPPORT (OUTPATIENT)
Dept: REHABILITATION | Facility: HOSPITAL | Age: 53
End: 2018-04-11
Payer: COMMERCIAL

## 2018-04-11 VITALS
HEIGHT: 64 IN | SYSTOLIC BLOOD PRESSURE: 110 MMHG | WEIGHT: 223.56 LBS | DIASTOLIC BLOOD PRESSURE: 70 MMHG | BODY MASS INDEX: 38.17 KG/M2

## 2018-04-11 DIAGNOSIS — Z01.419 ENCOUNTER FOR GYNECOLOGICAL EXAMINATION WITHOUT ABNORMAL FINDING: Primary | ICD-10-CM

## 2018-04-11 DIAGNOSIS — Z98.890 S/P ACL RECONSTRUCTION: Primary | ICD-10-CM

## 2018-04-11 PROCEDURE — 97140 MANUAL THERAPY 1/> REGIONS: CPT

## 2018-04-11 PROCEDURE — 97014 ELECTRIC STIMULATION THERAPY: CPT

## 2018-04-11 PROCEDURE — 99396 PREV VISIT EST AGE 40-64: CPT | Mod: S$GLB,,, | Performed by: OBSTETRICS & GYNECOLOGY

## 2018-04-11 PROCEDURE — 99999 PR PBB SHADOW E&M-EST. PATIENT-LVL III: CPT | Mod: PBBFAC,,, | Performed by: OBSTETRICS & GYNECOLOGY

## 2018-04-11 PROCEDURE — 97110 THERAPEUTIC EXERCISES: CPT

## 2018-04-11 NOTE — LETTER
April 11, 2018      Alyce Schroeder MD  [address] [place] [address]           [facility]  [address] [place] [address]  Phone: [phone]  Fax: [phone]          Patient: Taryn Serrano   MR Number: [id number]   YOB: 1965   Date of Visit: 4/11/2018       Dear Dr. Alyce Schroeder:    Thank you for referring Taryn Serrano to me for evaluation. Attached you will find relevant portions of my assessment and plan of care.    If you have questions, please do not hesitate to call me. I look forward to following Taryn Serrano along with you.    Sincerely,    Gita Alfonso MD    Enclosure  CC:  No Recipients    If you would like to receive this communication electronically, please contact [email or web address] or [phone] to request more information on [employer] access.    For providers and/or their staff who would like to refer a patient to [facility], please contact us through our one-stop-shop provider referral line, [facility], at [phone].    If you feel you have received this communication in error or would no longer like to receive these types of communications, please e-mail [email or web address]

## 2018-04-11 NOTE — PROGRESS NOTES
CC: Well woman exam    Taryn Serrano is a 52 y.o. female  presents for a well woman exam.  LMP: Patient's last menstrual period was 2015..  No issues, problems, or complaints.    Past Medical History:   Diagnosis Date    Anxiety and depression     Asthma, exercise induced     Dry eyes     History of ADHD     Hypothyroid     URBAN (obstructive sleep apnea)     Vitamin D deficiency      Past Surgical History:   Procedure Laterality Date    ANKLE SURGERY Right     CARPAL TUNNEL RELEASE Bilateral     CATARACT EXTRACTION BILATERAL W/ ANTERIOR VITRECTOMY      CATARACT EXTRACTION W/  INTRAOCULAR LENS IMPLANT  OU    KNEE SURGERY      right     Social History     Social History    Marital status:      Spouse name: N/A    Number of children: 2    Years of education: N/A     Occupational History     [employer]     Social History Main Topics    Smoking status: Never Smoker    Smokeless tobacco: Never Used    Alcohol use Yes      Comment: social    Drug use: No    Sexual activity: Not Currently     Other Topics Concern    Are You Pregnant Or Think You May Be? No    Breast-Feeding No     Social History Narrative    Full time emplyed.     Family History   Problem Relation Age of Onset    Cataracts Father     Alzheimer's disease Father     Colon cancer Father     Heart failure Mother     Melanoma Neg Hx     Psoriasis Neg Hx     Lupus Neg Hx     Eczema Neg Hx     Acne Neg Hx     Breast cancer Neg Hx     Ovarian cancer Neg Hx     Thrombophilia Neg Hx      OB History      Para Term  AB Living    2 2 2     2    SAB TAB Ectopic Multiple Live Births                       Current Outpatient Prescriptions:     albuterol (VENTOLIN HFA) 90 mcg/actuation inhaler, Inhale 1-2 puffs into the lungs every 6 (six) hours as needed for Wheezing., Disp: 18 g, Rfl: 0    buPROPion (WELLBUTRIN XL) 150 MG TB24 tablet, TAKE 1 TABLET (150 MG TOTAL) BY MOUTH ONCE DAILY., Disp:  "30 tablet, Rfl: 9    cholecalciferol, vitamin D3, 2,000 unit Cap, Take 1 capsule (2,000 Units total) by mouth once daily., Disp: 100 capsule, Rfl: 6    FLUoxetine (PROZAC) 40 MG capsule, TAKE 2 CAPSULES (80 MG TOTAL) BY MOUTH ONCE DAILY., Disp: 60 capsule, Rfl: 6    fluticasone (FLONASE) 50 mcg/actuation nasal spray, 2 sprays by Each Nare route once daily., Disp: 16 g, Rfl: 6    fluticasone-salmeterol 500-50 mcg/dose (ADVAIR DISKUS) 500-50 mcg/dose DsDv diskus inhaler, Inhale 1 puff into the lungs 2 (two) times daily., Disp: 60 each, Rfl: 6    levothyroxine (SYNTHROID) 112 MCG tablet, TAKE 1 TABLET (112 MCG TOTAL) BY MOUTH ONCE DAILY., Disp: 30 tablet, Rfl: 11    meloxicam (MOBIC) 15 MG tablet, Take 1 tablet (15 mg total) by mouth once daily. Take with foodl, Disp: 30 tablet, Rfl: 0  No current facility-administered medications for this visit.     Facility-Administered Medications Ordered in Other Visits:     ondansetron HCl (PF) 4 mg/2 mL injection, , , PRN, Laci Smalls CRNA, 4 mg at 07/07/15 0846    GYNECOLOGY HISTORY:  No abnormal pap/std    DATA REVIEWED:  Last pap: normal Date: 2017  Last mmg: normal Date: 2018  Last colonoscopy: normal Date: 2012  Last DEXA: osteopenia Date: 1/2016    /70   Ht 5' 4" (1.626 m)   Wt 101.4 kg (223 lb 8.7 oz)   LMP 01/07/2015   BMI 38.37 kg/m²     ROS:  GENERAL: Denies weight gain or weight loss. Feeling well overall.   SKIN: Denies rash or lesions.   HEAD: Denies head injury or headache.   NODES: Denies enlarged lymph nodes.   CHEST: Denies chest pain or shortness of breath.   CARDIOVASCULAR: Denies palpitations or left sided chest pain.   ABDOMEN: No abdominal pain, constipation, diarrhea, nausea, vomiting or rectal bleeding.   URINARY: No frequency, dysuria, hematuria, or burning on urination.  REPRODUCTIVE: See HPI.   BREASTS: The patient denies pain, lumps, or nipple discharge.   HEMATOLOGIC: No easy bruisability or excessive bleeding.   MUSCULOSKELETAL: " Denies joint pain or swelling.   NEUROLOGIC: Denies syncope or weakness.   PSYCHIATRIC: Denies depression, anxiety or mood swings.    PHYSICAL EXAM:    APPEARANCE: Well nourished, well developed, in no acute distress.  AFFECT: WNL, alert and oriented x 3  SKIN: No acne or hirsutism  NECK: Neck symmetric without masses or thyromegaly  NODES: No inguinal, cervical, axillary, or femoral lymph node enlargement  CHEST: Good respiratory effect  ABDOMEN: Soft.  No tenderness or masses.  No hepatosplenomegaly.  No hernias.  BREASTS: Symmetrical, no skin changes or visible lesions.  No palpable masses, nipple discharge bilaterally.  PELVIC: Normal external genitalia without lesions.  Normal hair distribution.  Adequate perineal body, normal urethral meatus.  Vagina atrophic without lesions or discharge.  Cervix pink, without lesions, discharge or tenderness.  No significant cystocele or rectocele.  Bimanual exam shows uterus to be normal size, regular, mobile and nontender.  Adnexa without masses or tenderness.   EXTREMITIES: No edema.    Encounter for gynecological examination without abnormal finding        Patient was counseled today on A.C.S. Pap guidelines (q3) and recommendations for yearly pelvic exams, yearly mammograms starting age 40, and clinical breast exams; to see her PCP for other health maintenance.

## 2018-04-18 ENCOUNTER — CLINICAL SUPPORT (OUTPATIENT)
Dept: REHABILITATION | Facility: HOSPITAL | Age: 53
End: 2018-04-18
Payer: COMMERCIAL

## 2018-04-18 DIAGNOSIS — Z98.890 S/P ACL RECONSTRUCTION: Primary | ICD-10-CM

## 2018-04-18 PROCEDURE — 97110 THERAPEUTIC EXERCISES: CPT

## 2018-04-18 PROCEDURE — 97140 MANUAL THERAPY 1/> REGIONS: CPT

## 2018-04-18 PROCEDURE — 97014 ELECTRIC STIMULATION THERAPY: CPT

## 2018-05-01 NOTE — PROGRESS NOTES
PHYSICAL THERAPY RE-EVALUATION    Referring Provider:  Yeny Rahman    Diagnosis:       ICD-10-CM ICD-9-CM    1. S/P ACL reconstruction Z98.890 V45.89      Orders:  Evaluate and Treat    Date of Initial Evaluation: 12/21/17 (re-eval needed for 6/2/18)    Visit # 29    BACKGROUND:  Patient reports that approximately 1 year ago is when she initially tore her ACL. She had been performing prehab for several months until she was finally able to undergo surgery on 12/14/17. She underwent an allograft and has Wbing precautions at 40%. She reports her pain as not been too bad, but she is noticing some swelling.      SUBJECTIVE: Patient reports being able to perform more tasks than before, but is still having some stiffness after sitting for long periods.    OBJECTIVE:          AT EVAL       TODAY  Gait: Antalgic to L side, decreased weight shift, decreased heel to toe    Sensation: intact to light touch     Knee AROM:  Flexion      WNL      Extension    WNL  Knee PROM  Flexion      WNL      Extension    WNL        Strength:  Quadriceps    4      Hamstrings    4+     Gluteus Medius   4+      Gluteus Jaskaran   4     Hip Flexors    4             Function:  LEFS 55% disability on initial evaluation. Patient scored a 23.75% on re-evaluation on the LEFS on re-evaluation on 5/2/18..    Tenderness to palpation:  Slight tenderness to L distal incision    ASSESSMENT: Patient demonstrates improvement in quality of gait, tolerance to higher level there ex, and less pain with day to day activities. Patient needs continued skilled services in order to improve tolerance to all activity, improve strength, and decrease pain with all activities in order to improve quality of life.    Short Term Goals:  1-4 weeks  1. I with HEP MET  2. Increase A/PROM from 0-90 degrees MET  3. Increase hip/knee strength 1/2 grade  MET  4. Pt demo decreased swelling at mid patella MET    Long Term Goals: 5-12 weeks  1.Ambulate with normalized gait  pattern  MET  2. Hip/knee strength WNL care home MET  3. Knee ROM WNL MET  4. Decrease Pain to 0 with all activities care home MET    TREATMENT PROVIDED:  -Manual Therapy: 3 min  STM to posterior L knee  -Therapeutic Exercise:  35 min  Upright bike x 8 min  Squats with 10# kettlebell  Sidestepping against TB  High knees/butt kicks/toe touches  Split squats  Leg extension 15#  Leg press 55#  SL sit<>stand  Standing gastroc stretch  Elliptical 3 min  -Modalities: 15 min ice + TENS to L knee with prolonged knee extension  -Education: 5 min: on condition and HEP    PLAN:  Patient will benefit from physical therapy (1-2) x/week for (8-12) weeks including manual therapy, therapeutic exercise, functional activities, modalities, and patient education.    Thank you for this referral.    These services are reasonable and necessary for the conditions set forth above while under my care.    Yudi Mercado, PT, DPT

## 2018-05-02 ENCOUNTER — CLINICAL SUPPORT (OUTPATIENT)
Dept: REHABILITATION | Facility: HOSPITAL | Age: 53
End: 2018-05-02
Payer: COMMERCIAL

## 2018-05-02 DIAGNOSIS — Z98.890 S/P ACL RECONSTRUCTION: Primary | ICD-10-CM

## 2018-05-02 PROCEDURE — 97110 THERAPEUTIC EXERCISES: CPT

## 2018-05-02 PROCEDURE — 97014 ELECTRIC STIMULATION THERAPY: CPT

## 2018-05-07 NOTE — PROGRESS NOTES
PHYSICAL THERAPY DAILY NOTE    Referring Provider:  Yeny Rahman    Diagnosis:       ICD-10-CM ICD-9-CM    1. S/P ACL reconstruction Z98.890 V45.89      Orders:  Evaluate and Treat    Date of Initial Evaluation: 12/21/17 (re-eval needed for 6/2/18)    Visit # 30    BACKGROUND:  Patient reports that approximately 1 year ago is when she initially tore her ACL. She had been performing prehab for several months until she was finally able to undergo surgery on 12/14/17. She underwent an allograft and has Wbing precautions at 40%. She reports her pain as not been too bad, but she is noticing some swelling.      SUBJECTIVE: Patient reports increased knee pain since last session.    OBJECTIVE:          AT EVAL       TODAY  Gait: Antalgic to L side, decreased weight shift, decreased heel to toe    Sensation: intact to light touch     Knee AROM:  Flexion      WNL      Extension    WNL  Knee PROM  Flexion      WNL      Extension    WNL        Strength:  Quadriceps    4      Hamstrings    4+     Gluteus Medius   4+      Gluteus Jaskaran   4     Hip Flexors    4             Function:  LEFS 55% disability on initial evaluation. Patient scored a 23.75% on re-evaluation on the LEFS on re-evaluation on 5/2/18..    Tenderness to palpation:  Slight tenderness to L distal incision    ASSESSMENT: Patient reports having increased L medial knee pain since last session and unsure if due to PT, increased working out on own, or other activities. Patient demonstrated good tolerance to there ex today and did not perform split squats to determine if this was what aggravated patient's symptoms.    TREATMENT PROVIDED:  -Manual Therapy: none provided this session  -Therapeutic Exercise:  45 min  Upright bike x 5 min  Squats with 10# kettlebell  Sidestepping against TB  High knees/butt kicks/toe touches  Hamstring machine 25#  Leg extension 10#  Leg press 55#  SL sit<>stand  Standing gastroc stretch  Elliptical 5 min  -Modalities: 15 min  ice + TENS to L knee with prolonged knee extension  -Education: 5 min: on condition and HEP    PLAN:  Patient will benefit from physical therapy (1-2) x/week for (8-12) weeks including manual therapy, therapeutic exercise, functional activities, modalities, and patient education.    Thank you for this referral.    These services are reasonable and necessary for the conditions set forth above while under my care.    Yudi Mercado, PT, DPT

## 2018-05-09 ENCOUNTER — CLINICAL SUPPORT (OUTPATIENT)
Dept: REHABILITATION | Facility: HOSPITAL | Age: 53
End: 2018-05-09
Payer: COMMERCIAL

## 2018-05-09 DIAGNOSIS — Z98.890 S/P ACL RECONSTRUCTION: Primary | ICD-10-CM

## 2018-05-09 PROCEDURE — 97014 ELECTRIC STIMULATION THERAPY: CPT

## 2018-05-09 PROCEDURE — 97110 THERAPEUTIC EXERCISES: CPT

## 2018-05-15 NOTE — PROGRESS NOTES
PHYSICAL THERAPY DAILY NOTE    Referring Provider:  Yeny Rahman    Diagnosis:       ICD-10-CM ICD-9-CM    1. S/P ACL reconstruction Z98.890 V45.89      Orders:  Evaluate and Treat    Date of Initial Evaluation: 12/21/17 (re-eval needed for 6/2/18)    Visit # 31    BACKGROUND:  Patient reports that approximately 1 year ago is when she initially tore her ACL. She had been performing prehab for several months until she was finally able to undergo surgery on 12/14/17. She underwent an allograft and has Wbing precautions at 40%. She reports her pain as not been too bad, but she is noticing some swelling.      SUBJECTIVE: Patient reports she is still having medial knee pain that she feels mainly when descending stairs.     OBJECTIVE:          AT EVAL       TODAY  Gait: Antalgic to L side, decreased weight shift, decreased heel to toe    Sensation: intact to light touch     Knee AROM:  Flexion      WNL      Extension    WNL  Knee PROM  Flexion      WNL      Extension    WNL        Strength:  Quadriceps    4      Hamstrings    4+     Gluteus Medius   4+      Gluteus Jaskaran   4     Hip Flexors    4             Function:  LEFS 55% disability on initial evaluation. Patient scored a 23.75% on re-evaluation on the LEFS on re-evaluation on 5/2/18..    Tenderness to palpation:  Slight tenderness to L distal incision    ASSESSMENT: Patient demonstrated good tolerance to there ex without any increase in pain except for descending stairs. Patient still having medial knee pain with descending stairs and educated on the importance of working out 3x a week and working on step downs 1 x day or every other day.    TREATMENT PROVIDED:  -Manual Therapy: none provided this session  -Therapeutic Exercise:  45 min  Total gym 4 bands 5 min  Squats with 10# kettlebell  Sidestepping against TB  High knees/butt kicks/toe touches  Hamstring machine 25#  Leg extension 10#  Leg press 55#  SL sit<>stand  Standing gastroc stretch  Step  downs  -Modalities: 15 min ice + TENS to L knee with prolonged knee extension  -Education: 5 min: on condition and HEP    PLAN:  Patient will benefit from physical therapy (1-2) x/week for (8-12) weeks including manual therapy, therapeutic exercise, functional activities, modalities, and patient education.    Thank you for this referral.    These services are reasonable and necessary for the conditions set forth above while under my care.    Yudi Mercado, PT, DPT

## 2018-05-16 ENCOUNTER — CLINICAL SUPPORT (OUTPATIENT)
Dept: REHABILITATION | Facility: HOSPITAL | Age: 53
End: 2018-05-16
Payer: COMMERCIAL

## 2018-05-16 DIAGNOSIS — Z98.890 S/P ACL RECONSTRUCTION: Primary | ICD-10-CM

## 2018-05-16 PROCEDURE — 97110 THERAPEUTIC EXERCISES: CPT

## 2018-05-16 PROCEDURE — 97014 ELECTRIC STIMULATION THERAPY: CPT

## 2018-05-18 DIAGNOSIS — F41.9 ANXIETY AND DEPRESSION: ICD-10-CM

## 2018-05-18 DIAGNOSIS — F32.A ANXIETY AND DEPRESSION: ICD-10-CM

## 2018-05-18 RX ORDER — BUPROPION HYDROCHLORIDE 150 MG/1
TABLET ORAL
Qty: 30 TABLET | Refills: 9 | Status: SHIPPED | OUTPATIENT
Start: 2018-05-18 | End: 2019-04-02 | Stop reason: SDUPTHER

## 2018-05-22 NOTE — PROGRESS NOTES
PHYSICAL THERAPY DAILY NOTE    Referring Provider:  Yeny Rahman    Diagnosis:       ICD-10-CM ICD-9-CM    1. S/P ACL reconstruction Z98.890 V45.89      Orders:  Evaluate and Treat    Date of Initial Evaluation: 12/21/17 (re-eval needed for 6/2/18)    Visit # 32    BACKGROUND:  Patient reports that approximately 1 year ago is when she initially tore her ACL. She had been performing prehab for several months until she was finally able to undergo surgery on 12/14/17. She underwent an allograft and has Wbing precautions at 40%. She reports her pain as not been too bad, but she is noticing some swelling.      SUBJECTIVE: Patient reports that medial knee pain has improved a little bit since last session.    OBJECTIVE:          AT EVAL       TODAY  Gait: Antalgic to L side, decreased weight shift, decreased heel to toe    Sensation: intact to light touch     Knee AROM:  Flexion      WNL      Extension    WNL  Knee PROM  Flexion      WNL      Extension    WNL        Strength:  Quadriceps    4      Hamstrings    4+     Gluteus Medius   4+      Gluteus Jaskaran   4     Hip Flexors    4             Function:  LEFS 55% disability on initial evaluation. Patient scored a 23.75% on re-evaluation on the LEFS on re-evaluation on 5/2/18..    Tenderness to palpation:  Slight tenderness to L distal incision    ASSESSMENT: Patient possibly could be having medial knee pain due to some saphenous nerve entrapment that improved with stretching/femoral nerve glides.- patient reported a lot of improvement after performing these.     TREATMENT PROVIDED:  -Manual Therapy: 8 min  Femoral nerve glides/hip fl stretches   -Therapeutic Exercise:  40 min  Heel rock backs for quad stretch  Total gym 4 bands 5 min  Squats with 20# kettlebell  Sidestepping against TB  High knees/butt kicks/toe touches  Hamstring machine 35#  Leg extension 25#  Leg press 70#  SL sit<>stand  Standing gastroc stretch  Step downs  -Modalities: 15 min ice + TENS to  L knee   -Education: 5 min: on condition and HEP    PLAN:  Patient will benefit from physical therapy (1-2) x/week for (8-12) weeks including manual therapy, therapeutic exercise, functional activities, modalities, and patient education.    Thank you for this referral.    These services are reasonable and necessary for the conditions set forth above while under my care.    Yudi Mercado, PT, DPT

## 2018-05-23 ENCOUNTER — CLINICAL SUPPORT (OUTPATIENT)
Dept: REHABILITATION | Facility: HOSPITAL | Age: 53
End: 2018-05-23
Payer: COMMERCIAL

## 2018-05-23 DIAGNOSIS — Z98.890 S/P ACL RECONSTRUCTION: Primary | ICD-10-CM

## 2018-05-23 PROCEDURE — 97110 THERAPEUTIC EXERCISES: CPT

## 2018-05-23 PROCEDURE — 97014 ELECTRIC STIMULATION THERAPY: CPT

## 2018-05-23 PROCEDURE — 97140 MANUAL THERAPY 1/> REGIONS: CPT

## 2018-06-04 NOTE — PROGRESS NOTES
PHYSICAL THERAPY RE-EVALUATION    Referring Provider:  Yeny Rahman    Diagnosis:       ICD-10-CM ICD-9-CM    1. S/P ACL reconstruction Z98.890 V45.89      Orders:  Evaluate and Treat    Date of Initial Evaluation: 12/21/17 (re-eval needed for 6/2/18)    Visit # 33    BACKGROUND:  Patient reports that approximately 1 year ago is when she initially tore her ACL. She had been performing prehab for several months until she was finally able to undergo surgery on 12/14/17. She underwent an allograft and has Wbing precautions at 40%. She reports her pain as not been too bad, but she is noticing some swelling.      SUBJECTIVE: Patient reports her L knee is getting better with descending stairs, but still reports difficulty while performing. Also reports she has not been consistent with her exercises and working out at the gym.    OBJECTIVE:          AT EVAL       TODAY  Gait: Antalgic to L side, decreased weight shift, decreased heel to toe    Sensation: intact to light touch     Knee AROM:  Flexion      WNL      Extension    WNL  Knee PROM  Flexion      WNL      Extension    WNL        Strength:  Quadriceps    4+      Hamstrings    4+     Gluteus Medius   4+      Gluteus Jaskaran   4     Hip Flexors    4             Function:  LEFS 55% disability on initial evaluation. Patient scored a 25% on re-evaluation on the LEFS on re-evaluation on 6/6/18.    Tenderness to palpation:  Slight tenderness to L hamstring posterior knee    ASSESSMENT: Patient demonstrates improvement in ability to tolerate more there ex with less pain and able to perform more activity with more confidence. Patient able to perform quicker walking and slight jog with slow stops/starts without any adverse symptoms.  Patient needs continued skilled services in order to improve strength to WNL, decrease pain with all activities, and return to PLOF in order to improve quality of life.    Short Term Goals:  1-4 weeks  1. I with HEP MET  2. Increase  A/PROM from 0-90 degrees MET  3. Increase hip/knee strength 1/2 grade  MET  4. Pt demo decreased swelling at mid patella MET    Long Term Goals: 5-12 weeks  1.Ambulate with normalized gait pattern MET  2. Hip/knee strength WNL ALMOST MET  3. Knee ROM WNL MET  4. Decrease Pain to 0 with all activities ALMOST MET    TREATMENT PROVIDED:  -Manual Therapy: 5  min  Femoral nerve glides/hip fl stretches   -Therapeutic Exercise:  45 min  Heel rock backs for quad stretch  Total gym 4 bands 5 min DNT PERFORM TODAY  Squats with 20# kettlebell  Sidestepping against TB  High knees/butt kicks/toe touches  Hamstring machine 35#  Leg extension 25#  Leg press 70#  SL sit<>stand  Standing gastroc stretch  Step downs  Quick treadmill training  Slight jogs with stop and go  -Modalities: 10 min ice  -Education: 5 min: on condition and HEP    PLAN:  Patient will benefit from physical therapy (1-2) x/week for (8-12) weeks including manual therapy, therapeutic exercise, functional activities, modalities, and patient education.    Thank you for this referral.    These services are reasonable and necessary for the conditions set forth above while under my care.    Yudi Mercado, PT, DPT

## 2018-06-06 ENCOUNTER — PATIENT MESSAGE (OUTPATIENT)
Dept: INTERNAL MEDICINE | Facility: CLINIC | Age: 53
End: 2018-06-06

## 2018-06-06 ENCOUNTER — CLINICAL SUPPORT (OUTPATIENT)
Dept: REHABILITATION | Facility: HOSPITAL | Age: 53
End: 2018-06-06
Payer: COMMERCIAL

## 2018-06-06 DIAGNOSIS — Z98.890 S/P ACL RECONSTRUCTION: Primary | ICD-10-CM

## 2018-06-06 PROCEDURE — 97110 THERAPEUTIC EXERCISES: CPT

## 2018-06-07 ENCOUNTER — PATIENT MESSAGE (OUTPATIENT)
Dept: INTERNAL MEDICINE | Facility: CLINIC | Age: 53
End: 2018-06-07

## 2018-06-07 ENCOUNTER — OFFICE VISIT (OUTPATIENT)
Dept: INTERNAL MEDICINE | Facility: CLINIC | Age: 53
End: 2018-06-07
Payer: COMMERCIAL

## 2018-06-07 ENCOUNTER — HOSPITAL ENCOUNTER (OUTPATIENT)
Dept: RADIOLOGY | Facility: HOSPITAL | Age: 53
Discharge: HOME | End: 2018-06-07
Attending: PHYSICIAN ASSISTANT
Payer: COMMERCIAL

## 2018-06-07 VITALS
HEIGHT: 64 IN | TEMPERATURE: 100 F | WEIGHT: 230.63 LBS | DIASTOLIC BLOOD PRESSURE: 78 MMHG | HEART RATE: 86 BPM | BODY MASS INDEX: 39.37 KG/M2 | SYSTOLIC BLOOD PRESSURE: 112 MMHG | OXYGEN SATURATION: 96 %

## 2018-06-07 DIAGNOSIS — M79.604 PAIN IN BOTH LOWER EXTREMITIES: ICD-10-CM

## 2018-06-07 DIAGNOSIS — M79.605 PAIN IN BOTH LOWER EXTREMITIES: ICD-10-CM

## 2018-06-07 DIAGNOSIS — H65.93 FLUID LEVEL BEHIND TYMPANIC MEMBRANE OF BOTH EARS: ICD-10-CM

## 2018-06-07 DIAGNOSIS — M54.5 BILATERAL LOW BACK PAIN, UNSPECIFIED CHRONICITY, WITH SCIATICA PRESENCE UNSPECIFIED: Primary | ICD-10-CM

## 2018-06-07 DIAGNOSIS — M54.5 BILATERAL LOW BACK PAIN, UNSPECIFIED CHRONICITY, WITH SCIATICA PRESENCE UNSPECIFIED: ICD-10-CM

## 2018-06-07 DIAGNOSIS — J45.990 ASTHMA, EXERCISE INDUCED: ICD-10-CM

## 2018-06-07 PROCEDURE — 99999 PR PBB SHADOW E&M-EST. PATIENT-LVL V: CPT | Mod: PBBFAC,,, | Performed by: PHYSICIAN ASSISTANT

## 2018-06-07 PROCEDURE — 99214 OFFICE O/P EST MOD 30 MIN: CPT | Mod: S$GLB,,, | Performed by: PHYSICIAN ASSISTANT

## 2018-06-07 PROCEDURE — 3008F BODY MASS INDEX DOCD: CPT | Mod: CPTII,S$GLB,, | Performed by: PHYSICIAN ASSISTANT

## 2018-06-07 PROCEDURE — 72110 X-RAY EXAM L-2 SPINE 4/>VWS: CPT | Mod: 26,,, | Performed by: RADIOLOGY

## 2018-06-07 PROCEDURE — 72110 X-RAY EXAM L-2 SPINE 4/>VWS: CPT | Mod: TC,FY,PO

## 2018-06-07 PROCEDURE — 73521 X-RAY EXAM HIPS BI 2 VIEWS: CPT | Mod: 26,,, | Performed by: RADIOLOGY

## 2018-06-07 PROCEDURE — 73521 X-RAY EXAM HIPS BI 2 VIEWS: CPT | Mod: TC,FY,PO

## 2018-06-07 RX ORDER — FLUTICASONE PROPIONATE 50 MCG
2 SPRAY, SUSPENSION (ML) NASAL DAILY
Qty: 1 BOTTLE | Refills: 3 | Status: SHIPPED | OUTPATIENT
Start: 2018-06-07 | End: 2018-12-20 | Stop reason: SDUPTHER

## 2018-06-07 RX ORDER — METHYLPREDNISOLONE 4 MG/1
TABLET ORAL
Qty: 1 PACKAGE | Refills: 0 | Status: SHIPPED | OUTPATIENT
Start: 2018-06-07 | End: 2018-06-28

## 2018-06-07 NOTE — PROGRESS NOTES
Subjective:       Patient ID: Taryn Serrano is a 52 y.o. female.    Chief Complaint: Back Pain and Leg Pain    52 year old female c/o intermittent pain in B lower back with radiation down BLEs. She had L-spine xrays by ortho in 2010 that showed spondylosis. MRI and injections were to be considered if discomfort at that time persisted. She did not do MRI or have injections then. She reports walking down stairs and standing after being seated for a long time worsen her sxs. She feels the pain may be in B lateral hip regions as well, mostly on R. She reports no falls, fever, chills, numbness/tingling, muscular weakness, swelling, redness, incontinence, urinary sxs, or other associated sxs. She also c/o B ear fullness X 6 months. She reports rare intermittent tinnitus (cannot remember on which side) and has felt dizzy before but no dizziness recently. She had Flonase but has not used it in awhile - requests refill. She reports no ear pain/drainage, sore throat, fever, chills, headaches, confusion, numbness/tingling, speech/gait disturbance, vision changes,CP, SOB, or other medical complaints. She reports no asthma flares associated with her sxs.    PCP: Dr. Schroeder    Patient Active Problem List:     Circadian rhythm sleep disorder, irregular sleep-wake type     Paving stone degeneration of peripheral retina     Periodic limb movement disorder     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     Asthma, exercise induced     URBAN (obstructive sleep apnea)     Acquired hypothyroidism     Anxiety and depression     Vitamin D deficiency     Pseudophakia of both eyes     Dry eye     PCO (posterior capsular opacification)     Obesity, Class II, BMI 35-39.9, no comorbidity     Synovial cyst of left popliteal space     Left ACL tear     Tear of medial meniscus of left knee     Pure hypercholesterolemia      Review of Systems   Constitutional: Negative for chills and fever.   HENT: Negative for ear  "discharge, ear pain, rhinorrhea and sore throat.    Eyes: Negative for visual disturbance.   Respiratory: Negative for cough and shortness of breath.    Cardiovascular: Negative for chest pain, palpitations and leg swelling.   Gastrointestinal: Negative for abdominal pain, nausea and vomiting.   Genitourinary: Negative for dysuria, flank pain, frequency and urgency.   Musculoskeletal: Positive for back pain. Negative for joint swelling.   Skin: Negative for rash.   Neurological: Negative for syncope, facial asymmetry, speech difficulty, weakness, light-headedness, numbness and headaches.   Psychiatric/Behavioral: Negative for confusion.       Objective:       Vitals:    06/07/18 1039   BP: 112/78   BP Location: Right arm   Patient Position: Sitting   BP Method: Large (Manual)   Pulse: 86   Temp: 99.7 °F (37.6 °C)   TempSrc: Tympanic   SpO2: 96%   Weight: 104.6 kg (230 lb 9.6 oz)   Height: 5' 4" (1.626 m)     Physical Exam   Constitutional: She is oriented to person, place, and time. She appears well-developed and well-nourished. No distress.   HENT:   Head: Normocephalic and atraumatic.   Right Ear: Ear canal normal. A middle ear effusion is present.   Left Ear: Ear canal normal. A middle ear effusion is present.   Mouth/Throat: Oropharynx is clear and moist. No oropharyngeal exudate.   Eyes: EOM are normal. No scleral icterus.   Neck: Neck supple.   Cardiovascular: Normal rate and regular rhythm.    Pulmonary/Chest: Effort normal and breath sounds normal. No respiratory distress. She has no decreased breath sounds. She has no wheezes. She has no rhonchi. She has no rales.   Abdominal: There is no CVA tenderness.   Musculoskeletal: Normal range of motion. She exhibits no edema.   FROM spine, BUEs, and BLEs; NV intact and 5+ strength B; minimal B lumbar back TTP; no swelling   Neurological: She is alert and oriented to person, place, and time. No cranial nerve deficit.   Skin: Skin is warm and dry. No rash noted. "   Psychiatric: She has a normal mood and affect. Her speech is normal and behavior is normal. Thought content normal.       Assessment:       1. Bilateral low back pain, unspecified chronicity, with sciatica presence unspecified    2. Pain in both lower extremities    3. Fluid level behind tympanic membrane of both ears    4. Asthma, exercise induced        Plan:         Taryn was seen today for back pain and leg pain.    Diagnoses and all orders for this visit:    Bilateral low back pain, Pain in both lower extremities  -     X-Ray Lumbar Spine Complete 5 View; Future  -     Ambulatory referral to Pain Clinic for further management  -     X-Ray Hips Bilateral 2 View Incl AP Pelvis; Future  -     methylPREDNISolone (MEDROL DOSEPACK) 4 mg tablet; use as directed    Fluid level behind tympanic membrane of both ears  -     fluticasone (FLONASE) 50 mcg/actuation nasal spray; 2 sprays (100 mcg total) by Each Nare route once daily.  Recommend consistent use of Flonase. If no improvement of sxs in 2 weeks, consider ENT eval (sooner if needed/desired).    Asthma, exercise induced  Stable. Pt asymptomatic.    F/u with PCP as scheduled in 2 months for health management.

## 2018-06-07 NOTE — TELEPHONE ENCOUNTER
Please see about getting pt her note from Dr. Langston (2010). Results were released to her on [employer].

## 2018-06-12 NOTE — PROGRESS NOTES
PHYSICAL THERAPY DAILY NOTE    Referring Provider:  Yeny Rahman    Diagnosis:       ICD-10-CM ICD-9-CM    1. S/P ACL reconstruction Z98.890 V45.89      Orders:  Evaluate and Treat    Date of Initial Evaluation: 12/21/17 (re-eval needed for 7/2/18)    Visit # 34    BACKGROUND:  Patient reports that approximately 1 year ago is when she initially tore her ACL. She had been performing prehab for several months until she was finally able to undergo surgery on 12/14/17. She underwent an allograft and has Wbing precautions at 40%. She reports her pain as not been too bad, but she is noticing some swelling.      SUBJECTIVE: Patient reports that she did have some xrays in her lower back and hips that she was worried about.    OBJECTIVE:          AT EVAL       TODAY  Gait: Antalgic to L side, decreased weight shift, decreased heel to toe    Sensation: intact to light touch     Knee AROM:  Flexion      WNL      Extension    WNL  Knee PROM  Flexion      WNL      Extension    WNL        Strength:  Quadriceps    4+      Hamstrings    4+     Gluteus Medius   4+      Gluteus Jaskaran   4     Hip Flexors    4             Function:  LEFS 55% disability on initial evaluation. Patient scored a 25% on re-evaluation on the LEFS on re-evaluation on 6/6/18.    Tenderness to palpation:  Slight tenderness to L hamstring posterior knee    ASSESSMENT: Patient demonstrated good tolerance to there ex without any increase in pain. Patient educated on her xray results and that her findings were normal for age and that it was not anything to worry about. Patient educated to continue with doing her work outs and that adding in more core strengthening will help assist with that pain.     TREATMENT PROVIDED:  -Manual Therapy: 5  min  Femoral nerve glides/hip fl stretches   -Therapeutic Exercise:  45 min  Heel rock backs for quad stretch  Total gym 4 bands 2 min with jumps  Squats with 20# kettlebell  Sidestepping against TB  High knees/butt  kicks/toe touches  Hamstring machine 35#  Leg extension 25#  Leg press 70#  SL sit<>stand  Standing gastroc stretch  Step downs  Quick treadmill training  Slight jogs with stop and go  -Modalities: 10 min ice  -Education: 5 min: on condition and HEP    PLAN:  Patient will benefit from physical therapy (1-2) x/week for (8-12) weeks including manual therapy, therapeutic exercise, functional activities, modalities, and patient education.    Thank you for this referral.    These services are reasonable and necessary for the conditions set forth above while under my care.    Yudi Mercado, PT, DPT

## 2018-06-13 ENCOUNTER — CLINICAL SUPPORT (OUTPATIENT)
Dept: REHABILITATION | Facility: HOSPITAL | Age: 53
End: 2018-06-13
Payer: COMMERCIAL

## 2018-06-13 DIAGNOSIS — Z98.890 S/P ACL RECONSTRUCTION: Primary | ICD-10-CM

## 2018-06-13 PROCEDURE — 97110 THERAPEUTIC EXERCISES: CPT

## 2018-06-20 ENCOUNTER — CLINICAL SUPPORT (OUTPATIENT)
Dept: REHABILITATION | Facility: HOSPITAL | Age: 53
End: 2018-06-20
Payer: COMMERCIAL

## 2018-06-20 DIAGNOSIS — Z98.890 S/P ACL RECONSTRUCTION: Primary | ICD-10-CM

## 2018-06-20 PROCEDURE — 97110 THERAPEUTIC EXERCISES: CPT

## 2018-06-20 RX ORDER — LEVOTHYROXINE SODIUM 112 UG/1
112 TABLET ORAL DAILY
Qty: 30 TABLET | Refills: 11 | Status: SHIPPED | OUTPATIENT
Start: 2018-06-20 | End: 2018-11-05 | Stop reason: SDUPTHER

## 2018-06-20 NOTE — PROGRESS NOTES
PHYSICAL THERAPY DAILY NOTE    Referring Provider:  Yeny Rahman    Diagnosis:       ICD-10-CM ICD-9-CM    1. S/P ACL reconstruction Z98.890 V45.89      Orders:  Evaluate and Treat    Date of Initial Evaluation: 12/21/17 (re-eval needed for 7/2/18)    Visit # 35    BACKGROUND:  Patient reports that approximately 1 year ago is when she initially tore her ACL. She had been performing prehab for several months until she was finally able to undergo surgery on 12/14/17. She underwent an allograft and has Wbing precautions at 40%. She reports her pain as not been too bad, but she is noticing some swelling.      SUBJECTIVE: Patient reports her kickboxing class has been going okay, but still very hesitant with any kind of jumping.    OBJECTIVE:          AT EVAL       TODAY  Gait: Antalgic to L side, decreased weight shift, decreased heel to toe    Sensation: intact to light touch     Knee AROM:  Flexion      WNL      Extension    WNL  Knee PROM  Flexion      WNL      Extension    WNL        Strength:  Quadriceps    4+      Hamstrings    4+     Gluteus Medius   4+      Gluteus Jaskaran   4     Hip Flexors    4             Function:  LEFS 55% disability on initial evaluation. Patient scored a 25% on re-evaluation on the LEFS on re-evaluation on 6/6/18.    Tenderness to palpation:  Slight tenderness to L hamstring posterior knee    ASSESSMENT: Patient demonstrated good tolerance to there ex and jumping with more confidence by the end of the session.     TREATMENT PROVIDED:  -Manual Therapy: 5  min  Femoral nerve glides/hip fl stretches   -Therapeutic Exercise:  45 min  Heel rock backs for quad stretch  Total gym 5 bands SL squats  Squats with 20# kettlebell  Sidestepping against TB DNT PERFORM THIS SESSION  High knees/butt kicks/toe touches  Hamstring machine 35#  Leg extension 25#  Leg press 70#  SL sit<>stand  Standing gastroc stretch  Step downs  Jumps forward   Jumps side to side  -Modalities: 10 min  ice  -Education: 5 min: on condition and HEP    PLAN:  Patient will benefit from physical therapy (1-2) x/week for (8-12) weeks including manual therapy, therapeutic exercise, functional activities, modalities, and patient education.    Thank you for this referral.    These services are reasonable and necessary for the conditions set forth above while under my care.    Yudi Mercado, PT, DPT

## 2018-06-22 NOTE — PROGRESS NOTES
PHYSICAL THERAPY DAILY NOTE    Referring Provider:  Yeny Rahman    Diagnosis:       ICD-10-CM ICD-9-CM    1. S/P ACL reconstruction Z98.890 V45.89      Orders:  Evaluate and Treat    Date of Initial Evaluation: 12/21/17 (re-eval needed for 7/2/18)    Visit # 36    BACKGROUND:  Patient reports that approximately 1 year ago is when she initially tore her ACL. She had been performing prehab for several months until she was finally able to undergo surgery on 12/14/17. She underwent an allograft and has Wbing precautions at 40%. She reports her pain as not been too bad, but she is noticing some swelling.      SUBJECTIVE: Patient reports having a little cramping in her hamstring over there weekend.     OBJECTIVE:          AT EVAL       TODAY  Gait: Antalgic to L side, decreased weight shift, decreased heel to toe    Sensation: intact to light touch     Knee AROM:  Flexion      WNL      Extension    WNL  Knee PROM  Flexion      WNL      Extension    WNL        Strength:  Quadriceps    4+      Hamstrings    4+     Gluteus Medius   4+      Gluteus Jaskaran   4     Hip Flexors    4             Function:  LEFS 55% disability on initial evaluation. Patient scored a 25% on re-evaluation on the LEFS on re-evaluation on 6/6/18.    Tenderness to palpation:  Slight tenderness to L hamstring posterior knee    ASSESSMENT: Patient demonstrated good tolerance to jogging in 30 second intervals without any increase in pain and improvement in confidence with jumping.    TREATMENT PROVIDED:  -Manual Therapy: 5  min  Femoral nerve glides/hip fl stretches   -Therapeutic Exercise:  45 min  Heel rock backs for quad stretch  Total gym 5 bands SL squats  Squats with 20# kettlebell  Sidestepping against TB   High knees/butt kicks/toe touches  Hamstring machine 35#  Leg extension 25#  Leg press 70#  SL sit<>stand  Standing gastroc stretch  Step downs  Jumps forward   Jumps side to side  -Modalities: 10 min ice  -Education: 5 min: on  condition and HEP    PLAN:  Patient will benefit from physical therapy (1-2) x/week for (8-12) weeks including manual therapy, therapeutic exercise, functional activities, modalities, and patient education.    Thank you for this referral.    These services are reasonable and necessary for the conditions set forth above while under my care.    Yudi Mercado, PT, DPT

## 2018-06-27 ENCOUNTER — CLINICAL SUPPORT (OUTPATIENT)
Dept: REHABILITATION | Facility: HOSPITAL | Age: 53
End: 2018-06-27
Payer: COMMERCIAL

## 2018-06-27 DIAGNOSIS — Z98.890 S/P ACL RECONSTRUCTION: Primary | ICD-10-CM

## 2018-06-27 PROCEDURE — 97110 THERAPEUTIC EXERCISES: CPT

## 2018-07-16 NOTE — PROGRESS NOTES
PHYSICAL THERAPY RE-EVALUATION    Referring Provider:  Yeny Rahman    Diagnosis:       ICD-10-CM ICD-9-CM    1. S/P ACL reconstruction Z98.890 V45.89      Orders:  Evaluate and Treat    Date of Initial Evaluation: 12/21/17 (re-eval needed for 7/2/18)    Visit # 37    BACKGROUND:  Patient reports that approximately 1 year ago is when she initially tore her ACL. She had been performing prehab for several months until she was finally able to undergo surgery on 12/14/17. She underwent an allograft and has Wbing precautions at 40%. She reports her pain as not been too bad, but she is noticing some swelling.      SUBJECTIVE: Patient reports her knee has been doing good, but still hesitant with more dynamic activities and still having some trouble with her lower back.    OBJECTIVE:          AT EVAL       TODAY  Gait: Antalgic to L side, decreased weight shift, decreased heel to toe    Sensation: intact to light touch     Knee AROM:  Flexion      WNL      Extension    WNL  Knee PROM  Flexion      WNL      Extension    WNL        Strength:  Quadriceps    4+      Hamstrings    4+     Gluteus Medius   4+      Gluteus Jaskaran   4+     Hip Flexors    4 +            Function:  LEFS 55% disability on initial evaluation. Patient scored a 8.75% on re-evaluation on the LEFS on re-evaluation on 7/18/18.    Tenderness to palpation:  none    ASSESSMENT: Patient demonstrates great improvement in tolerance to all functional activities and able to perform stairs without any trouble. Patient continues to have some difficulty with higher level activities such as running, jumping jacks, deep squats, and lunges and given progressions to perform in order to improve quality of movements and increase ability to perform.    Short Term Goals:  1-4 weeks  1. I with HEP MET  2. Increase A/PROM from 0-90 degrees MET  3. Increase hip/knee strength 1/2 grade  MET  4. Pt demo decreased swelling at mid patella MET    Long Term Goals: 5-12  weeks  1.Ambulate with normalized gait pattern MET  2. Hip/knee strength WNL MET  3. Knee ROM WNL MET  4. Decrease Pain to 0 with all activities ALMOST MET (still slight pain with more higher level activities)    TREATMENT PROVIDED:  -Manual Therapy: 5  min  Femoral nerve glides/hip fl stretches   -Therapeutic Exercise:  45 min  Heel rock backs for quad stretch  Sidestepping against TB   High knees/butt kicks/toe touches  Lunges  SL deep squat  SL reachdown  Jumping jacks  SL sit<>stand  Standing gastroc stretch  Step downs  Jumps forward   Jumps side to side  -Modalities: none provided this session  -Education: 5 min: on condition and HEP    PLAN:  Patient will benefit from physical therapy (1)x for very 2-3 weeks for 6-8 weeks including manual therapy, therapeutic exercise, functional activities, modalities, and patient education.    Thank you for this referral.    These services are reasonable and necessary for the conditions set forth above while under my care.    Yudi Mercado, PT, DPT

## 2018-07-18 ENCOUNTER — CLINICAL SUPPORT (OUTPATIENT)
Dept: REHABILITATION | Facility: HOSPITAL | Age: 53
End: 2018-07-18
Payer: COMMERCIAL

## 2018-07-18 ENCOUNTER — PATIENT MESSAGE (OUTPATIENT)
Dept: REHABILITATION | Facility: HOSPITAL | Age: 53
End: 2018-07-18

## 2018-07-18 DIAGNOSIS — Z98.890 S/P ACL RECONSTRUCTION: Primary | ICD-10-CM

## 2018-07-18 PROCEDURE — 97110 THERAPEUTIC EXERCISES: CPT

## 2018-08-06 ENCOUNTER — PATIENT OUTREACH (OUTPATIENT)
Dept: ADMINISTRATIVE | Facility: HOSPITAL | Age: 53
End: 2018-08-06

## 2018-08-07 ENCOUNTER — PATIENT MESSAGE (OUTPATIENT)
Dept: INTERNAL MEDICINE | Facility: CLINIC | Age: 53
End: 2018-08-07

## 2018-08-07 RX ORDER — ESOMEPRAZOLE MAGNESIUM 40 MG/1
40 CAPSULE, DELAYED RELEASE ORAL
Qty: 30 CAPSULE | Refills: 6 | Status: SHIPPED | OUTPATIENT
Start: 2018-08-07 | End: 2020-01-27

## 2018-08-08 NOTE — PROGRESS NOTES
"Subjective:      Patient ID: Taryn Serrano is a 53 y.o. female.    Chief Complaint: Follow-up      HPI  Here for follow up of medical problems.  Now doing kickboxing challenge with meal plan, has lost 9#.  Energy good.  Asthma doing well.  Has a lumbar DJD- sched to see PMR next month.  C/o knees, low back, ankles bilat.  Anxiety doing well.  No cp/sob/palp.  BMs normal.  Urine normal.    Updated/ annual due 2/19:  HM: 10/17 fluvax, 1/15 [id number], 10/13 [id number] booster, 7/09 TDaP, 3/18 MMG/ Gyn Dr. Alfonso, 1/16 BMD low fx risk rep 5y, 7/12 Cscope rep 10y, 2/17 HCV neg.     Review of Systems   Constitutional: Negative for chills, diaphoresis and fever.   Respiratory: Negative for cough and shortness of breath.    Cardiovascular: Negative for chest pain, palpitations and leg swelling.   Gastrointestinal: Negative for blood in stool, constipation, diarrhea, nausea and vomiting.   Genitourinary: Negative for dysuria, frequency and hematuria.   Psychiatric/Behavioral: The patient is not nervous/anxious.          Objective:   /78 (BP Location: Right arm, Patient Position: Sitting)   Pulse 80   Temp 97.7 °F (36.5 °C) (Tympanic)   Ht 5' 4" (1.626 m)   Wt 100.5 kg (221 lb 9 oz)   LMP 01/07/2015   SpO2 96%   BMI 38.03 kg/m²     Physical Exam   Constitutional: She is oriented to person, place, and time. She appears well-developed and well-nourished.   HENT:   Right Ear: External ear normal. Tympanic membrane is not injected.   Left Ear: External ear normal. Tympanic membrane is not injected.   Mouth/Throat: Oropharynx is clear and moist.   Eyes: Conjunctivae are normal.   Neck: Normal range of motion. Neck supple. No thyromegaly present.   Cardiovascular: Normal rate, regular rhythm and intact distal pulses. Exam reveals no gallop and no friction rub.   No murmur heard.  Pulmonary/Chest: Effort normal and breath sounds normal. She has no wheezes. She has no rales.   Abdominal: Soft. Bowel sounds are normal. " She exhibits no mass. There is no tenderness.   Musculoskeletal: She exhibits no edema.   Lymphadenopathy:     She has no cervical adenopathy.   Neurological: She is alert and oriented to person, place, and time.   Skin: Skin is warm. No rash noted.   Psychiatric: She has a normal mood and affect.           Assessment:       1. Arthralgia, unspecified joint    2. Pure hypercholesterolemia    3. Vitamin D deficiency    4. URBAN (obstructive sleep apnea)    5. Asthma, exercise induced    6. Anxiety and depression    7. Acquired hypothyroidism    8. CKD (chronic kidney disease) stage 3, GFR 30-59 ml/min    9. Preventive measure          Plan:     Arthralgia, unspecified joint  -     turmeric root extract 500 mg Cap; Take 1,000 capsules by mouth once daily.  Dispense: 100 capsule; Refill: 6    Pure hypercholesterolemia- recheck 6mo.    Vitamin D deficiency  -     Vitamin D; Future    URBAN (obstructive sleep apnea) on cpap.  Pulm f/u.    Asthma, exercise induced- doing well.    Anxiety and depression- doing well, cont rx.    Acquired hypothyroidism- Clinically stable, continue present treatment.    CKD (chronic kidney disease) stage 3, GFR 30-59 ml/min- follow.    Preventive measure- due in 6mo.  Continue wt loss, poss phent if 10# more loss.  -     CBC auto differential; Future; Expected date: 08/17/2018  -     Comprehensive metabolic panel; Future; Expected date: 08/17/2018  -     Lipid panel; Future; Expected date: 08/17/2018  -     TSH; Future; Expected date: 08/17/2018  -     Vitamin D; Future

## 2018-08-17 ENCOUNTER — OFFICE VISIT (OUTPATIENT)
Dept: INTERNAL MEDICINE | Facility: CLINIC | Age: 53
End: 2018-08-17
Payer: COMMERCIAL

## 2018-08-17 VITALS
WEIGHT: 221.56 LBS | OXYGEN SATURATION: 96 % | SYSTOLIC BLOOD PRESSURE: 124 MMHG | DIASTOLIC BLOOD PRESSURE: 78 MMHG | TEMPERATURE: 98 F | HEART RATE: 80 BPM | HEIGHT: 64 IN | BODY MASS INDEX: 37.83 KG/M2

## 2018-08-17 DIAGNOSIS — E03.9 ACQUIRED HYPOTHYROIDISM: ICD-10-CM

## 2018-08-17 DIAGNOSIS — J45.990 ASTHMA, EXERCISE INDUCED: ICD-10-CM

## 2018-08-17 DIAGNOSIS — E78.00 PURE HYPERCHOLESTEROLEMIA: ICD-10-CM

## 2018-08-17 DIAGNOSIS — M25.50 ARTHRALGIA, UNSPECIFIED JOINT: Primary | ICD-10-CM

## 2018-08-17 DIAGNOSIS — Z29.9 PREVENTIVE MEASURE: ICD-10-CM

## 2018-08-17 DIAGNOSIS — E55.9 VITAMIN D DEFICIENCY: ICD-10-CM

## 2018-08-17 DIAGNOSIS — N18.30 CKD (CHRONIC KIDNEY DISEASE) STAGE 3, GFR 30-59 ML/MIN: ICD-10-CM

## 2018-08-17 DIAGNOSIS — F41.9 ANXIETY AND DEPRESSION: ICD-10-CM

## 2018-08-17 DIAGNOSIS — F32.A ANXIETY AND DEPRESSION: ICD-10-CM

## 2018-08-17 DIAGNOSIS — G47.33 OSA (OBSTRUCTIVE SLEEP APNEA): ICD-10-CM

## 2018-08-17 PROCEDURE — 3008F BODY MASS INDEX DOCD: CPT | Mod: CPTII,S$GLB,, | Performed by: INTERNAL MEDICINE

## 2018-08-17 PROCEDURE — 99999 PR PBB SHADOW E&M-EST. PATIENT-LVL III: CPT | Mod: PBBFAC,,, | Performed by: INTERNAL MEDICINE

## 2018-08-17 PROCEDURE — 99214 OFFICE O/P EST MOD 30 MIN: CPT | Mod: S$GLB,,, | Performed by: INTERNAL MEDICINE

## 2018-08-17 RX ORDER — GUAIFENESIN AND PHENYLEPHRINE HCL 400; 10 MG/1; MG/1
1000 TABLET ORAL DAILY
Qty: 100 CAPSULE | Refills: 6 | Status: SHIPPED | OUTPATIENT
Start: 2018-08-17 | End: 2023-09-18

## 2018-08-18 DIAGNOSIS — F32.A ANXIETY AND DEPRESSION: ICD-10-CM

## 2018-08-18 DIAGNOSIS — F41.9 ANXIETY AND DEPRESSION: ICD-10-CM

## 2018-08-20 RX ORDER — FLUOXETINE HYDROCHLORIDE 40 MG/1
CAPSULE ORAL
Qty: 60 CAPSULE | Refills: 6 | Status: SHIPPED | OUTPATIENT
Start: 2018-08-20 | End: 2019-03-27 | Stop reason: SDUPTHER

## 2018-08-27 ENCOUNTER — OFFICE VISIT (OUTPATIENT)
Dept: PULMONOLOGY | Facility: CLINIC | Age: 53
End: 2018-08-27
Payer: COMMERCIAL

## 2018-08-27 VITALS
HEIGHT: 64 IN | HEART RATE: 82 BPM | WEIGHT: 224.19 LBS | RESPIRATION RATE: 18 BRPM | SYSTOLIC BLOOD PRESSURE: 110 MMHG | BODY MASS INDEX: 38.27 KG/M2 | DIASTOLIC BLOOD PRESSURE: 70 MMHG

## 2018-08-27 DIAGNOSIS — E66.9 OBESITY, CLASS II, BMI 35-39.9: ICD-10-CM

## 2018-08-27 DIAGNOSIS — G47.33 OSA (OBSTRUCTIVE SLEEP APNEA): Primary | ICD-10-CM

## 2018-08-27 PROCEDURE — 99213 OFFICE O/P EST LOW 20 MIN: CPT | Mod: S$GLB,,, | Performed by: NURSE PRACTITIONER

## 2018-08-27 PROCEDURE — 99999 PR PBB SHADOW E&M-EST. PATIENT-LVL III: CPT | Mod: PBBFAC,,, | Performed by: NURSE PRACTITIONER

## 2018-08-27 PROCEDURE — 3008F BODY MASS INDEX DOCD: CPT | Mod: CPTII,S$GLB,, | Performed by: NURSE PRACTITIONER

## 2018-08-27 NOTE — PROGRESS NOTES
"Subjective:      Patient ID: Taryn Serrano is a 53 y.o. female.    Chief Complaint: Sleep Apnea    HPI  Presents to office for review of AutoPAP therapy. Last visit 2015.  Patient states improved symptoms with use of AutoPAP. Sleeping more soundly. Waking up feeling more refreshed. Improved daytime sleepiness. Patient states she is benefiting from use of the AutoPAP. She needs new supplies    Patient Active Problem List   Diagnosis    Circadian rhythm sleep disorder, irregular sleep-wake type    Paving stone degeneration of peripheral retina    Periodic limb movement disorder    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    Asthma, exercise induced    URBAN (obstructive sleep apnea)    Acquired hypothyroidism    Anxiety and depression    Vitamin D deficiency    Pseudophakia of both eyes    Dry eye    PCO (posterior capsular opacification)    Obesity, Class II, BMI 35-39.9, no comorbidity    Synovial cyst of left popliteal space    Left ACL tear    Tear of medial meniscus of left knee    Pure hypercholesterolemia    CKD (chronic kidney disease) stage 3, GFR 30-59 ml/min       /70   Pulse 82   Resp 18   Ht 5' 4" (1.626 m)   Wt 101.7 kg (224 lb 3.3 oz)   LMP 01/07/2015   BMI 38.49 kg/m²   Body mass index is 38.49 kg/m².    Review of Systems   Constitutional: Negative.    HENT: Negative.    Respiratory: Negative.    Cardiovascular: Negative.    Musculoskeletal: Positive for arthralgias.   Gastrointestinal: Negative.    Neurological: Negative.    Psychiatric/Behavioral: Negative.      Objective:      Physical Exam   Constitutional: She is oriented to person, place, and time. She appears well-developed and well-nourished.   HENT:   Head: Normocephalic and atraumatic.   Mouth/Throat: Oropharynx is clear and moist.   Neck: Normal range of motion. Neck supple.   Cardiovascular: Normal rate and regular rhythm.   Pulmonary/Chest: Effort normal and breath sounds normal. "   Musculoskeletal: Normal range of motion. She exhibits no edema.   Neurological: She is alert and oriented to person, place, and time.   Skin: Skin is warm and dry.   Psychiatric: She has a normal mood and affect.     Personal Diagnostic Review    Autopap download: Patient wears on average 7 hrs and 6 minutes. Greater than 4 hrs 100 % of the time. 90% percentile pressure 10 with AHI 5 events/hr    Assessment:       1. URBAN (obstructive sleep apnea)    2. Obesity, Class II, BMI 35-39.9        Outpatient Encounter Medications as of 8/27/2018   Medication Sig Dispense Refill    albuterol (VENTOLIN HFA) 90 mcg/actuation inhaler Inhale 1-2 puffs into the lungs every 6 (six) hours as needed for Wheezing. 18 g 0    buPROPion (WELLBUTRIN XL) 150 MG TB24 tablet TAKE 1 TABLET (150 MG TOTAL) BY MOUTH ONCE DAILY. 30 tablet 9    cholecalciferol, vitamin D3, 2,000 unit Cap Take 1 capsule (2,000 Units total) by mouth once daily. 100 capsule 6    esomeprazole (NEXIUM) 40 MG capsule Take 1 capsule (40 mg total) by mouth before breakfast. daily PRN 30 capsule 6    FLUoxetine (PROZAC) 40 MG capsule TAKE 2 CAPSULES (80 MG TOTAL) BY MOUTH ONCE DAILY. 60 capsule 6    fluticasone (FLONASE) 50 mcg/actuation nasal spray 2 sprays (100 mcg total) by Each Nare route once daily. 1 Bottle 3    fluticasone-salmeterol 500-50 mcg/dose (ADVAIR DISKUS) 500-50 mcg/dose DsDv diskus inhaler Inhale 1 puff into the lungs 2 (two) times daily. 60 each 6    levothyroxine (SYNTHROID) 112 MCG tablet TAKE 1 TABLET (112 MCG TOTAL) BY MOUTH ONCE DAILY. 30 tablet 11    turmeric root extract 500 mg Cap Take 1,000 capsules by mouth once daily. 100 capsule 6    meloxicam (MOBIC) 15 MG tablet Take 1 tablet (15 mg total) by mouth once daily. Take with foodl 30 tablet 0     Facility-Administered Encounter Medications as of 8/27/2018   Medication Dose Route Frequency Provider Last Rate Last Dose    ondansetron HCl (PF) 4 mg/2 mL injection    PRN Laci Smalls  CRNA   4 mg at 07/07/15 0846     Orders Placed This Encounter   Procedures    CPAP/BIPAP SUPPLIES     Order Specific Question:   Type of mask:     Answer:   Nasal     Order Specific Question:   Headgear?     Answer:   Yes     Order Specific Question:   Tubing?     Answer:   Yes     Order Specific Question:   Humidifier chamber?     Answer:   Yes     Order Specific Question:   Chin strap?     Answer:   Yes     Order Specific Question:   Filters?     Answer:   Yes     Order Specific Question:   Length of need (1-99 months):     Answer:   99     Plan:   Doing well on PAP settings. Coordination with DME today for supplies.  Patient is compliant. Follow up in 12 months with PAP data download or call earlier if any problems.

## 2018-08-27 NOTE — LETTER
August 27, 2018      Alyce Schroeder MD  [address] [facility] [address]  [place] [address]           [facility]  [address] [place] [address]  Phone: [phone]  Fax: [phone]          Patient: Taryn Serrano   MR Number: [id number]   YOB: 1965   Date of Visit: 8/27/2018       Dear Dr. Alyce Schroeder:    Thank you for referring Taryn Serrano to me for evaluation. Attached you will find relevant portions of my assessment and plan of care.    If you have questions, please do not hesitate to call me. I look forward to following Taryn Serrano along with you.    Sincerely,    Elizabeth Lejeune, RACHAEL    Enclosure  CC:  No Recipients    If you would like to receive this communication electronically, please contact [email or web address] or [phone] to request more information on [facility] access.    For providers and/or their staff who would like to refer a patient to [facility], please contact us through our one-stop-shop provider referral line, [facility], at [phone].    If you feel you have received this communication in error or would no longer like to receive these types of communications, please e-mail [email or web address]

## 2018-09-11 ENCOUNTER — OFFICE VISIT (OUTPATIENT)
Dept: PAIN MEDICINE | Facility: CLINIC | Age: 53
End: 2018-09-11
Payer: COMMERCIAL

## 2018-09-11 ENCOUNTER — OFFICE VISIT (OUTPATIENT)
Dept: ORTHOPEDICS | Facility: CLINIC | Age: 53
End: 2018-09-11
Payer: COMMERCIAL

## 2018-09-11 VITALS
WEIGHT: 224 LBS | HEART RATE: 77 BPM | BODY MASS INDEX: 38.24 KG/M2 | SYSTOLIC BLOOD PRESSURE: 119 MMHG | HEIGHT: 64 IN | DIASTOLIC BLOOD PRESSURE: 70 MMHG

## 2018-09-11 VITALS
WEIGHT: 224 LBS | SYSTOLIC BLOOD PRESSURE: 109 MMHG | HEART RATE: 65 BPM | HEIGHT: 64 IN | BODY MASS INDEX: 38.24 KG/M2 | RESPIRATION RATE: 18 BRPM | DIASTOLIC BLOOD PRESSURE: 74 MMHG

## 2018-09-11 DIAGNOSIS — M25.561 CHRONIC PAIN OF BOTH KNEES: ICD-10-CM

## 2018-09-11 DIAGNOSIS — M47.816 LUMBAR SPONDYLOSIS: Primary | ICD-10-CM

## 2018-09-11 DIAGNOSIS — E66.9 OBESITY, CLASS II, BMI 35-39.9, NO COMORBIDITY: ICD-10-CM

## 2018-09-11 DIAGNOSIS — M47.816 LUMBAR FACET ARTHROPATHY: ICD-10-CM

## 2018-09-11 DIAGNOSIS — M25.562 CHRONIC PAIN OF BOTH KNEES: ICD-10-CM

## 2018-09-11 DIAGNOSIS — G89.29 CHRONIC PAIN OF BOTH KNEES: ICD-10-CM

## 2018-09-11 DIAGNOSIS — Z98.890 S/P ACL RECONSTRUCTION: Primary | ICD-10-CM

## 2018-09-11 PROCEDURE — 3008F BODY MASS INDEX DOCD: CPT | Mod: CPTII,S$GLB,, | Performed by: PHYSICIAN ASSISTANT

## 2018-09-11 PROCEDURE — 99204 OFFICE O/P NEW MOD 45 MIN: CPT | Mod: S$GLB,,, | Performed by: ANESTHESIOLOGY

## 2018-09-11 PROCEDURE — 99999 PR PBB SHADOW E&M-EST. PATIENT-LVL III: CPT | Mod: PBBFAC,,, | Performed by: ANESTHESIOLOGY

## 2018-09-11 PROCEDURE — 99999 PR PBB SHADOW E&M-EST. PATIENT-LVL III: CPT | Mod: PBBFAC,,, | Performed by: PHYSICIAN ASSISTANT

## 2018-09-11 PROCEDURE — 3008F BODY MASS INDEX DOCD: CPT | Mod: CPTII,S$GLB,, | Performed by: ANESTHESIOLOGY

## 2018-09-11 PROCEDURE — 99213 OFFICE O/P EST LOW 20 MIN: CPT | Mod: S$GLB,,, | Performed by: PHYSICIAN ASSISTANT

## 2018-09-11 NOTE — LETTER
September 11, 2018      BLAIR Benz  [address] [place] [address]           [facility]  [address] [place] [address]  Phone: [phone]  Fax: [phone]          Patient: Taryn Serrano   MR Number: [id number]   YOB: 1965   Date of Visit: 9/11/2018       Dear Rere Kahn:    Thank you for referring Taryn Serrano to me for evaluation. Attached you will find relevant portions of my assessment and plan of care.    If you have questions, please do not hesitate to call me. I look forward to following Taryn Serrano along with you.    Sincerely,    Nicholas Malcolm MD    Enclosure  CC:  No Recipients    If you would like to receive this communication electronically, please contact [email or web address] or [phone] to request more information on [facility] access.    For providers and/or their staff who would like to refer a patient to [facility], please contact us through our one-stop-shop provider referral line, [facility] , at [phone].    If you feel you have received this communication in error or would no longer like to receive these types of communications, please e-mail [email or web address]

## 2018-09-11 NOTE — PROGRESS NOTES
Patient ID: Taryn Serrano is a 53 y.o. female.    Chief Complaint: Follow-up of the Left Knee      HPI: Taryn Serrano  is a 53 y.o. female who c/o Follow-up of the Left Knee   for duration of 9 months.  She underwent a left knee ACL reconstruction by Dr. Daly in December of last year.  She comes in today for routine follow-up.  She is progressing very well. She has completed her physical therapy.  Pain level today is 0/10 in severity.  She has intermittent occasional throbbing.  However, it does not limit her day-to-day activities.  Alleviating factors include stretching and strengthening.  Aggravating factors include nothing.  She has a little leery walking on uneven surfaces, but otherwise is doing great and has no functional limitations.  She is currently seeing Dr. Malcolm [facility] for lumbosacral pain as well as radiating leg pain.    Past Medical History:   Diagnosis Date    Anxiety and depression     Asthma, exercise induced     Dry eyes     History of ADHD     Hypothyroid     URBAN (obstructive sleep apnea)     Vitamin D deficiency      Past Surgical History:   Procedure Laterality Date    ANKLE SURGERY Right     ARTHROSCOPY-MENISCECTOMY Left 12/14/2017    [facility]aly [facility] at [facility] OR    CARPAL TUNNEL RELEASE Bilateral     CATARACT EXTRACTION BILATERAL W/ ANTERIOR VITRECTOMY      CATARACT EXTRACTION W/  INTRAOCULAR LENS IMPLANT  OU    KNEE SURGERY      right    OPEN REDUCTION INTERNAL FIXATION-ANKLE Right 7/7/2015    Performed by Mavis Juarez DPM at [facility] OR    PLACEMENT-GRAFT Left 12/14/2017    [facility]aly [facility] at [facility] OR    RECONSTRUCTION-LIGAMENT ANTERIOR CRUCIATE-ARTHROSCOPIC Left 12/14/2017    Performed by [facility]an [facility]aly MD at [facility] OR     Family History   Problem Relation Age of Onset    Cataracts Father     Alzheimer's disease Father     Colon cancer Father     Heart failure Mother     Melanoma Neg Hx     Psoriasis Neg Hx      Lupus Neg Hx     Eczema Neg Hx     Acne Neg Hx     Breast cancer Neg Hx     Ovarian cancer Neg Hx     Thrombophilia Neg Hx      Social History     Socioeconomic History    Marital status:      Spouse name: Not on file    Number of children: 2    Years of education: Not on file    Highest education level: Not on file   Social Needs    Financial resource strain: Not on file    Food insecurity - worry: Not on file    Food insecurity - inability: Not on file    Transportation needs - medical: Not on file    Transportation needs - non-medical: Not on file   Occupational History     Employer: [employer]   Tobacco Use    Smoking status: Never Smoker    Smokeless tobacco: Never Used   Substance and Sexual Activity    Alcohol use: Yes     Comment: social    Drug use: No    Sexual activity: Not Currently   Other Topics Concern    Are you pregnant or think you may be? No    Breast-feeding No   Social History Narrative    Full time emplyed.        Medication List           Accurate as of 9/11/18  9:22 AM. If you have any questions, ask your nurse or doctor.               CONTINUE taking these medications    albuterol 90 mcg/actuation inhaler  Commonly known as:  VENTOLIN HFA  Inhale 1-2 puffs into the lungs every 6 (six) hours as needed for Wheezing.     buPROPion 150 MG TB24 tablet  Commonly known as:  WELLBUTRIN XL  TAKE 1 TABLET (150 MG TOTAL) BY MOUTH ONCE DAILY.     cholecalciferol (vitamin D3) 2,000 unit Cap  Take 1 capsule (2,000 Units total) by mouth once daily.     esomeprazole 40 MG capsule  Commonly known as:  NexIUM  Take 1 capsule (40 mg total) by mouth before breakfast. daily PRN     FLUoxetine 40 MG capsule  Commonly known as:  PROZAC  TAKE 2 CAPSULES (80 MG TOTAL) BY MOUTH ONCE DAILY.     fluticasone 50 mcg/actuation nasal spray  Commonly known as:  FLONASE  2 sprays (100 mcg total) by Each Nare route once daily.     fluticasone-salmeterol 500-50 mcg/dose 500-50 mcg/dose Dsdv  diskus inhaler  Commonly known as:  ADVAIR DISKUS  Inhale 1 puff into the lungs 2 (two) times daily.     levothyroxine 112 MCG tablet  Commonly known as:  SYNTHROID  TAKE 1 TABLET (112 MCG TOTAL) BY MOUTH ONCE DAILY.     meloxicam 15 MG tablet  Commonly known as:  MOBIC  Take 1 tablet (15 mg total) by mouth once daily. Take with foodl     turmeric root extract 500 mg Cap  Take 1,000 capsules by mouth once daily.          Review of patient's allergies indicates:   Allergen Reactions    Grass pollen-perennial rye, standard Shortness Of Breath    Horsetail (equisetum arvense) Shortness Of Breath    Mold extracts Shortness Of Breath           Objective:        General    Nursing note and vitals reviewed.  Constitutional: She is oriented to person, place, and time. She appears well-developed and well-nourished.   HENT:   Head: Normocephalic and atraumatic.   Eyes: EOM are normal.   Cardiovascular: Normal rate and regular rhythm.    Pulmonary/Chest: Effort normal.   Abdominal: Soft.   Neurological: She is alert and oriented to person, place, and time.   Psychiatric: She has a normal mood and affect. Her behavior is normal.             Left Knee Exam     Inspection   Erythema: absent  Scars: present (well-healed.  No sign or symptom of infection.)  Swelling: absent  Effusion: absent  Deformity: deformity  Bruising: absent    Tenderness   The patient is experiencing no tenderness.     Range of Motion   Extension: normal   Flexion: normal Left knee flexion: improving.     Tests   Meniscus   Rosalia:  Medial - negative Lateral - negative  Stability Lachman: normal (-1 to 2mm) PCL-Posterior Drawer: normal (0 to 2mm)  MCL - Valgus: normal (0 to 2mm)  LCL - Varus: normal (0 to 2mm)  Patella   Patellar Grind: negative    Other   Muscle Tightness: hamstring tightness  Sensation: normal    Comments:  Calf NT  Good stability on Lauchman today    Muscle Strength   Left Lower Extremity   Quadriceps:  4/5   Hamstrin/5      Vascular Exam       Edema  Left Lower Leg: absent              Assessment:       Encounter Diagnosis   Name Primary?    S/P ACL reconstruction Yes          Plan:       Taryn was seen today for follow-up.    Diagnoses and all orders for this visit:    S/P ACL reconstruction      Ms. Centeno comes in today for routine follow-up on the above problems.  She is improved.  She will continue with her stretching and strengthening exercises on her own.  From a functional standpoint, she is released to resume activities as tolerated.  Certainly, she could damage the ACL graft if she were to re-injure herself.  She will use caution in regard to that.  She has asked to extend the handicap permit due to bilateral leg pain. I have advised her to speak with Dr. Malcolm as there is no reason to extend the handicap permit based on her left knee ACL reconstruction.  She will follow up with me on an as-needed basis.  She verbalizes understanding and agrees.  Follow-up if symptoms worsen or fail to improve.          The patient understands, chooses and consents to this plan and accepts all   the risks which include but are not limited to the risks mentioned above.     Disclaimer: This note was prepared using a voice recognition system and is likely to have sound alike errors within the text.

## 2018-09-11 NOTE — PROGRESS NOTES
Chief Pain Complaint:  Back Pain        History of Present Illness:   Taryn Serrano is a 53 y.o. female  who is presenting with a chief complaint of Back Pain  . The patient began experiencing this problem insidiously, and the pain has been gradually worsening over the past 5 month(s). The pain is described as throbbing, cramping, aching and heavy and is located in the bilateral lumbar spine. Pain is intermittent and lasts hours. The  pain is nonradiating. The patient rates her pain a 5 out of ten and interferes with activities of daily living a 5 out of ten. Pain is exacerbated by extension of the lumbar spine, and is improved by rest. Patient reports no prior trauma, prior left knee surgery     - pertinent negatives: No fever, No chills, No weight loss, No bladder dysfunction, No bowel dysfunction, No saddle anesthesia  - pertinent positives: none    - medications, other therapies tried (physical therapy, injections):     >> NSAIDs and Tylenol    >> Has previously undergone Physical Therapy    >> Has NOT previously undergone spinal injection/s      Imaging / Labs / Studies (reviewed on 9/11/2018):    Results for orders placed during the hospital encounter of 06/07/18   X-Ray Lumbar Spine Complete 5 View    Narrative EXAMINATION:  XR LUMBAR SPINE COMPLETE 5 VIEW    CLINICAL HISTORY:  lbp and BLE pain;Low back pain    TECHNIQUE:  AP, lateral, spot and bilateral oblique views of the lumbar spine were performed.    COMPARISON:  None    FINDINGS:  There is mild grade 1 spondylolisthesis of L4 on L5.  There is prominent facet arthropathy at L4-5 and L5-S1 levels.  Mild disc space narrowing and spondylosis noted at the L2-3 and L3-4 levels.  No pars defects.      Impression As above      Electronically signed by: Edward Harding DO  Date:    06/07/2018  Time:    12:09     Review of Systems:  CONSTITUTIONAL: patient denies any fever, chills, or weight loss  SKIN: patient denies any rash or itching  RESPIRATORY:  "patient denies having any shortness of breath  GASTROINTESTINAL: patient denies having any diarrhea, constipation, or bowel incontinence  GENITOURINARY: patient denies having any abnormal bladder function    MUSCULOSKELETAL:  - patient complains of the above noted pain/s (see chief pain complaint)    NEUROLOGICAL:   - pain as above  - strength in Lower extremities is intact, BILATERALLY  - sensation in Lower extremities is intact, BILATERALLY  - patient denies any loss of bowel or bladder control      PSYCHIATRIC: patient denies any change in mood    Other:  All other systems reviewed and are negative      Physical Exam:  /70 (BP Location: Right arm, Patient Position: Sitting)   Pulse 77   Ht 5' 4" (1.626 m)   Wt 101.6 kg (224 lb)   LMP 01/07/2015   BMI 38.45 kg/m²  (reviewed on 9/11/2018)  General: Alert and oriented, in no apparent distress.  Gait: normal gait.  Skin: No rashes, No discoloration, No obvious lesions  HEENT: Normocephalic, atraumatic. Pupils equal and round.  Cardiovascular: Regular rate and rhythm , no significant peripheral edema present  Respiratory: Without audible wheezing, without use of accessory muscles of respiration.    Musculoskeletal:    Lumbar Spine    - Pain on flexion of lumbar spine Absent  - Straight Leg Raise:  Absent    - Pain on extension of lumbar spine Present  - TTP over the lumbar facet joints Present Bilateral L5-S1  - Lumbar facet loading Present      -Pain on palpation over the SI joint  Absent  - MILENA: Absent    -TTP over medial knee joint R>L    Neuro:    Strength:  LE R/L: HF: 5/5, HE: 5/5, KF: 5/5; KE: 5/5; FE: 5/5; FF: 5/5    Extremity Reflexes: Brisk and symmetric throughout.      Extremity Sensory: Sensation to pinprick and temperature symmetric. Proprioception intact.      Psych:  Mood and affect is appropriate      Assessment:    Taryn Serrano is a 53 y.o. year old female who is presenting with     Encounter Diagnoses   Name Primary?    Lumbar " spondylosis Yes    Lumbar facet arthropathy     Obesity, Class II, BMI 35-39.9, no comorbidity     Chronic pain of both knees        Plan:    1. Interventional: Consider Bilateral L3, L4, L5 MBB if conservative measures fail.    2. Pharmacologic: Tylenol PRN.     3. Rehabilitative: Encouraged PT.    4. Diagnostic: None for now.    5. Follow up: Follow-up if symptoms worsen or fail to improve.      20 minutes were spent in this encounter with more than 50% of the time used for counseling and review of the plan.  Imaging / studies reviewed, detailed above.  I discussed in detail the risks, benefits, and alternatives to any and all potential treatment options.  All questions and concerns were fully addressed today in clinic. Medical decision making moderate.    Thank you for the opportunity to assist in the care of this patient.    Best wishes,    Signed:    Nicholas Malcolm MD          Disclaimer:  This note may have been prepared using voice recognition software, it may have not been extensively proofed, as such there could be errors within the text such as sound alike errors.

## 2018-10-28 ENCOUNTER — PATIENT MESSAGE (OUTPATIENT)
Dept: INTERNAL MEDICINE | Facility: CLINIC | Age: 53
End: 2018-10-28

## 2018-11-05 ENCOUNTER — HOSPITAL ENCOUNTER (OUTPATIENT)
Dept: RADIOLOGY | Facility: HOSPITAL | Age: 53
Discharge: HOME | End: 2018-11-05
Attending: PODIATRIST
Payer: COMMERCIAL

## 2018-11-05 ENCOUNTER — PATIENT MESSAGE (OUTPATIENT)
Dept: PODIATRY | Facility: CLINIC | Age: 53
End: 2018-11-05

## 2018-11-05 ENCOUNTER — OFFICE VISIT (OUTPATIENT)
Dept: PODIATRY | Facility: CLINIC | Age: 53
End: 2018-11-05
Payer: COMMERCIAL

## 2018-11-05 VITALS
DIASTOLIC BLOOD PRESSURE: 75 MMHG | HEIGHT: 64 IN | BODY MASS INDEX: 38.27 KG/M2 | HEART RATE: 65 BPM | SYSTOLIC BLOOD PRESSURE: 109 MMHG | WEIGHT: 224.19 LBS

## 2018-11-05 DIAGNOSIS — M21.6X1 ACQUIRED BILATERAL PES CAVUS: ICD-10-CM

## 2018-11-05 DIAGNOSIS — M79.672 BILATERAL FOOT PAIN: Primary | ICD-10-CM

## 2018-11-05 DIAGNOSIS — M79.672 BILATERAL FOOT PAIN: ICD-10-CM

## 2018-11-05 DIAGNOSIS — M77.41 METATARSALGIA OF BOTH FEET: ICD-10-CM

## 2018-11-05 DIAGNOSIS — M20.41 HAMMERTOES OF BOTH FEET: Primary | ICD-10-CM

## 2018-11-05 DIAGNOSIS — M20.12 HALLUX ABDUCTO VALGUS, BILATERAL: ICD-10-CM

## 2018-11-05 DIAGNOSIS — M20.42 HAMMERTOES OF BOTH FEET: Primary | ICD-10-CM

## 2018-11-05 DIAGNOSIS — M21.6X2 ACQUIRED BILATERAL PES CAVUS: ICD-10-CM

## 2018-11-05 DIAGNOSIS — M79.671 BILATERAL FOOT PAIN: ICD-10-CM

## 2018-11-05 DIAGNOSIS — M77.42 METATARSALGIA OF BOTH FEET: ICD-10-CM

## 2018-11-05 DIAGNOSIS — M79.671 BILATERAL FOOT PAIN: Primary | ICD-10-CM

## 2018-11-05 DIAGNOSIS — M25.372 ANKLE INSTABILITY, LEFT: ICD-10-CM

## 2018-11-05 DIAGNOSIS — R29.6 FREQUENT FALLS: ICD-10-CM

## 2018-11-05 DIAGNOSIS — M20.11 HALLUX ABDUCTO VALGUS, BILATERAL: ICD-10-CM

## 2018-11-05 PROCEDURE — 99214 OFFICE O/P EST MOD 30 MIN: CPT | Mod: S$GLB,,, | Performed by: PODIATRIST

## 2018-11-05 PROCEDURE — 3008F BODY MASS INDEX DOCD: CPT | Mod: CPTII,S$GLB,, | Performed by: PODIATRIST

## 2018-11-05 PROCEDURE — 99999 PR PBB SHADOW E&M-EST. PATIENT-LVL III: CPT | Mod: PBBFAC,,, | Performed by: PODIATRIST

## 2018-11-05 PROCEDURE — 73630 X-RAY EXAM OF FOOT: CPT | Mod: 50,TC,FY,PO

## 2018-11-05 PROCEDURE — 73630 X-RAY EXAM OF FOOT: CPT | Mod: 26,50,, | Performed by: RADIOLOGY

## 2018-11-05 RX ORDER — MELOXICAM 15 MG/1
TABLET ORAL
Qty: 30 TABLET | Refills: 1 | Status: SHIPPED | OUTPATIENT
Start: 2018-11-05 | End: 2018-12-29 | Stop reason: SDUPTHER

## 2018-11-05 RX ORDER — LEVOTHYROXINE SODIUM 112 UG/1
112 TABLET ORAL DAILY
Qty: 90 TABLET | Refills: 3 | Status: SHIPPED | OUTPATIENT
Start: 2018-11-05 | End: 2019-10-19 | Stop reason: SDUPTHER

## 2018-11-05 NOTE — PROGRESS NOTES
PODIATRIC MEDICINE AND SURGERY  11/5/2018    PCP: Dr. Alyce Pardo MD    CHIEF COMPLAINT   Chief Complaint   Patient presents with    Foot Problem     Pt complains of hammer toes b/l, bunions b/l halluces and high arches. Pt states that she does have mainly in the left foot, rates pain 3/10       HPI:    Taryn Serrano is a 53 y.o. female who has a past medical history of Anxiety and depression, Asthma, exercise induced, Dry eyes, History of ADHD, Hypothyroid, URBAN (obstructive sleep apnea), and Vitamin D deficiency.   Taryn presents to clinic today complaining of bilateral foot pain. Pt points to ball of her feet. She also admits to gait/ankle instability primarily LLE.  States symptoms have been present for 1 year since knee surgery. She does admit to tingling in ears. She has always had instabiltiy in both ankles. Notes frequent falls and weak balance worsening over past year.   She did have ORIF R ankle (Dr. Hemphill) in 2015. She denies any problems at that site.  Pain to ball of feet 3/10. She has purchased good feet orthotics in the past which did help some.    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     PMH  Past Medical History:   Diagnosis Date    Anxiety and depression     Asthma, exercise induced     Dry eyes     History of ADHD     Hypothyroid     URBAN (obstructive sleep apnea)     Vitamin D deficiency        PROBLEM LIST  Patient Active Problem List    Diagnosis Date Noted    CKD (chronic kidney disease) stage 3, GFR 30-59 ml/min 08/17/2018    Pure hypercholesterolemia 08/11/2017    Synovial cyst of left popliteal space 05/15/2017    Left ACL tear 05/15/2017    Tear of medial meniscus of left knee 05/15/2017    Obesity, Class II, BMI 35-39.9, no comorbidity 03/24/2017    Pseudophakia of both eyes 05/21/2014    Dry eye 05/21/2014    PCO (posterior capsular opacification) 05/21/2014    Asthma, exercise induced     URBAN  (obstructive sleep apnea)     Acquired hypothyroidism     Anxiety and depression     Vitamin D deficiency     Circadian rhythm sleep disorder, irregular sleep-wake type 03/01/2011    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 11/16/2010    Periodic limb movement disorder 10/22/2010    Paving stone degeneration of peripheral retina 02/08/2010       MEDS  Current Outpatient Medications on File Prior to Visit   Medication Sig Dispense Refill    albuterol (VENTOLIN HFA) 90 mcg/actuation inhaler Inhale 1-2 puffs into the lungs every 6 (six) hours as needed for Wheezing. 18 g 0    buPROPion (WELLBUTRIN XL) 150 MG TB24 tablet TAKE 1 TABLET (150 MG TOTAL) BY MOUTH ONCE DAILY. 30 tablet 9    cholecalciferol, vitamin D3, 2,000 unit Cap Take 1 capsule (2,000 Units total) by mouth once daily. 100 capsule 6    esomeprazole (NEXIUM) 40 MG capsule Take 1 capsule (40 mg total) by mouth before breakfast. daily PRN 30 capsule 6    FLUoxetine (PROZAC) 40 MG capsule TAKE 2 CAPSULES (80 MG TOTAL) BY MOUTH ONCE DAILY. 60 capsule 6    fluticasone (FLONASE) 50 mcg/actuation nasal spray 2 sprays (100 mcg total) by Each Nare route once daily. 1 Bottle 3    levothyroxine (SYNTHROID) 112 MCG tablet TAKE 1 TABLET (112 MCG TOTAL) BY MOUTH ONCE DAILY. 30 tablet 11    turmeric root extract 500 mg Cap Take 1,000 capsules by mouth once daily. 100 capsule 6    fluticasone-salmeterol 500-50 mcg/dose (ADVAIR DISKUS) 500-50 mcg/dose DsDv diskus inhaler Inhale 1 puff into the lungs 2 (two) times daily. 60 each 6    [DISCONTINUED] meloxicam (MOBIC) 15 MG tablet Take 1 tablet (15 mg total) by mouth once daily. Take with foodl 30 tablet 0     Current Facility-Administered Medications on File Prior to Visit   Medication Dose Route Frequency Provider Last Rate Last Dose    ondansetron HCl (PF) 4 mg/2 mL injection    PRN Laci Smalls CRNA   4 mg at 07/07/15 0846          Medication List           Accurate as of 11/5/18   4:02 PM. If you have any questions, ask your nurse or doctor.               CHANGE how you take these medications    meloxicam 15 MG tablet  Commonly known as:  MOBIC  Take one pill daily as needed for pain  What changed:    · how much to take  · how to take this  · when to take this  · additional instructions  Changed by:  Magaly Hemphill DPM        CONTINUE taking these medications    albuterol 90 mcg/actuation inhaler  Commonly known as:  VENTOLIN HFA  Inhale 1-2 puffs into the lungs every 6 (six) hours as needed for Wheezing.     buPROPion 150 MG TB24 tablet  Commonly known as:  WELLBUTRIN XL  TAKE 1 TABLET (150 MG TOTAL) BY MOUTH ONCE DAILY.     cholecalciferol (vitamin D3) 2,000 unit Cap  Commonly known as:  VITAMIN D3  Take 1 capsule (2,000 Units total) by mouth once daily.     esomeprazole 40 MG capsule  Commonly known as:  NexIUM  Take 1 capsule (40 mg total) by mouth before breakfast. daily PRN     FLUoxetine 40 MG capsule  Commonly known as:  PROZAC  TAKE 2 CAPSULES (80 MG TOTAL) BY MOUTH ONCE DAILY.     fluticasone 50 mcg/actuation nasal spray  Commonly known as:  FLONASE  2 sprays (100 mcg total) by Each Nare route once daily.     fluticasone-salmeterol 500-50 mcg/dose 500-50 mcg/dose Dsdv diskus inhaler  Commonly known as:  ADVAIR DISKUS  Inhale 1 puff into the lungs 2 (two) times daily.     levothyroxine 112 MCG tablet  Commonly known as:  SYNTHROID  TAKE 1 TABLET (112 MCG TOTAL) BY MOUTH ONCE DAILY.     turmeric root extract 500 mg Cap  Take 1,000 capsules by mouth once daily.           Where to Get Your Medications      These medications were sent to [facility]/pharmacy #[id number] - [place] - [address] [facility]  [address] [address]    Phone:  [phone]   · meloxicam 15 MG tablet         [facility]     Past Surgical History:   Procedure Laterality Date    ANKLE SURGERY Right     ARTHROSCOPY-MENISCECTOMY Left 12/14/2017    Performed by [facility]aly [facility] at [facility] OR    CARPAL TUNNEL  RELEASE Bilateral     CATARACT EXTRACTION BILATERAL W/ ANTERIOR VITRECTOMY      CATARACT EXTRACTION W/  INTRAOCULAR LENS IMPLANT  OU    KNEE SURGERY      right    OPEN REDUCTION INTERNAL FIXATION-ANKLE Right 7/7/2015    Performed by Mavis [facility]arez DPM at [facility] OR    PLACEMENT-GRAFT Left 12/14/2017    Per[facility] by [facility] at [facility] OR    RECONSTRUCTION-LIGAMENT ANTERIOR CRUCIATE-ARTHROSCOPIC Left 12/14/2017    Performed by [facility]an [facility]aly MD at [facility] OR        ALL  Review of patient's allergies indicates:   Allergen Reactions    Grass pollen-perennial rye, standard Shortness Of Breath    Horsetail (equisetum arvense) Shortness Of Breath    Mold extracts Shortness Of Breath       SOC     Social History     Tobacco Use    Smoking status: Never Smoker    Smokeless tobacco: Never Used   Substance Use Topics    Alcohol use: Yes     Comment: social    Drug use: No         FAMILY HX    Family History   Problem Relation Age of Onset    Cataracts Father     Alzheimer's disease Father     Colon cancer Father     Heart failure Mother     Melanoma Neg Hx     Psoriasis Neg Hx     Lupus Neg Hx     Eczema Neg Hx     Acne Neg Hx     Breast cancer Neg Hx     Ovarian cancer Neg Hx     Thrombophilia Neg Hx             REVIEW OF SYSTEMS  General: This patient is well-developed, well-nourished and appears stated age, well-oriented to person, place and time, and cooperative and pleasant on today's visit   Constitutional: Negative for chills and fever.   Respiratory: Negative for shortness of breath.    Cardiovascular: Negative for chest pain, palpitations, orthopnea  Gastrointestinal: Negative for diarrhea, nausea and vomiting.   Musculoskeletal: Positive for above noted in HPI  Skin: Negative for rash, skin, or nail changes   Neurological: Negative for tingling and sensory changes  Peripheral Vascular: no claudication or cyanosis  Psychiatric/Behavioral: Negative for altered mental status     PHYSICAL  "EXAM:      Vitals:    11/05/18 0921   BP: 109/75   Pulse: 65   Weight: 101.7 kg (224 lb 3.3 oz)   Height: 5' 4" (1.626 m)         LOWER EXTREMITY PHYSICAL EXAM  VASCULAR  Dorsalis pedis and posterior tibial pulses palpable 2/4 bilaterally. Capillary refill time immediate to the toes. Feet are warm to the touch. Skin temperature warm to warm from proximally to distally There are varicosities, telangiectasias noted to bilateral foot and ankle regions. There are no ecchymoses noted to bilateral foot and ankle regions. There is no gross lower extremity edema.    DERMATOLOGIC  Skin moist with healthy texture and turgor.There are no open ulcerations, lacerations, or fissures to bilateral foot and ankle regions. There are no signs of infection as there is no erythema, no proximal-extending lymphangiitis, no fluctuance, or crepitus noted on palpation to bilateral foot and ankle regions. There is no interdigital maceration.   There are hyperkeratotic lesions noted sub metatarsal heads 1-5;  noted to feet. Nails are well-trimmed.    NEUROLOGIC  Epicritic sensation is intact as the patient is able to sense light touch to bilateral foot and ankle regions. Achilles and patellar deep tendon reflexes intact. Babinski reflex absent    ORTHOPEDIC/BIOMECHANICAL  Muscle strength AT/EHL/EDL/PT: 5/5; Achilles/Gastroc/Soleus: 5/5; PB/PL: 5/5 Muscle tone is normal. Ankle joint ROM non painful with DF/PF, non-crepitus; STJ ROM  Inv/ev non painful, non crepitus ; MTPJ b/l  DF/PF, non crepitus ; Foot type: cavus foot  There are semi rigid digital contaractures 2-4 b/l, diffuse TTP ball of foot metatarsal heads 1-5; There are HAV defomirites noted  Plantarflexed forefoot ; there is negative pain with diffuse palpation b/l ankle; there is no subluxing peroneal tendons; there is negative pain with palpation of LCL ligaments    IMAGING  Imaging ordered    ASSESSMENT     Encounter Diagnoses   Name Primary?    Hammertoes of both feet Yes    " Hallux abducto valgus, bilateral     Acquired bilateral pes cavus     Metatarsalgia of both feet     Ankle instability, left          PLAN  Patient was educated about clinical and imaging findings, and verbalizes understanding of above.     Diagnoses and all orders for this visit:  Hammertoes of both feet    Hallux abducto valgus, bilateral    Acquired bilateral pes cavus    Metatarsalgia of both feet    Ankle instability, left    Other orders  -     meloxicam (MOBIC) 15 MG tablet; Take one pill daily as needed for pain  Dispense: 30 tablet; Refill: 1       I discussed treatment for Metarsalgia which is due to inflammation or irritation at ball of foot. Discussed biomechanical impact with higher arched foot. I discussed the need to offload the foot to reduce the pressure on ball of foot with use of orthotic- pressure relief, cushioned tennis shoes, rocker bottom- examples provided to patient such as 's Shape Ups. An offloading metatarsal pad was also recommended to help redistribute forces along the ball of the foot and decrease inflammation. Rx orthotics provided. Will order WB xrays for further evaluation. Discussed hammertoe contractures, HAV. Shoe modifications and conservative treatment first. In regards to ankle instability, Will initiate physical therapy for proprioception and strengthening.     Recommend ENT if symptoms of vertigo or tinnitus. Can contribute to increased falls.      Disclaimer:  This note may have been prepared using voice recognition software, it may have not been extensively proofed, as such there could be errors within the text such as sound alike errors.         Future Appointments   Date Time Provider Department Center   11/5/2018  5:45 PM [facility] XR2 [facility] XRAY [place]   12/4/2018 10:00 AM Magaly Hemphill DPM [facility] POD [place]   2/22/2019  7:35 AM LABORATORY, [facility] LAB [place]   3/1/2019  8:20 AM Alyce Schroeder MD [facility] IM [place]   8/27/2019  9:40 AM Elizabeth Lejeune, NP  [facility] SLEEP [place]       Report Electronically Signed By:     Magaly Hemphill DPM   Podiatry  [facility]  11/5/2018

## 2018-12-17 ENCOUNTER — OFFICE VISIT (OUTPATIENT)
Dept: PODIATRY | Facility: CLINIC | Age: 53
End: 2018-12-17
Payer: COMMERCIAL

## 2018-12-17 VITALS
BODY MASS INDEX: 38.27 KG/M2 | HEART RATE: 65 BPM | DIASTOLIC BLOOD PRESSURE: 79 MMHG | SYSTOLIC BLOOD PRESSURE: 118 MMHG | HEIGHT: 64 IN | WEIGHT: 224.19 LBS

## 2018-12-17 DIAGNOSIS — M77.42 METATARSALGIA OF BOTH FEET: Primary | ICD-10-CM

## 2018-12-17 DIAGNOSIS — M79.672 BILATERAL FOOT PAIN: ICD-10-CM

## 2018-12-17 DIAGNOSIS — M20.42 HAMMERTOES OF BOTH FEET: ICD-10-CM

## 2018-12-17 DIAGNOSIS — M20.41 HAMMERTOES OF BOTH FEET: ICD-10-CM

## 2018-12-17 DIAGNOSIS — M77.41 METATARSALGIA OF BOTH FEET: Primary | ICD-10-CM

## 2018-12-17 DIAGNOSIS — M79.671 BILATERAL FOOT PAIN: ICD-10-CM

## 2018-12-17 PROCEDURE — 99999 PR PBB SHADOW E&M-EST. PATIENT-LVL III: CPT | Mod: PBBFAC,,, | Performed by: PODIATRIST

## 2018-12-17 PROCEDURE — 99213 OFFICE O/P EST LOW 20 MIN: CPT | Mod: S$GLB,,, | Performed by: PODIATRIST

## 2018-12-17 PROCEDURE — 3008F BODY MASS INDEX DOCD: CPT | Mod: CPTII,S$GLB,, | Performed by: PODIATRIST

## 2018-12-17 NOTE — PROGRESS NOTES
PODIATRIC MEDICINE AND SURGERY  12/17/2018    PCP: Dr. Alyce Pardo MD    CHIEF COMPLAINT   Chief Complaint   Patient presents with    Follow-up     F/u b/l estefany and review of xray. Pt states that she has improved but rates pain 2/10       HPI:    Taryn Serrano is a 53 y.o. female who has a past medical history of Anxiety and depression, Asthma, exercise induced, Dry eyes, History of ADHD, Hypothyroid, URBAN (obstructive sleep apnea), and Vitamin D deficiency.   Taryn presents to clinic today complaining of bilateral foot pain. Pt points to ball of her feet. She also admits to gait/ankle instability primarily LLE.  States symptoms have been present for 1 year since knee surgery. She does admit to tingling in ears. She has always had instabiltiy in both ankles. Notes frequent falls and weak balance worsening over past year.   She did have ORIF R ankle (Dr. Hemphill) in 2015. She denies any problems at that site.  Pain to ball of feet 3/10. She has purchased good feet orthotics in the past which did help some.    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     12/17/2018  Pt is here today for follow up. States she is taking Move free ultra (OTC) which is helping with symptoms. She is wearing orthotics and new balances daily. Symptoms have improved.     PMH  Past Medical History:   Diagnosis Date    Anxiety and depression     Asthma, exercise induced     Dry eyes     History of ADHD     Hypothyroid     URBAN (obstructive sleep apnea)     Vitamin D deficiency        PROBLEM LIST  Patient Active Problem List    Diagnosis Date Noted    CKD (chronic kidney disease) stage 3, GFR 30-59 ml/min 08/17/2018    Pure hypercholesterolemia 08/11/2017    Synovial cyst of left popliteal space 05/15/2017    Left ACL tear 05/15/2017    Tear of medial meniscus of left knee 05/15/2017    Obesity, Class II, BMI 35-39.9, no comorbidity 03/24/2017     Pseudophakia of both eyes 05/21/2014    Dry eye 05/21/2014    PCO (posterior capsular opacification) 05/21/2014    Asthma, exercise induced     URBAN (obstructive sleep apnea)     Acquired hypothyroidism     Anxiety and depression     Vitamin D deficiency     Circadian rhythm sleep disorder, irregular sleep-wake type 03/01/2011    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 11/16/2010    Periodic limb movement disorder 10/22/2010    Paving stone degeneration of peripheral retina 02/08/2010       MEDS  Current Outpatient Medications on File Prior to Visit   Medication Sig Dispense Refill    albuterol (VENTOLIN HFA) 90 mcg/actuation inhaler Inhale 1-2 puffs into the lungs every 6 (six) hours as needed for Wheezing. 18 g 0    buPROPion (WELLBUTRIN XL) 150 MG TB24 tablet TAKE 1 TABLET (150 MG TOTAL) BY MOUTH ONCE DAILY. 30 tablet 9    cholecalciferol, vitamin D3, 2,000 unit Cap Take 1 capsule (2,000 Units total) by mouth once daily. 100 capsule 6    esomeprazole (NEXIUM) 40 MG capsule Take 1 capsule (40 mg total) by mouth before breakfast. daily PRN 30 capsule 6    FLUoxetine (PROZAC) 40 MG capsule TAKE 2 CAPSULES (80 MG TOTAL) BY MOUTH ONCE DAILY. 60 capsule 6    fluticasone (FLONASE) 50 mcg/actuation nasal spray 2 sprays (100 mcg total) by Each Nare route once daily. 1 Bottle 3    fluticasone-salmeterol 500-50 mcg/dose (ADVAIR DISKUS) 500-50 mcg/dose DsDv diskus inhaler Inhale 1 puff into the lungs 2 (two) times daily. 60 each 6    levothyroxine (SYNTHROID) 112 MCG tablet Take 1 tablet (112 mcg total) by mouth once daily. 90 tablet 3    meloxicam (MOBIC) 15 MG tablet Take one pill daily as needed for pain 30 tablet 1    turmeric root extract 500 mg Cap Take 1,000 capsules by mouth once daily. 100 capsule 6     Current Facility-Administered Medications on File Prior to Visit   Medication Dose Route Frequency Provider Last Rate Last Dose    ondansetron HCl (PF) 4 mg/2 mL injection     PRN Laci Smalls CRNA   4 mg at 07/07/15 0846          Medication List           Accurate as of 12/17/18  1:56 PM. If you have any questions, ask your nurse or doctor.               CONTINUE taking these medications    albuterol 90 mcg/actuation inhaler  Commonly known as:  VENTOLIN HFA  Inhale 1-2 puffs into the lungs every 6 (six) hours as needed for Wheezing.     buPROPion 150 MG TB24 tablet  Commonly known as:  WELLBUTRIN XL  TAKE 1 TABLET (150 MG TOTAL) BY MOUTH ONCE DAILY.     cholecalciferol (vitamin D3) 2,000 unit Cap  Commonly known as:  VITAMIN D3  Take 1 capsule (2,000 Units total) by mouth once daily.     esomeprazole 40 MG capsule  Commonly known as:  NexIUM  Take 1 capsule (40 mg total) by mouth before breakfast. daily PRN     FLUoxetine 40 MG capsule  TAKE 2 CAPSULES (80 MG TOTAL) BY MOUTH ONCE DAILY.     fluticasone 50 mcg/actuation nasal spray  Commonly known as:  FLONASE  2 sprays (100 mcg total) by Each Nare route once daily.     fluticasone-salmeterol 500-50 mcg/dose 500-50 mcg/dose Dsdv diskus inhaler  Commonly known as:  ADVAIR DISKUS  Inhale 1 puff into the lungs 2 (two) times daily.     levothyroxine 112 MCG tablet  Commonly known as:  SYNTHROID  Take 1 tablet (112 mcg total) by mouth once daily.     meloxicam 15 MG tablet  Commonly known as:  MOBIC  Take one pill daily as needed for pain     turmeric root extract 500 mg Cap  Take 1,000 capsules by mouth once daily.            PSH     Past Surgical History:   Procedure Laterality Date    ANKLE SURGERY Right     ARTHROSCOPY-MENISCECTOMY Left 12/14/2017    [facility] OR    CARPAL TUNNEL RELEASE Bilateral     CATARACT EXTRACTION BILATERAL W/ ANTERIOR VITRECTOMY      CATARACT EXTRACTION W/  INTRAOCULAR LENS IMPLANT  OU    KNEE SURGERY      right    OPEN REDUCTION INTERNAL FIXATION-ANKLE Right 7/7/2015    [facility]arez [facility] at [facility] OR    PLACEMENT-GRAFT Left 12/14/2017    Performed by Kenan TINOCO  "[facility] at [facility] OR    RECONSTRUCTION-LIGAMENT ANTERIOR CRUCIATE-ARTHROSCOPIC Left 12/14/2017    Performed by [facility]aly [facility] at [facility] OR        ALL  Review of patient's allergies indicates:   Allergen Reactions    Grass pollen-perennial rye, standard Shortness Of Breath    Horsetail (equisetum arvense) Shortness Of Breath    Mold extracts Shortness Of Breath       SOC     Social History     Tobacco Use    Smoking status: Never Smoker    Smokeless tobacco: Never Used   Substance Use Topics    Alcohol use: Yes     Comment: social    Drug use: No         FAMILY HX    Family History   Problem Relation Age of Onset    Cataracts Father     Alzheimer's disease Father     Colon cancer Father     Heart failure Mother     Melanoma Neg Hx     Psoriasis Neg Hx     Lupus Neg Hx     Eczema Neg Hx     Acne Neg Hx     Breast cancer Neg Hx     Ovarian cancer Neg Hx     Thrombophilia Neg Hx             REVIEW OF SYSTEMS  General: This patient is well-developed, well-nourished and appears stated age, well-oriented to person, place and time, and cooperative and pleasant on today's visit   Constitutional: Negative for chills and fever.   Respiratory: Negative for shortness of breath.    Cardiovascular: Negative for chest pain, palpitations, orthopnea  Gastrointestinal: Negative for diarrhea, nausea and vomiting.   Musculoskeletal: Positive for above noted in HPI  Skin: Negative for rash, skin, or nail changes   Neurological: Negative for tingling and sensory changes  Peripheral Vascular: no claudication or cyanosis  Psychiatric/Behavioral: Negative for altered mental status     PHYSICAL EXAM:      Vitals:    12/17/18 1111   BP: 118/79   Pulse: 65   Weight: 101.7 kg (224 lb 3.3 oz)   Height: 5' 4" (1.626 m)         LOWER EXTREMITY PHYSICAL EXAM  VASCULAR  Dorsalis pedis and posterior tibial pulses palpable 2/4 bilaterally. Capillary refill time immediate to the toes. Feet are warm to the touch. Skin temperature " warm to warm from proximally to distally There are varicosities, telangiectasias noted to bilateral foot and ankle regions. There are no ecchymoses noted to bilateral foot and ankle regions. There is no gross lower extremity edema.    DERMATOLOGIC  Skin moist with healthy texture and turgor.There are no open ulcerations, lacerations, or fissures to bilateral foot and ankle regions. There are no signs of infection as there is no erythema, no proximal-extending lymphangiitis, no fluctuance, or crepitus noted on palpation to bilateral foot and ankle regions. There is no interdigital maceration.   There are hyperkeratotic lesions noted sub metatarsal heads 1-5;  noted to feet. Nails are well-trimmed.    NEUROLOGIC  Epicritic sensation is intact as the patient is able to sense light touch to bilateral foot and ankle regions. Achilles and patellar deep tendon reflexes intact. Babinski reflex absent    ORTHOPEDIC/BIOMECHANICAL  Muscle strength AT/EHL/EDL/PT: 5/5; Achilles/Gastroc/Soleus: 5/5; PB/PL: 5/5 Muscle tone is normal. Ankle joint ROM non painful with DF/PF, non-crepitus; STJ ROM  Inv/ev non painful, non crepitus ; MTPJ b/l  DF/PF, non crepitus ; Foot type: cavus foot  There are semi rigid digital contaractures 2-4 b/l, diffuse TTP ball of foot metatarsal heads 1-5; There are HAV defomirites noted; there is splaying of digits 3, 4 LEFT foot.  Plantarflexed forefoot ; there is negative pain with diffuse palpation b/l ankle; there is no subluxing peroneal tendons; there is negative pain with palpation of LCL ligaments    IMAGING  FINDINGS:  Right: Postoperative findings noted involving the distal fibula.  No acute fractures or dislocations visualized in the foot.  There is moderate hallux valgus deformity with mild associated degenerative changes at the great toe MTP joint.  Mild associated soft tissue prominence at the 1st metatarsal head.  No erosive changes demonstrated.  Suggestion of possible flexion extension  deformity involving multiple toes, correlate clinically.    Left: No acute fractures or dislocations visualized.  There is moderate hallux valgus deformity with mild associated degenerative changes at the great toe MTP joint.  There is overlying soft tissue prominence at the level of the 1st metatarsal head.No erosive changes demonstrated.  There is suggestion of flexion/extension deformities involving multiple toes, correlate clinically.    ASSESSMENT     Encounter Diagnoses   Name Primary?    Metatarsalgia of both feet Yes    Hammertoes of both feet     Bilateral foot pain          PLAN  Patient was educated about clinical and imaging findings, and verbalizes understanding of above.     Diagnoses and all orders for this visit:  Metatarsalgia of both feet    Hammertoes of both feet    Bilateral foot pain       I discussed treatment for Metarsalgia which is due to inflammation or irritation at ball of foot. Discussed biomechanical impact with higher arched foot. I discussed the need to offload the foot to reduce the pressure on ball of foot with use of orthotic- pressure relief, cushioned tennis shoes, rocker bottom- examples provided to patient such as 's Shape Ups. Continue with offloading metatarsal pads and shoe inserts which are providing relief. Conservative management.     Disclaimer:  This note may have been prepared using voice recognition software, it may have not been extensively proofed, as such there could be errors within the text such as sound alike errors.         Future Appointments   Date Time Provider Department Center   12/17/2018  3:40 PM Magaly Hemphill DPM [facility] POD [place]   2/22/2019  7[facility] [place]   3/1/2019  8:20 AM Alyce Schroeder MD [facility] IM [place]   8/27/2019  9:40 AM Elizabeth Lejeune, NP [facility] SLEEP [place]       Report Electronically Signed By:     Magaly Hemphill DPM   Podiatry  [facility]   12/17/2018

## 2018-12-20 DIAGNOSIS — H65.93 FLUID LEVEL BEHIND TYMPANIC MEMBRANE OF BOTH EARS: ICD-10-CM

## 2018-12-20 DIAGNOSIS — J45.990 ASTHMA, EXERCISE INDUCED: ICD-10-CM

## 2018-12-20 RX ORDER — ALBUTEROL SULFATE 90 UG/1
1-2 AEROSOL, METERED RESPIRATORY (INHALATION) EVERY 6 HOURS PRN
Qty: 18 G | Refills: 0 | Status: SHIPPED | OUTPATIENT
Start: 2018-12-20 | End: 2019-01-16 | Stop reason: SDUPTHER

## 2018-12-20 RX ORDER — FLUTICASONE PROPIONATE 50 MCG
2 SPRAY, SUSPENSION (ML) NASAL DAILY
Qty: 1 BOTTLE | Refills: 3 | Status: SHIPPED | OUTPATIENT
Start: 2018-12-20 | End: 2021-06-10

## 2019-01-03 RX ORDER — MELOXICAM 15 MG/1
TABLET ORAL
Qty: 30 TABLET | Refills: 1 | Status: SHIPPED | OUTPATIENT
Start: 2019-01-03 | End: 2021-08-18

## 2019-01-16 DIAGNOSIS — J45.990 ASTHMA, EXERCISE INDUCED: ICD-10-CM

## 2019-01-16 RX ORDER — ALBUTEROL SULFATE 90 UG/1
AEROSOL, METERED RESPIRATORY (INHALATION)
Qty: 18 INHALER | Refills: 6 | Status: SHIPPED | OUTPATIENT
Start: 2019-01-16 | End: 2020-03-19 | Stop reason: SDUPTHER

## 2019-01-26 NOTE — PROGRESS NOTES
PHYSICAL THERAPY DAILY NOTE    Referring Provider:  Yeny Rahman    Diagnosis:       ICD-10-CM ICD-9-CM    1. S/P ACL reconstruction Z98.890 V45.89      Orders:  Evaluate and Treat    Date of Initial Evaluation: 12/21/17 (re-eval needed for 2/23/18)    Visit # 12 of 12.    BACKGROUND:  Patient reports that approximately 1 year ago is when she initially tore her ACL. She had been performing prehab for several months until she was finally able to undergo surgery on 12/14/17. She underwent an allograft and has Wbing precautions at 40%. She reports her pain as not been too bad, but she is noticing some swelling.      SUBJECTIVE: Patient reports that she has been feeling pretty good and PA told her WBAT.    OBJECTIVE:          AT EVAL       TODAY  Gait: Antalgic to L side, decreased weight shift, decreased heel to toe    Sensation: intact to light touch     Knee AROM:  Flexion      120      Extension    0  Knee PROM  Flexion      125      Extension    -2        Strength:  Quadriceps    4      Hamstrings    4     Gluteus Medius   3+      Gluteus Jaskaran   3+     Hip Flexors    4 (noted slight quad lag with SLR)            Function:  LEFS 55% disability on initial evaluation. Patient scored a 43.75% on re-evaluation on the LEFS.    Tenderness to palpation:  Slight tenderness to L distal incision    ASSESSMENT:  Patient demonstrated good tolerance to more SL there ex focus and SL balance without any increase in pain.    TREATMENT PROVIDED:  -Manual Therapy:  3 min  Patellar mobilizations  STM to posterior knee  -Therapeutic Exercise:  35 min  Scifit x 3 min  Heels slides  LE bike x 5 min  Standing balance on bosu ball eyes open  Standing gastroc stretch  Step ups  Step downs  Lateral lunges  Mini wall squats  - Gait: 8 min  Forward walking on treadmill 6 min with increased speed  Retro walking 3 min  -Modalities: 15 min ice + TENS to L knee with prolonged knee extension  -Education: 5 min: on condition and  HEP    PLAN:  Patient will benefit from physical therapy (2-3) x/week for (8-12) weeks including manual therapy, therapeutic exercise, functional activities, modalities, and patient education.    Thank you for this referral.    These services are reasonable and necessary for the conditions set forth above while under my care.    Yudi Mercado, PT, DPT   [phone]

## 2019-02-15 NOTE — PROGRESS NOTES
"Subjective:      Patient ID: Taryn Serrano is a 53 y.o. female.    Chief Complaint: Follow-up (6 month)      HPI  Here for f/u medical problems and preventive exam.  Sister passed away 2 months ago.  Having some breakthrough anxiety.  Not exercising.  Sleeping ok.  Doing well on CPAP.  No f/c/sw/cough.  No cp/sob/palp.  SOB if walking up 2-3 flights of stairs.  BMs and urine normal.  Taking vit D supplement BID.  Has sores on scalp and right external nare that wont heal.    HM: 10/18 fluvax, 3/19 today HAV, 1/15 [id number], 10/13 [id number] booster, 3/19 today TDaP, 3/18 MMG/ Gyn Dr. Alfonso, 1/16 BMD low fx risk rep 5y, 7/12 Cscope rep 10y, 2/17 HCV neg.     Review of Systems   Constitutional: Negative for activity change, appetite change, chills, diaphoresis, fever and unexpected weight change.   HENT: Negative for congestion, ear pain, hearing loss, rhinorrhea, sinus pressure, sore throat and trouble swallowing.    Eyes: Negative for discharge and visual disturbance.   Respiratory: Negative for cough, chest tightness, shortness of breath and wheezing.    Cardiovascular: Negative for chest pain and palpitations.   Gastrointestinal: Negative for blood in stool, constipation, diarrhea, nausea and vomiting.   Endocrine: Negative for polydipsia and polyuria.   Genitourinary: Negative for difficulty urinating, dysuria, frequency, hematuria, menstrual problem, urgency and vaginal discharge.   Musculoskeletal: Positive for arthralgias. Negative for joint swelling and neck pain.   Skin: Negative for rash.   Neurological: Negative for dizziness, weakness and headaches.   Psychiatric/Behavioral: Positive for dysphoric mood. Negative for confusion and sleep disturbance. The patient is not nervous/anxious.          Objective:   /68 (BP Location: Right arm, Patient Position: Sitting, BP Method: Large (Manual))   Pulse 79   Temp 97.6 °F (36.4 °C) (Tympanic)   Ht 5' 4" (1.626 m)   Wt 102.8 kg (226 lb 10.1 oz)   LMP " 01/07/2015   SpO2 99%   BMI 38.90 kg/m²     Physical Exam   Constitutional: She is oriented to person, place, and time. She appears well-developed and well-nourished.   HENT:   Right Ear: External ear normal. Tympanic membrane is not injected.   Left Ear: External ear normal. Tympanic membrane is not injected.   Mouth/Throat: Oropharynx is clear and moist.   Eyes: Conjunctivae are normal.   Neck: Normal range of motion. Neck supple. No thyromegaly present.   Cardiovascular: Normal rate, regular rhythm and intact distal pulses. Exam reveals no gallop and no friction rub.   No murmur heard.  Pulmonary/Chest: Effort normal and breath sounds normal. She has no wheezes. She has no rales.   Abdominal: Soft. Bowel sounds are normal. She exhibits no mass. There is no tenderness.   Musculoskeletal: She exhibits no edema.   Lymphadenopathy:     She has no cervical adenopathy.   Neurological: She is alert and oriented to person, place, and time.   Skin: Skin is warm. No rash noted.   Psychiatric: She has a normal mood and affect.       Results for orders placed or performed in visit on 02/22/19   CBC auto differential   Result Value Ref Range    WBC 5.66 3.90 - 12.70 K/uL    RBC 5.00 4.00 - 5.40 M/uL    Hemoglobin 14.3 12.0 - 16.0 g/dL    Hematocrit 43.4 37.0 - 48.5 %    MCV 87 82 - 98 fL    MCH 28.6 27.0 - 31.0 pg    MCHC 32.9 32.0 - 36.0 g/dL    RDW 13.4 11.5 - 14.5 %    Platelets 207 150 - 350 K/uL    MPV 9.7 9.2 - 12.9 fL    Immature Granulocytes 0.4 0.0 - 0.5 %    Gran # (ANC) 3.7 1.8 - 7.7 K/uL    Immature Grans (Abs) 0.02 0.00 - 0.04 K/uL    Lymph # 1.4 1.0 - 4.8 K/uL    Mono # 0.5 0.3 - 1.0 K/uL    Eos # 0.1 0.0 - 0.5 K/uL    Baso # 0.02 0.00 - 0.20 K/uL    nRBC 0 0 /100 WBC    Gran% 64.6 38.0 - 73.0 %    Lymph% 24.4 18.0 - 48.0 %    Mono% 8.8 4.0 - 15.0 %    Eosinophil% 1.4 0.0 - 8.0 %    Basophil% 0.4 0.0 - 1.9 %    Differential Method Automated    Comprehensive metabolic panel   Result Value Ref Range    Sodium  141 136 - 145 mmol/L    Potassium 5.0 3.5 - 5.1 mmol/L    Chloride 103 95 - 110 mmol/L    CO2 29 23 - 29 mmol/L    Glucose 87 70 - 110 mg/dL    BUN, Bld 20 6 - 20 mg/dL    Creatinine 1.1 0.5 - 1.4 mg/dL    Calcium 9.9 8.7 - 10.5 mg/dL    Total Protein 7.1 6.0 - 8.4 g/dL    Albumin 3.5 3.5 - 5.2 g/dL    Total Bilirubin 0.5 0.1 - 1.0 mg/dL    Alkaline Phosphatase 111 55 - 135 U/L    AST 18 10 - 40 U/L    ALT 19 10 - 44 U/L    Anion Gap 9 8 - 16 mmol/L    eGFR if African American >60.0 >60 mL/min/1.73 m^2    eGFR if non  56.7 (A) >60 mL/min/1.73 m^2   Lipid panel   Result Value Ref Range    Cholesterol 240 (H) 120 - 199 mg/dL    Triglycerides 162 (H) 30 - 150 mg/dL    HDL 67 40 - 75 mg/dL    LDL Cholesterol 140.6 63.0 - 159.0 mg/dL    HDL/Chol Ratio 27.9 20.0 - 50.0 %    Total Cholesterol/HDL Ratio 3.6 2.0 - 5.0    Non-HDL Cholesterol 173 mg/dL   TSH   Result Value Ref Range    TSH 2.529 0.400 - 4.000 uIU/mL   Vitamin D   Result Value Ref Range    Vit D, 25-Hydroxy 78 30 - 96 ng/mL         Assessment:       1. Encounter for preventive health examination    2. Vitamin D deficiency    3. Pure hypercholesterolemia    4. URBAN (obstructive sleep apnea)    5. Asthma, exercise induced    6. Anxiety and depression    7. Acquired hypothyroidism    8. Skin lesion    9. Noise-induced hearing loss of both ears          Plan:     Encounter for preventive health examination- utd.  -     Tdap Vaccine  -     Hepatitis A Vaccine (Adult) (IM)    Vitamin D deficiency- decrease to 1 qd.    Pure hypercholesterolemia- 10yr risk 1.3%, start regular exercise.    URBAN (obstructive sleep apnea) doing well on CPAP.    Asthma, exercise induced- albuterol prn exertion.    Anxiety and depression- cotn wellbutrin/prozac, add xanax for prn.  RTC 1mo.  -     ALPRAZolam (XANAX) 0.25 MG tablet; Take 1 tablet (0.25 mg total) by mouth 2 (two) times daily as needed for Anxiety.  Dispense: 30 tablet; Refill: 0    Acquired hypothyroidism-  Clinically stable, continue present treatment.    Skin lesion  -     Ambulatory consult to Dermatology    Noise-induced hearing loss of both ears  -     Ambulatory consult to Audiology

## 2019-02-22 ENCOUNTER — LAB VISIT (OUTPATIENT)
Dept: LAB | Facility: HOSPITAL | Age: 54
End: 2019-02-22
Attending: INTERNAL MEDICINE
Payer: COMMERCIAL

## 2019-02-22 DIAGNOSIS — Z29.9 PREVENTIVE MEASURE: ICD-10-CM

## 2019-02-22 DIAGNOSIS — E78.00 PURE HYPERCHOLESTEROLEMIA: ICD-10-CM

## 2019-02-22 DIAGNOSIS — E55.9 VITAMIN D DEFICIENCY: ICD-10-CM

## 2019-02-22 DIAGNOSIS — E03.9 ACQUIRED HYPOTHYROIDISM: ICD-10-CM

## 2019-02-22 LAB
25(OH)D3+25(OH)D2 SERPL-MCNC: 78 NG/ML
ALBUMIN SERPL BCP-MCNC: 3.5 G/DL
ALP SERPL-CCNC: 111 U/L
ALT SERPL W/O P-5'-P-CCNC: 19 U/L
ANION GAP SERPL CALC-SCNC: 9 MMOL/L
AST SERPL-CCNC: 18 U/L
BASOPHILS # BLD AUTO: 0.02 K/UL
BASOPHILS NFR BLD: 0.4 %
BILIRUB SERPL-MCNC: 0.5 MG/DL
BUN SERPL-MCNC: 20 MG/DL
CALCIUM SERPL-MCNC: 9.9 MG/DL
CHLORIDE SERPL-SCNC: 103 MMOL/L
CHOLEST SERPL-MCNC: 240 MG/DL
CHOLEST/HDLC SERPL: 3.6 {RATIO}
CO2 SERPL-SCNC: 29 MMOL/L
CREAT SERPL-MCNC: 1.1 MG/DL
DIFFERENTIAL METHOD: NORMAL
EOSINOPHIL # BLD AUTO: 0.1 K/UL
EOSINOPHIL NFR BLD: 1.4 %
ERYTHROCYTE [DISTWIDTH] IN BLOOD BY AUTOMATED COUNT: 13.4 %
EST. GFR  (AFRICAN AMERICAN): >60 ML/MIN/1.73 M^2
EST. GFR  (NON AFRICAN AMERICAN): 56.7 ML/MIN/1.73 M^2
GLUCOSE SERPL-MCNC: 87 MG/DL
HCT VFR BLD AUTO: 43.4 %
HDLC SERPL-MCNC: 67 MG/DL
HDLC SERPL: 27.9 %
HGB BLD-MCNC: 14.3 G/DL
IMM GRANULOCYTES # BLD AUTO: 0.02 K/UL
IMM GRANULOCYTES NFR BLD AUTO: 0.4 %
LDLC SERPL CALC-MCNC: 140.6 MG/DL
LYMPHOCYTES # BLD AUTO: 1.4 K/UL
LYMPHOCYTES NFR BLD: 24.4 %
MCH RBC QN AUTO: 28.6 PG
MCHC RBC AUTO-ENTMCNC: 32.9 G/DL
MCV RBC AUTO: 87 FL
MONOCYTES # BLD AUTO: 0.5 K/UL
MONOCYTES NFR BLD: 8.8 %
NEUTROPHILS # BLD AUTO: 3.7 K/UL
NEUTROPHILS NFR BLD: 64.6 %
NONHDLC SERPL-MCNC: 173 MG/DL
NRBC BLD-RTO: 0 /100 WBC
PLATELET # BLD AUTO: 207 K/UL
PMV BLD AUTO: 9.7 FL
POTASSIUM SERPL-SCNC: 5 MMOL/L
PROT SERPL-MCNC: 7.1 G/DL
RBC # BLD AUTO: 5 M/UL
SODIUM SERPL-SCNC: 141 MMOL/L
TRIGL SERPL-MCNC: 162 MG/DL
TSH SERPL DL<=0.005 MIU/L-ACNC: 2.53 UIU/ML
WBC # BLD AUTO: 5.66 K/UL

## 2019-02-22 PROCEDURE — 80061 LIPID PANEL: CPT

## 2019-02-22 PROCEDURE — 80053 COMPREHEN METABOLIC PANEL: CPT

## 2019-02-22 PROCEDURE — 82306 VITAMIN D 25 HYDROXY: CPT

## 2019-02-22 PROCEDURE — 85025 COMPLETE CBC W/AUTO DIFF WBC: CPT

## 2019-02-22 PROCEDURE — 36415 COLL VENOUS BLD VENIPUNCTURE: CPT

## 2019-02-22 PROCEDURE — 84443 ASSAY THYROID STIM HORMONE: CPT

## 2019-03-01 ENCOUNTER — OFFICE VISIT (OUTPATIENT)
Dept: INTERNAL MEDICINE | Facility: CLINIC | Age: 54
End: 2019-03-01
Payer: COMMERCIAL

## 2019-03-01 VITALS
HEIGHT: 64 IN | TEMPERATURE: 98 F | DIASTOLIC BLOOD PRESSURE: 68 MMHG | HEART RATE: 79 BPM | BODY MASS INDEX: 38.69 KG/M2 | SYSTOLIC BLOOD PRESSURE: 102 MMHG | OXYGEN SATURATION: 99 % | WEIGHT: 226.63 LBS

## 2019-03-01 DIAGNOSIS — E03.9 ACQUIRED HYPOTHYROIDISM: ICD-10-CM

## 2019-03-01 DIAGNOSIS — F41.9 ANXIETY AND DEPRESSION: ICD-10-CM

## 2019-03-01 DIAGNOSIS — F32.A ANXIETY AND DEPRESSION: ICD-10-CM

## 2019-03-01 DIAGNOSIS — E78.00 PURE HYPERCHOLESTEROLEMIA: ICD-10-CM

## 2019-03-01 DIAGNOSIS — L98.9 SKIN LESION: ICD-10-CM

## 2019-03-01 DIAGNOSIS — J45.990 ASTHMA, EXERCISE INDUCED: ICD-10-CM

## 2019-03-01 DIAGNOSIS — Z00.00 ENCOUNTER FOR PREVENTIVE HEALTH EXAMINATION: Primary | ICD-10-CM

## 2019-03-01 DIAGNOSIS — G47.33 OSA (OBSTRUCTIVE SLEEP APNEA): ICD-10-CM

## 2019-03-01 DIAGNOSIS — H83.3X3 NOISE-INDUCED HEARING LOSS OF BOTH EARS: ICD-10-CM

## 2019-03-01 DIAGNOSIS — E55.9 VITAMIN D DEFICIENCY: ICD-10-CM

## 2019-03-01 PROCEDURE — 99999 PR PBB SHADOW E&M-EST. PATIENT-LVL V: ICD-10-PCS | Mod: PBBFAC,,, | Performed by: INTERNAL MEDICINE

## 2019-03-01 PROCEDURE — 90715 TDAP VACCINE 7 YRS/> IM: CPT | Mod: S$GLB,,, | Performed by: INTERNAL MEDICINE

## 2019-03-01 PROCEDURE — 90715 TDAP VACCINE GREATER THAN OR EQUAL TO 7YO IM: ICD-10-PCS | Mod: S$GLB,,, | Performed by: INTERNAL MEDICINE

## 2019-03-01 PROCEDURE — 90471 TDAP VACCINE GREATER THAN OR EQUAL TO 7YO IM: ICD-10-PCS | Mod: S$GLB,,, | Performed by: INTERNAL MEDICINE

## 2019-03-01 PROCEDURE — 99396 PREV VISIT EST AGE 40-64: CPT | Mod: 25,S$GLB,, | Performed by: INTERNAL MEDICINE

## 2019-03-01 PROCEDURE — 99396 PR PREVENTIVE VISIT,EST,40-64: ICD-10-PCS | Mod: 25,S$GLB,, | Performed by: INTERNAL MEDICINE

## 2019-03-01 PROCEDURE — 90471 IMMUNIZATION ADMIN: CPT | Mod: S$GLB,,, | Performed by: INTERNAL MEDICINE

## 2019-03-01 PROCEDURE — 99999 PR PBB SHADOW E&M-EST. PATIENT-LVL V: CPT | Mod: PBBFAC,,, | Performed by: INTERNAL MEDICINE

## 2019-03-01 RX ORDER — ALPRAZOLAM 0.25 MG/1
0.25 TABLET ORAL 2 TIMES DAILY PRN
Qty: 30 TABLET | Refills: 0 | Status: SHIPPED | OUTPATIENT
Start: 2019-03-01 | End: 2020-01-03

## 2019-03-04 ENCOUNTER — PATIENT MESSAGE (OUTPATIENT)
Dept: INTERNAL MEDICINE | Facility: CLINIC | Age: 54
End: 2019-03-04

## 2019-03-06 ENCOUNTER — INITIAL CONSULT (OUTPATIENT)
Dept: DERMATOLOGY | Facility: CLINIC | Age: 54
End: 2019-03-06
Payer: COMMERCIAL

## 2019-03-06 ENCOUNTER — CLINICAL SUPPORT (OUTPATIENT)
Dept: AUDIOLOGY | Facility: CLINIC | Age: 54
End: 2019-03-06
Payer: COMMERCIAL

## 2019-03-06 DIAGNOSIS — L98.9 SKIN EROSION: ICD-10-CM

## 2019-03-06 DIAGNOSIS — H90.5 HEARING LOSS, SENSORINEURAL, COMBINED TYPES: Primary | ICD-10-CM

## 2019-03-06 DIAGNOSIS — L73.9 FOLLICULITIS: ICD-10-CM

## 2019-03-06 DIAGNOSIS — T38.0X5A PERIORAL DERMATITIS DUE TO CORTICOSTEROID: Primary | ICD-10-CM

## 2019-03-06 DIAGNOSIS — L71.0 PERIORAL DERMATITIS DUE TO CORTICOSTEROID: Primary | ICD-10-CM

## 2019-03-06 PROCEDURE — 92557 PR COMPREHENSIVE HEARING TEST: ICD-10-PCS | Mod: S$GLB,,, | Performed by: AUDIOLOGIST

## 2019-03-06 PROCEDURE — 99999 PR PBB SHADOW E&M-EST. PATIENT-LVL II: ICD-10-PCS | Mod: PBBFAC,,, | Performed by: PHYSICIAN ASSISTANT

## 2019-03-06 PROCEDURE — 92567 TYMPANOMETRY: CPT | Mod: S$GLB,,, | Performed by: AUDIOLOGIST

## 2019-03-06 PROCEDURE — 92567 PR TYMPA2METRY: ICD-10-PCS | Mod: S$GLB,,, | Performed by: AUDIOLOGIST

## 2019-03-06 PROCEDURE — 99202 PR OFFICE/OUTPT VISIT, NEW, LEVL II, 15-29 MIN: ICD-10-PCS | Mod: S$GLB,,, | Performed by: PHYSICIAN ASSISTANT

## 2019-03-06 PROCEDURE — 99202 OFFICE O/P NEW SF 15 MIN: CPT | Mod: S$GLB,,, | Performed by: PHYSICIAN ASSISTANT

## 2019-03-06 PROCEDURE — 92557 COMPREHENSIVE HEARING TEST: CPT | Mod: S$GLB,,, | Performed by: AUDIOLOGIST

## 2019-03-06 PROCEDURE — 99999 PR PBB SHADOW E&M-EST. PATIENT-LVL II: CPT | Mod: PBBFAC,,, | Performed by: PHYSICIAN ASSISTANT

## 2019-03-06 RX ORDER — METRONIDAZOLE 7.5 MG/G
CREAM TOPICAL 2 TIMES DAILY
Qty: 45 G | Refills: 2 | Status: SHIPPED | OUTPATIENT
Start: 2019-03-06 | End: 2021-06-10

## 2019-03-06 RX ORDER — CLINDAMYCIN PHOSPHATE 11.9 MG/ML
SOLUTION TOPICAL
Qty: 30 ML | Refills: 2 | Status: SHIPPED | OUTPATIENT
Start: 2019-03-06 | End: 2020-01-03

## 2019-03-06 NOTE — LETTER
March 6, 2019      Alyce Schroeedr MD  [address] [place] [address]           [facility]  [address] [facility] [facility] [address]  Phone: [phone]  Fax: [phone]          Patient: Taryn Serrano   MR Number: [id number]   YOB: 1965   Date of Visit: 3/6/2019       Dear Dr. Alyce Schroeder:    Thank you for referring Taryn Serrano to me for evaluation. Attached you will find relevant portions of my assessment and plan of care.    If you have questions, please do not hesitate to call me. I look forward to following Taryn Serrano along with you.    Sincerely,    Georgina Quach PA-C    Enclosure  CC:  No Recipients    If you would like to receive this communication electronically, please contact [email or web address] or [phone] to request more information on [employer] Link access.    For providers and/or their staff who would like to refer a patient to [facility], please contact us through our one-stop-shop provider referral line, [facility], at [phone].    If you feel you have received this communication in error or would no longer like to receive these types of communications, please e-mail [email or web address]

## 2019-03-06 NOTE — PROGRESS NOTES
Taryn Serrano was seen 03/06/2019 for an audiological evaluation.  Patient complains of a possible gradual decrease in hearing.  She denies any tinnitus or dizziness.    Results reveal normal hearing sensitivity 250-6000 Hz bilaterally and a mild hearing loss at 8K Hz, bilaterally.  Speech Reception Thresholds were  10 dBHL for the right ear and 10 dBHL for the left ear.   Word recognition scores were excellent bilaterally.  Tympanograms were Type A, normal for the right ear and Type A, normal for the left ear.    Patient was counseled on the above findings.    Recommendations include:    1.  ENT followup as needed  2.  Wear hearing protective devices around loud noise  3.  Annual audiograms

## 2019-03-06 NOTE — PROGRESS NOTES
"  Subjective:       Patient ID:  Taryn Serrano is a 53 y.o. female who presents for   Chief Complaint   Patient presents with    Sores     in head    Rash     on face     Hx of foliculitis of scalp, last seen by Dr. Crisostomo 2/4/15. Here today for new c/o.  History of Present Illness: The patient presents with chief complaint of rash.  Location: face (right perinasal skin)  Duration: 1 month  Signs/Symptoms: redness, irritation, bumps, dryness    Prior treatments: lotrimin, belén's salve, OTC HC, Neopsporin  PMHX: sleep apnea - wears CPAP w/nasal clips    For scalp foliculitis, previous tx: clinda solution qd, 14 day course of doryx and mupirocin ointment, c/ improvement at last visit (2/4/15).  She notes today is "a good day," but notes frequent flares. Current tx: Dove Shampoo and Neopsorin prn flares.        Review of Systems   Constitutional: Negative for fever and chills.   Gastrointestinal: Negative for nausea and vomiting.   Skin: Positive for itching, rash and activity-related sunscreen use. Negative for dry skin, daily sunscreen use and recent sunburn.   Hematologic/Lymphatic: Does not bruise/bleed easily.        Objective:    Physical Exam   Constitutional: She appears well-developed and well-nourished. No distress.   Neurological: She is alert and oriented to person, place, and time. She is not disoriented.   Psychiatric: She has a normal mood and affect.   Skin:   Areas Examined (abnormalities noted in diagram):   Scalp / Hair Palpated and Inspected  Head / Face Inspection Performed  Neck Inspection Performed  Chest / Axilla Inspection Performed  Back Inspection Performed  RUE Inspected  LUE Inspection Performed              Diagram Legend     Erythematous scaling macule/papule c/w actinic keratosis       Vascular papule c/w angioma      Pigmented verrucoid papule/plaque c/w seborrheic keratosis      Yellow umbilicated papule c/w sebaceous hyperplasia      Irregularly shaped tan macule c/w lentigo     " 1-2 mm smooth white papules consistent with Milia      Movable subcutaneous cyst with punctum c/w epidermal inclusion cyst      Subcutaneous movable cyst c/w pilar cyst      Firm pink to brown papule c/w dermatofibroma      Pedunculated fleshy papule(s) c/w skin tag(s)      Evenly pigmented macule c/w junctional nevus     Mildly variegated pigmented, slightly irregular-bordered macule c/w mildly atypical nevus      Flesh colored to evenly pigmented papule c/w intradermal nevus       Pink pearly papule/plaque c/w basal cell carcinoma      Erythematous hyperkeratotic cursted plaque c/w SCC      Surgical scar with no sign of skin cancer recurrence      Open and closed comedones      Inflammatory papules and pustules      Verrucoid papule consistent consistent with wart     Erythematous eczematous patches and plaques     Dystrophic onycholytic nail with subungual debris c/w onychomycosis     Umbilicated papule    Erythematous-base heme-crusted tan verrucoid plaque consistent with inflamed seborrheic keratosis     Erythematous Silvery Scaling Plaque c/w Psoriasis     See annotation      Assessment / Plan:        Perioral dermatitis due to corticosteroid  Ddx: other dermatitis  -     metronidazole 0.75% (METROCREAM) 0.75 % Crea; Apply topically 2 (two) times daily.  Dispense: 45 g; Refill: 2  Discussed dx and tx options. Suspect perioral dermatitis secondary to inhaled corticosteroid. Discussed weaning flonase and using a saline nose spray qd prn. Will start above med + gentle skin care. Recommend Dove Sensitive skin. Discussed association w/rosacea and encouraged gentle skin care and daily sunscreen w/spf 30.  AAD brochure provided.    Skin erosion  Folliculitis  -     clindamycin (CLEOCIN T) 1 % external solution; AAA of scalp bid  Dispense: 30 mL; Refill: 2  Discussed dx. Will refill above med.  Recommend trial of CLN Shampoo q weekly for maintenance. Advised against picking.  She understands to notify for any  worsening.         Follow-up in about 2 months (around 5/6/2019) for rosacea.

## 2019-03-19 NOTE — PROGRESS NOTES
"Subjective:      Patient ID: Taryn Serrano is a 53 y.o. female.    Chief Complaint: Fatigue; Depression; Follow-up; and Anxiety      HPI  Here for follow up of medical problems.  Not dramatically relieved with xanax.  Feels low and daytime fatigue, misses sister.  Had been better in past on higher dose prozac.  No exercise.    Updated/ annual due 3/20:  HM: 10/18 fluvax, 3/19 HAV, 1/15 [id number], 10/13 [id number] booster, 3/19 today TDaP, 3/18 MMG/ Gyn Dr. Alfonso, 1/16 BMD low fx risk rep 5y, 7/12 Cscope rep 10y, 2/17 HCV neg.     Review of Systems   Constitutional: Negative for chills, diaphoresis and fever.   Respiratory: Negative for cough and shortness of breath.    Cardiovascular: Negative for chest pain, palpitations and leg swelling.   Gastrointestinal: Negative for blood in stool, constipation, diarrhea, nausea and vomiting.   Genitourinary: Negative for dysuria, frequency and hematuria.   Psychiatric/Behavioral: The patient is not nervous/anxious.          Objective:   /82 (BP Location: Left arm, Patient Position: Sitting, BP Method: Medium (Manual))   Pulse 72   Temp 98.3 °F (36.8 °C) (Tympanic)   Ht 5' 4" (1.626 m)   Wt 104.9 kg (231 lb 4.2 oz)   LMP 01/07/2015   SpO2 99%   BMI 39.70 kg/m²     Physical Exam   Constitutional: She is oriented to person, place, and time. She appears well-developed.   HENT:   Mouth/Throat: Oropharynx is clear and moist.   Neck: Neck supple. Carotid bruit is not present. No thyroid mass present.   Cardiovascular: Normal rate, regular rhythm and intact distal pulses. Exam reveals no gallop and no friction rub.   No murmur heard.  Pulmonary/Chest: Effort normal and breath sounds normal. She has no wheezes. She has no rales.   Abdominal: Soft. Bowel sounds are normal. She exhibits no mass. There is no hepatosplenomegaly. There is no tenderness.   Musculoskeletal: She exhibits no edema.   Lymphadenopathy:     She has no cervical adenopathy.   Neurological: She is " alert and oriented to person, place, and time.   Psychiatric: She exhibits a depressed mood.           Assessment:       1. Anxiety and depression    2. Asthma, exercise induced    3. Acquired hypothyroidism          Plan:     Anxiety and depression- cont prozac, increase wellbutrin.  RTC 2mo.  Increase exercise as tolerated.  -     buPROPion (WELLBUTRIN XL) 300 MG 24 hr tablet; Take 1 tablet (300 mg total) by mouth once daily.  Dispense: 30 tablet; Refill: 11    Asthma, exercise induced- doing well, prn alb.    Acquired hypothyroidism- TSH normal 4wk ago.

## 2019-03-27 DIAGNOSIS — F41.9 ANXIETY AND DEPRESSION: ICD-10-CM

## 2019-03-27 DIAGNOSIS — F32.A ANXIETY AND DEPRESSION: ICD-10-CM

## 2019-03-27 RX ORDER — FLUOXETINE HYDROCHLORIDE 40 MG/1
CAPSULE ORAL
Qty: 60 CAPSULE | Refills: 6 | Status: SHIPPED | OUTPATIENT
Start: 2019-03-27 | End: 2019-09-23 | Stop reason: SDUPTHER

## 2019-04-02 ENCOUNTER — OFFICE VISIT (OUTPATIENT)
Dept: INTERNAL MEDICINE | Facility: CLINIC | Age: 54
End: 2019-04-02
Payer: COMMERCIAL

## 2019-04-02 VITALS
TEMPERATURE: 98 F | DIASTOLIC BLOOD PRESSURE: 82 MMHG | HEIGHT: 64 IN | SYSTOLIC BLOOD PRESSURE: 110 MMHG | WEIGHT: 231.25 LBS | BODY MASS INDEX: 39.48 KG/M2 | HEART RATE: 72 BPM | OXYGEN SATURATION: 99 %

## 2019-04-02 DIAGNOSIS — E03.9 ACQUIRED HYPOTHYROIDISM: ICD-10-CM

## 2019-04-02 DIAGNOSIS — F32.A ANXIETY AND DEPRESSION: Primary | ICD-10-CM

## 2019-04-02 DIAGNOSIS — F41.9 ANXIETY AND DEPRESSION: Primary | ICD-10-CM

## 2019-04-02 DIAGNOSIS — J45.990 ASTHMA, EXERCISE INDUCED: ICD-10-CM

## 2019-04-02 PROCEDURE — 3008F BODY MASS INDEX DOCD: CPT | Mod: CPTII,S$GLB,, | Performed by: INTERNAL MEDICINE

## 2019-04-02 PROCEDURE — 99999 PR PBB SHADOW E&M-EST. PATIENT-LVL IV: ICD-10-PCS | Mod: PBBFAC,,, | Performed by: INTERNAL MEDICINE

## 2019-04-02 PROCEDURE — 99999 PR PBB SHADOW E&M-EST. PATIENT-LVL IV: CPT | Mod: PBBFAC,,, | Performed by: INTERNAL MEDICINE

## 2019-04-02 PROCEDURE — 99213 PR OFFICE/OUTPT VISIT, EST, LEVL III, 20-29 MIN: ICD-10-PCS | Mod: S$GLB,,, | Performed by: INTERNAL MEDICINE

## 2019-04-02 PROCEDURE — 3008F PR BODY MASS INDEX (BMI) DOCUMENTED: ICD-10-PCS | Mod: CPTII,S$GLB,, | Performed by: INTERNAL MEDICINE

## 2019-04-02 PROCEDURE — 99213 OFFICE O/P EST LOW 20 MIN: CPT | Mod: S$GLB,,, | Performed by: INTERNAL MEDICINE

## 2019-04-02 RX ORDER — BUPROPION HYDROCHLORIDE 300 MG/1
300 TABLET ORAL DAILY
Qty: 30 TABLET | Refills: 11 | Status: SHIPPED | OUTPATIENT
Start: 2019-04-02 | End: 2019-04-26 | Stop reason: SDUPTHER

## 2019-04-02 RX ORDER — BUPROPION HYDROCHLORIDE 300 MG/1
300 TABLET ORAL DAILY
Qty: 30 TABLET | Refills: 11 | Status: SHIPPED | OUTPATIENT
Start: 2019-04-02 | End: 2019-04-02 | Stop reason: SDUPTHER

## 2019-04-15 ENCOUNTER — PATIENT MESSAGE (OUTPATIENT)
Dept: FAMILY MEDICINE | Facility: CLINIC | Age: 54
End: 2019-04-15

## 2019-04-15 ENCOUNTER — OFFICE VISIT (OUTPATIENT)
Dept: INTERNAL MEDICINE | Facility: CLINIC | Age: 54
End: 2019-04-15
Payer: COMMERCIAL

## 2019-04-15 VITALS
SYSTOLIC BLOOD PRESSURE: 112 MMHG | HEIGHT: 64 IN | OXYGEN SATURATION: 99 % | TEMPERATURE: 99 F | DIASTOLIC BLOOD PRESSURE: 76 MMHG | BODY MASS INDEX: 39.4 KG/M2 | WEIGHT: 230.81 LBS | HEART RATE: 86 BPM

## 2019-04-15 DIAGNOSIS — K21.9 GASTROESOPHAGEAL REFLUX DISEASE, ESOPHAGITIS PRESENCE NOT SPECIFIED: Primary | ICD-10-CM

## 2019-04-15 DIAGNOSIS — R10.13 EPIGASTRIC PAIN: ICD-10-CM

## 2019-04-15 PROCEDURE — 3008F BODY MASS INDEX DOCD: CPT | Mod: CPTII,S$GLB,, | Performed by: PHYSICIAN ASSISTANT

## 2019-04-15 PROCEDURE — 99213 PR OFFICE/OUTPT VISIT, EST, LEVL III, 20-29 MIN: ICD-10-PCS | Mod: S$GLB,,, | Performed by: PHYSICIAN ASSISTANT

## 2019-04-15 PROCEDURE — 99999 PR PBB SHADOW E&M-EST. PATIENT-LVL IV: ICD-10-PCS | Mod: PBBFAC,,, | Performed by: PHYSICIAN ASSISTANT

## 2019-04-15 PROCEDURE — 3008F PR BODY MASS INDEX (BMI) DOCUMENTED: ICD-10-PCS | Mod: CPTII,S$GLB,, | Performed by: PHYSICIAN ASSISTANT

## 2019-04-15 PROCEDURE — 99213 OFFICE O/P EST LOW 20 MIN: CPT | Mod: S$GLB,,, | Performed by: PHYSICIAN ASSISTANT

## 2019-04-15 PROCEDURE — 99999 PR PBB SHADOW E&M-EST. PATIENT-LVL IV: CPT | Mod: PBBFAC,,, | Performed by: PHYSICIAN ASSISTANT

## 2019-04-15 NOTE — PATIENT INSTRUCTIONS
Constipation (Adult)  Constipation means that you have bowel movements that are less frequent than usual. Stools often become very hard and difficult to pass.  Constipation is very common. At some point in life it affects almost everyone. Since everyone's bowel habits are different, what is constipation to one person may not be to another. Your healthcare provider may do tests to diagnose constipation. It depends on what he or she finds when evaluating you.    Symptoms of constipation include:  · Abdominal pain  · Bloating  · Vomiting  · Painful bowel movements  · Itching, swelling, bleeding, or pain around the anus  Causes  Constipation can have many causes. These include:  · Diet low in fiber  · Too much dairy  · Not drinking enough liquids  · Lack of exercise or physical activity. This is especially true for older adults.  · Changes in lifestyle or daily routine, including pregnancy, aging, work, and travel  · Frequent use or misuse of laxatives  · Ignoring the urge to have a bowel movement or delaying it until later  · Medicines, such as certain prescription pain medicines, iron supplements, antacids, certain antidepressants, and calcium supplements  · Diseases like irritable bowel syndrome, bowel obstructions, stroke, diabetes, thyroid disease, Parkinson disease, hemorrhoids, and colon cancer  Complications  Potential complications of constipation can include:  · Hemorrhoids  · Rectal bleeding from hemorrhoids or anal fissures (skin tears)  · Hernias  · Dependency on laxatives  · Chronic constipation  · Fecal impaction  · Bowel obstruction or perforation  Home care  All treatment should be done after talking with your healthcare provider. This is especially true if you have another medical problems, are taking prescription medicines, or are an older adult. Treatment most often involves lifestyle changes. You may also need medicines. Your healthcare provider will tell you which will work best for you. Follow  the advice below to help avoid this problem in the future.  Lifestyle changes  These lifestyle changes can help prevent constipation:  · Diet. Eat a high-fiber diet, with fresh fruit and vegetables, and reduce dairy intake, meats, and processed foods  · Fluids. It's important to get enough fluids each day. Drink plenty of water when you eat more fiber. If you are on diet that limits the amount of fluid you can have, talk about this with your healthcare provider.  · Regular exercise. Check with your healthcare provider first.  Medications  Take any medicines as directed. Some laxatives are safe to use only every now and then. Others can be taken on a regular basis. Talk with your doctor or pharmacist if you have questions.  Prescription pain medicines can cause constipation. If you are taking this kind of medicine, ask your healthcare provider if you should also take a stool softener.  Medicines you may take to treat constipation include:  · Fiber supplements  · Stool softeners  · Laxatives  · Enemas  · Rectal suppositories  Follow-up care  Follow up with your healthcare provider if symptoms don't get better in the next few days. You may need to have more tests or see a specialist.  Call 911  Call 911 if any of these occur:  · Trouble breathing  · Stiff, rigid abdomen that is severely painful to touch  · Confusion  · Fainting or loss of consciousness  · Rapid heart rate  · Chest pain  When to seek medical advice  Call your healthcare provider right away if any of these occur:  · Fever over 100.4°F (38°C)  · Failure to resume normal bowel movements  · Pain in your abdomen or back gets worse  · Nausea or vomiting  · Swelling in your abdomen  · Blood in the stool  · Black, tarry stool  · Involuntary weight loss  · Weakness  Date Last Reviewed: 12/30/2015  © 4952-3736 [employer]. [address] [place] [address], [place] [address]. All rights reserved. This information is not intended as a substitute for  professional medical care. Always follow your healthcare professional's instructions.        Eating a High-Fiber Diet  Fiber is what gives strength and structure to plants. Most grains, beans, vegetables, and fruits contain fiber. Foods rich in fiber are often low in calories and fat, and they fill you up more. They may also reduce your risks for certain health problems. To find out the amount of fiber in canned, packaged, or frozen foods, read the Nutrition Facts label. It tells you how much fiber is in a serving.    Types of fiber and their benefits  There are two types of fiber: insoluble and soluble. They both aid digestion and help you maintain a healthy weight.  · Insoluble fiber. This is found in whole grains, cereals, certain fruits and vegetables such as apple skin, corn, and carrots. Insoluble fiber may prevent constipation and reduce the risk for certain types of cancer.  · Soluble fiber. This type of fiber is in oats, beans, and certain fruits and vegetables such as strawberries and peas. Soluble fiber can reduce cholesterol, which may help lower the risk for heart disease. It also helps control blood sugar levels.  Look for high-fiber foods  Try these foods to add fiber to your diet:  · Whole-grain breads and cereals. Try to eat 6 to 8 ounces a day. Include wheat and oat bran cereals, whole-wheat muffins or toast, and corn tortillas in your meals.  · Fruits. Try to eat 2 cups a day. Apples, oranges, strawberries, pears, and bananas are good sources. (Note: Fruit juice is low in fiber.)  · Vegetables. Try to eat at least 2.5 cups a day. Add asparagus, carrots, broccoli, peas, and corn to your meals.  · Beans. One cup of cooked lentils gives you over 15 grams of fiber. Try navy beans, lentils, and chickpeas.  · Seeds. A small handful of seeds gives you about 3 grams of fiber. Try sunflower seeds.  Keep track of your fiber  Keep track of how much fiber you eat. Start by reading food labels. Then eat a  variety of foods high in fiber. As you begin to eat more fiber, ask your healthcare provider how much water you should be drinking to keep your digestive system working smoothly.  You should aim for a certain amount of fiber in your diet each day. If you are a woman, that amount is between 25 and 28 grams per day. Men should aim for 30 to 33 grams per day. After age 50, your daily fiber needs drop to 22 grams for women and 28 grams for men.  Before you reach for the fiber supplements, think about this. Fiber is found naturally in healthy whole foods. It gives you that feeling of fullness after you eat. Taking fiber supplements or eating fiber-enriched foods will not give you this full feeling.  Your fiber intake is a good measure for the quality of your overall diet. If you are missing out on your daily amount of fiber, you may be lacking other important nutrients as well.  Date Last Reviewed: 5/11/2015  © 4840-9038 [employer]. [address] [place] [address]. All rights reserved. This information is not intended as a substitute for professional medical care. Always follow your healthcare professional's instructions.        Tips to Control Acid Reflux    To control acid reflux, youll need to make some basic diet and lifestyle changes. The simple steps outlined below may be all youll need to ease discomfort.  Watch what you eat  · Avoid fatty foods and spicy foods.  · Eat fewer acidic foods, such as citrus and tomato-based foods. These can increase symptoms.  · Limit drinking alcohol, caffeine, and fizzy beverages. All increase acid reflux.  · Try limiting chocolate, peppermint, and spearmint. These can worsen acid reflux in some people.  Watch when you eat  · Avoid lying down for 3 hours after eating.  · Do not snack before going to bed.  Raise your head  Raising your head and upper body by 4 to 6 inches helps limit reflux when youre lying down. Put blocks under the head of your bed frame  to raise it.  Other changes  · Lose weight, if you need to  · Dont exercise near bedtime  · Avoid tight-fitting clothes  · Limit aspirin and ibuprofen  · Stop smoking   Date Last Reviewed: 7/1/2016  © 8558-2903 [employer]. [address] [place] [address], [place] [address]. All rights reserved. This information is not intended as a substitute for professional medical care. Always follow your healthcare professional's instructions.        Discharge Instructions for Gastroesophageal Reflux Disease (GERD)  Gastroesophageal reflux disease (GERD) is a backflow of acid from the stomach into the swallowing tube (esophagus).  Home care  These home care steps can help you manage GERD:  · Maintain a healthy weight. Get help to lose any extra pounds.  · Avoid lying down after meals.  · Avoid eating late at night.  · Elevate the head of your bed by 6 inches. You can do this by placing wooden blocks or bed risers under the head of your bed.  · Avoid wearing tight-fitting clothes.  · Avoid foods that might irritate your stomach, such as the following:  ¨ Alcohol  ¨ Fat  ¨ Chocolate  ¨ Caffeine  ¨ Spearmint or peppermint  · Talk to your healthcare provider if you are taking any of the following medicines. These medicines can make GERD symptoms worse:  ¨ Calcium channel blockers  ¨ Theophylline  ¨ Anticholinergic medicines, such as oxybutynin and benzatropine  · Begin an exercise program. Ask your healthcare provider how to get started. You can benefit from simple activities, such as walking or gardening.  · Break the smoking habit. Enroll in a stop-smoking program to improve your chances of success.  · Limit alcohol intake to no more than 2 drinks a day.  · Take your medicines exactly as directed. Dont skip doses.  · Avoid over-the-counter nonsteroidal anti-inflammatory medicines, such as aspirin and ibuprofen, unless recommended by your healthcare provider for certain conditions.   · If possible, avoid nitrates (heart  medicines, such as nitroglycerin and isosorbide dinitrate ).  Follow-up care  Make a follow-up appointment as directed by our staff.     When to call the healthcare provider  Call your healthcare provider immediately if you have any of the following:  · Trouble swallowing  · Pain when swallowing  · Feeling of food caught in your chest or throat  · Pain in the neck, chest, or back  · Heartburn that causes you to vomit  · Vomiting blood  · Black or tarry stools (from digested blood)  · More saliva (watering of the mouth) than usual  · Weight loss of more than 3% to 5% of your total body weight in a month  · Hoarseness or sore throat that wont go away  · Choking, coughing, or wheezing   Date Last Reviewed: 7/1/2016  © 1235-4349 [employer]. [address] [place] [address], [place] [address]. All rights reserved. This information is not intended as a substitute for professional medical care. Always follow your healthcare professional's instructions.

## 2019-04-15 NOTE — PROGRESS NOTES
Subjective:      Patient ID: Taryn Serrano is a 53 y.o. female.    Chief Complaint: lower left side abd pain and Abdominal Pain    Patient states that she had a large BM on Sunday with solid mixed with diarrhea. Since then she has had some abdominal discomfort. States that the pain was left lower and now it is epigastric. Had some mild relief with one nexium yesterday. Has had a lot of gas as well.   Abdominal Pain   This is a new problem. The current episode started in the past 7 days. The onset quality is gradual. The problem occurs constantly. The pain is located in the epigastric region. The pain is mild. The quality of the pain is aching and a sensation of fullness. The abdominal pain does not radiate. Associated symptoms include belching and flatus. Pertinent negatives include no anorexia, arthralgias, constipation, diarrhea, dysuria, fever, frequency, headaches, hematochezia, hematuria, melena, myalgias, nausea, vomiting or weight loss. Nothing aggravates the pain. The pain is relieved by nothing. She has tried antacids for the symptoms. The treatment provided mild relief. There is no history of abdominal surgery, colon cancer, Crohn's disease, gallstones, GERD, irritable bowel syndrome, pancreatitis, PUD or ulcerative colitis. Patient's medical history does not include kidney stones and UTI.       Review of Systems   Constitutional: Negative for activity change, appetite change, chills, diaphoresis, fatigue, fever, unexpected weight change and weight loss.   HENT: Negative.  Negative for congestion, hearing loss, postnasal drip, rhinorrhea, sore throat, trouble swallowing and voice change.    Eyes: Negative.  Negative for visual disturbance.   Respiratory: Negative.  Negative for cough, choking, chest tightness and shortness of breath.    Cardiovascular: Negative for chest pain, palpitations and leg swelling.   Gastrointestinal: Positive for abdominal distention, abdominal pain and flatus. Negative  "for anal bleeding, anorexia, blood in stool, constipation, diarrhea, hematochezia, melena, nausea, rectal pain and vomiting.   Endocrine: Negative for cold intolerance, heat intolerance, polydipsia and polyuria.   Genitourinary: Negative.  Negative for difficulty urinating, dysuria, frequency and hematuria.   Musculoskeletal: Negative for arthralgias, back pain, gait problem, joint swelling and myalgias.   Skin: Negative for color change, pallor, rash and wound.   Neurological: Negative for dizziness, tremors, weakness, light-headedness, numbness and headaches.   Hematological: Negative for adenopathy.   Psychiatric/Behavioral: Negative for behavioral problems, confusion, self-injury, sleep disturbance and suicidal ideas. The patient is not nervous/anxious.      Objective:   /76   Pulse 86   Temp 99.4 °F (37.4 °C) (Tympanic)   Ht 5' 4" (1.626 m)   Wt 104.7 kg (230 lb 13.2 oz)   LMP 01/07/2015   SpO2 99%   BMI 39.62 kg/m²     Physical Exam   Constitutional: She is oriented to person, place, and time. She appears well-developed and well-nourished.   HENT:   Head: Normocephalic and atraumatic.   Right Ear: External ear normal.   Left Ear: External ear normal.   Nose: Nose normal.   Mouth/Throat: Oropharynx is clear and moist.   Eyes: Pupils are equal, round, and reactive to light. Conjunctivae and EOM are normal.   Neck: Normal range of motion. Neck supple.   Cardiovascular: Normal rate, regular rhythm and normal heart sounds. Exam reveals no gallop and no friction rub.   No murmur heard.  Pulmonary/Chest: Effort normal and breath sounds normal. No respiratory distress. She has no wheezes. She has no rales. She exhibits no tenderness.   Abdominal: Soft. Normal appearance and bowel sounds are normal. She exhibits no distension and no mass. There is tenderness in the epigastric area. There is no rebound, no guarding, no CVA tenderness and negative Canada's sign. No hernia.   Musculoskeletal: Normal range of " motion.   Lymphadenopathy:     She has no cervical adenopathy.   Neurological: She is alert and oriented to person, place, and time.   Skin: Skin is warm and dry.   Psychiatric: She has a normal mood and affect. Her behavior is normal. Judgment and thought content normal.   Vitals reviewed.      Assessment:     1. Gastroesophageal reflux disease, esophagitis presence not specified    2. Epigastric pain      Plan:   Gastroesophageal reflux disease, esophagitis presence not specified    Epigastric pain    -nexium or pepcid daily  -increase fluids (8 8ounce glasses of water a day minimum)  -increase fiber  -Educational handout on over-the-counter medications and at-home conservative care, pertinent to the patients diagnosis today, was handed to the patient and discussed in detail.  -keep us updated via [employer]. If no improvement, will consider labs and imaging.     Follow up if symptoms worsen or fail to improve.

## 2019-04-26 ENCOUNTER — PATIENT MESSAGE (OUTPATIENT)
Dept: FAMILY MEDICINE | Facility: CLINIC | Age: 54
End: 2019-04-26

## 2019-04-26 DIAGNOSIS — F41.9 ANXIETY AND DEPRESSION: ICD-10-CM

## 2019-04-26 DIAGNOSIS — F32.A ANXIETY AND DEPRESSION: ICD-10-CM

## 2019-04-26 RX ORDER — BUPROPION HYDROCHLORIDE 300 MG/1
300 TABLET ORAL DAILY
Qty: 90 TABLET | Refills: 3 | Status: SHIPPED | OUTPATIENT
Start: 2019-04-26 | End: 2020-04-22 | Stop reason: SDUPTHER

## 2019-04-29 ENCOUNTER — PATIENT MESSAGE (OUTPATIENT)
Dept: FAMILY MEDICINE | Facility: CLINIC | Age: 54
End: 2019-04-29

## 2019-05-06 ENCOUNTER — OFFICE VISIT (OUTPATIENT)
Dept: DERMATOLOGY | Facility: CLINIC | Age: 54
End: 2019-05-06
Payer: COMMERCIAL

## 2019-05-06 DIAGNOSIS — L71.0 PERIORAL DERMATITIS DUE TO CORTICOSTEROID: ICD-10-CM

## 2019-05-06 DIAGNOSIS — L98.9 SKIN EROSION: ICD-10-CM

## 2019-05-06 DIAGNOSIS — T38.0X5A PERIORAL DERMATITIS DUE TO CORTICOSTEROID: ICD-10-CM

## 2019-05-06 DIAGNOSIS — L23.7 ALLERGIC CONTACT DERMATITIS DUE TO PLANTS, EXCEPT FOOD: Primary | ICD-10-CM

## 2019-05-06 DIAGNOSIS — L73.9 FOLLICULITIS: ICD-10-CM

## 2019-05-06 PROCEDURE — 99999 PR PBB SHADOW E&M-EST. PATIENT-LVL II: ICD-10-PCS | Mod: PBBFAC,,, | Performed by: PHYSICIAN ASSISTANT

## 2019-05-06 PROCEDURE — 99213 PR OFFICE/OUTPT VISIT, EST, LEVL III, 20-29 MIN: ICD-10-PCS | Mod: S$GLB,,, | Performed by: PHYSICIAN ASSISTANT

## 2019-05-06 PROCEDURE — 99999 PR PBB SHADOW E&M-EST. PATIENT-LVL II: CPT | Mod: PBBFAC,,, | Performed by: PHYSICIAN ASSISTANT

## 2019-05-06 PROCEDURE — 99213 OFFICE O/P EST LOW 20 MIN: CPT | Mod: S$GLB,,, | Performed by: PHYSICIAN ASSISTANT

## 2019-05-06 RX ORDER — BETAMETHASONE DIPROPIONATE 0.5 MG/G
CREAM TOPICAL 2 TIMES DAILY
Qty: 30 G | Refills: 0 | Status: SHIPPED | OUTPATIENT
Start: 2019-05-06 | End: 2020-01-03

## 2019-05-06 NOTE — PROGRESS NOTES
Subjective:       Patient ID:  Taryn Serrano is a 53 y.o. female who presents for   Chief Complaint   Patient presents with    Rash     f/u on rash to face,,+ improvement with rx meds     Hx of perioral dermatitis, scalp folliculitis, last seen 3/6/19. Also here today for new c/o  History of Present Illness: The patient presents with chief complaint of rash. Admits to recent exposure poison oak.  History of poison oak/poison ivy rash in the past.   Location: right abdomen  Duration: 3 days  Signs/Symptoms: red, itchy, rough; denies tingling or burning or blistering    Prior treatments: betamethasone diproprionate 0.05% cream with some improvement    For perioral dermatitis, current tx: metronidazole 0.75% cream with improvement, also weaning inhaled steroid.    For scalp folliculitis, current tx: clinda solution bid prn with improvement.        Review of Systems   Constitutional: Negative for fever and chills.   Gastrointestinal: Negative for nausea and vomiting.   Skin: Positive for itching, rash, daily sunscreen use and activity-related sunscreen use. Negative for dry skin, sun sensitivity and recent sunburn.   Hematologic/Lymphatic: Does not bruise/bleed easily.        Objective:    Physical Exam   Constitutional: She appears well-developed and well-nourished. No distress.   Neurological: She is alert and oriented to person, place, and time. She is not disoriented.   Psychiatric: She has a normal mood and affect.   Skin:   Areas Examined (abnormalities noted in diagram):   Scalp / Hair Palpated and Inspected  Head / Face Inspection Performed  Neck Inspection Performed  Chest / Axilla Inspection Performed  Abdomen Inspection Performed  Back Inspection Performed  RUE Inspected  LUE Inspection Performed                   Diagram Legend     Erythematous scaling macule/papule c/w actinic keratosis       Vascular papule c/w angioma      Pigmented verrucoid papule/plaque c/w seborrheic keratosis      Yellow  umbilicated papule c/w sebaceous hyperplasia      Irregularly shaped tan macule c/w lentigo     1-2 mm smooth white papules consistent with Milia      Movable subcutaneous cyst with punctum c/w epidermal inclusion cyst      Subcutaneous movable cyst c/w pilar cyst      Firm pink to brown papule c/w dermatofibroma      Pedunculated fleshy papule(s) c/w skin tag(s)      Evenly pigmented macule c/w junctional nevus     Mildly variegated pigmented, slightly irregular-bordered macule c/w mildly atypical nevus      Flesh colored to evenly pigmented papule c/w intradermal nevus       Pink pearly papule/plaque c/w basal cell carcinoma      Erythematous hyperkeratotic cursted plaque c/w SCC      Surgical scar with no sign of skin cancer recurrence      Open and closed comedones      Inflammatory papules and pustules      Verrucoid papule consistent consistent with wart     Erythematous eczematous patches and plaques     Dystrophic onycholytic nail with subungual debris c/w onychomycosis     Umbilicated papule    Erythematous-base heme-crusted tan verrucoid plaque consistent with inflamed seborrheic keratosis     Erythematous Silvery Scaling Plaque c/w Psoriasis     See annotation      Assessment / Plan:        Allergic contact dermatitis due to plants, except food  -     betamethasone dipropionate (DIPROLENE) 0.05 % cream; Apply topically 2 (two) times daily. PRN rash. Strong steroid- do not apply to face or folds of skin  Dispense: 30 g; Refill: 0  Discussed contact precautions and frequent hand washing.  Will start above med bid prn.     Perioral dermatitis due to corticosteroid  Improved, will continue metro 0.75% cream qd and continue to wean inhaled corticosteroid as tolerated. She understands to reserve steroid prn.    Folliculitis  Skin erosion  Improved, will continue clinda solution bid prn flares.     Benign nevus  Reviewed ABCDEs moles and encouraged regular mole monitoring. Continue daily sunscreen with spf 30.  Recommend mole exam with MD in the next 9-12 months, or sooner for any changes.         Follow up in about 3 months (around 8/6/2019) for rosacea.

## 2019-08-06 NOTE — PROGRESS NOTES
"Subjective:      Patient ID: Taryn Serrano is a 54 y.o. female.    Chief Complaint: Follow-up      HPI  Here for follow up of medical problems.  Feels better on increased wellbutrin plus the prozac.  Fatigue has improved, doesn't feel anxious lately.  Sleeping ok.  No exercise lately.  Active.  Says can't exercise due to bilateral foot pain.  Saw Podiatry 8mo ago, uses special shoes and inserts.  Breathing well, albuterol works well with exertional asthma.    BMs normal.      Updated/ annual due 3/20:  HM: 10/18 fluvax, 3/19 HAV, 1/15 [id number], 10/13 [id number] booster, 3/19 TDaP, 3/18 MMG/ Gyn Dr. Alfonso, 1/16 BMD low fx risk rep 5y, 7/12 Cscope rep 10y, 2/17 HCV neg.     Review of Systems   Constitutional: Negative for chills, diaphoresis and fever.   Respiratory: Negative for cough and shortness of breath.    Cardiovascular: Negative for chest pain, palpitations and leg swelling.   Gastrointestinal: Negative for blood in stool, constipation, diarrhea, nausea and vomiting.   Genitourinary: Negative for dysuria, frequency and hematuria.   Psychiatric/Behavioral: The patient is not nervous/anxious.          Objective:   /82   Pulse 89   Temp 98.7 °F (37.1 °C)   Ht 5' 4" (1.626 m)   Wt 100.9 kg (222 lb 7.1 oz)   LMP 01/07/2015   SpO2 95%   BMI 38.18 kg/m²     Physical Exam   Constitutional: She is oriented to person, place, and time. She appears well-developed.   HENT:   Mouth/Throat: Oropharynx is clear and moist.   Neck: Neck supple. Carotid bruit is not present. No thyroid mass present.   Cardiovascular: Normal rate, regular rhythm and intact distal pulses. Exam reveals no gallop and no friction rub.   No murmur heard.  Pulmonary/Chest: Effort normal and breath sounds normal. She has no wheezes. She has no rales.   Abdominal: Soft. Bowel sounds are normal. She exhibits no mass. There is no hepatosplenomegaly. There is no tenderness.   Musculoskeletal: She exhibits no edema.   Lymphadenopathy:     " She has no cervical adenopathy.   Neurological: She is alert and oriented to person, place, and time.   Psychiatric: She has a normal mood and affect.           Assessment:       1. Anxiety and depression    2. Acquired hypothyroidism    3. Asthma, exercise induced    4. CKD (chronic kidney disease) stage 3, GFR 30-59 ml/min    5. Pain in both feet    6. Preventive measure          Plan:     Anxiety and depression- doing well, cont rxs.    Acquired hypothyroidism- Clinically stable, continue present treatment.    Asthma, exercise induced- cont alb.    CKD (chronic kidney disease) stage 3, GFR 30-59 ml/min- BP good control, recheck 6mo.    Pain in both feet  -     Ambulatory referral to Podiatry    Preventive measure- will do in 6mo:  -     CBC auto differential; Future; Expected date: 08/20/2019  -     Comprehensive metabolic panel; Future; Expected date: 08/20/2019  -     Lipid panel; Future; Expected date: 08/20/2019  -     TSH; Future; Expected date: 08/20/2019  -     Vitamin D; Future  -     Hemoglobin A1c; Future; Expected date: 08/20/2019

## 2019-08-20 ENCOUNTER — PATIENT MESSAGE (OUTPATIENT)
Dept: FAMILY MEDICINE | Facility: CLINIC | Age: 54
End: 2019-08-20

## 2019-08-20 ENCOUNTER — OFFICE VISIT (OUTPATIENT)
Dept: FAMILY MEDICINE | Facility: CLINIC | Age: 54
End: 2019-08-20
Payer: COMMERCIAL

## 2019-08-20 VITALS
OXYGEN SATURATION: 95 % | HEART RATE: 89 BPM | SYSTOLIC BLOOD PRESSURE: 121 MMHG | BODY MASS INDEX: 37.98 KG/M2 | TEMPERATURE: 99 F | HEIGHT: 64 IN | DIASTOLIC BLOOD PRESSURE: 82 MMHG | WEIGHT: 222.44 LBS

## 2019-08-20 DIAGNOSIS — M79.671 PAIN IN BOTH FEET: ICD-10-CM

## 2019-08-20 DIAGNOSIS — F32.A ANXIETY AND DEPRESSION: Primary | ICD-10-CM

## 2019-08-20 DIAGNOSIS — F41.9 ANXIETY AND DEPRESSION: Primary | ICD-10-CM

## 2019-08-20 DIAGNOSIS — J45.990 ASTHMA, EXERCISE INDUCED: ICD-10-CM

## 2019-08-20 DIAGNOSIS — M79.672 PAIN IN BOTH FEET: ICD-10-CM

## 2019-08-20 DIAGNOSIS — N18.30 CKD (CHRONIC KIDNEY DISEASE) STAGE 3, GFR 30-59 ML/MIN: ICD-10-CM

## 2019-08-20 DIAGNOSIS — Z29.9 PREVENTIVE MEASURE: ICD-10-CM

## 2019-08-20 DIAGNOSIS — E03.9 ACQUIRED HYPOTHYROIDISM: ICD-10-CM

## 2019-08-20 PROCEDURE — 99999 PR PBB SHADOW E&M-EST. PATIENT-LVL IV: ICD-10-PCS | Mod: PBBFAC,,, | Performed by: INTERNAL MEDICINE

## 2019-08-20 PROCEDURE — 99214 PR OFFICE/OUTPT VISIT, EST, LEVL IV, 30-39 MIN: ICD-10-PCS | Mod: S$GLB,,, | Performed by: INTERNAL MEDICINE

## 2019-08-20 PROCEDURE — 3008F BODY MASS INDEX DOCD: CPT | Mod: CPTII,S$GLB,, | Performed by: INTERNAL MEDICINE

## 2019-08-20 PROCEDURE — 3008F PR BODY MASS INDEX (BMI) DOCUMENTED: ICD-10-PCS | Mod: CPTII,S$GLB,, | Performed by: INTERNAL MEDICINE

## 2019-08-20 PROCEDURE — 99214 OFFICE O/P EST MOD 30 MIN: CPT | Mod: S$GLB,,, | Performed by: INTERNAL MEDICINE

## 2019-08-20 PROCEDURE — 99999 PR PBB SHADOW E&M-EST. PATIENT-LVL IV: CPT | Mod: PBBFAC,,, | Performed by: INTERNAL MEDICINE

## 2019-08-30 ENCOUNTER — OFFICE VISIT (OUTPATIENT)
Dept: PODIATRY | Facility: CLINIC | Age: 54
End: 2019-08-30
Payer: COMMERCIAL

## 2019-08-30 VITALS — WEIGHT: 221.81 LBS | RESPIRATION RATE: 17 BRPM | HEIGHT: 64 IN | BODY MASS INDEX: 37.87 KG/M2

## 2019-08-30 DIAGNOSIS — M20.11 HALLUX VALGUS (ACQUIRED), RIGHT FOOT: Primary | ICD-10-CM

## 2019-08-30 DIAGNOSIS — M24.572 CONTRACTURE, LEFT ANKLE: ICD-10-CM

## 2019-08-30 DIAGNOSIS — M21.622 TAILOR'S BUNIONETTE, BILATERAL: ICD-10-CM

## 2019-08-30 DIAGNOSIS — M20.12 HALLUX VALGUS (ACQUIRED), LEFT FOOT: ICD-10-CM

## 2019-08-30 DIAGNOSIS — E66.01 SEVERE OBESITY (BMI 35.0-39.9) WITH COMORBIDITY: ICD-10-CM

## 2019-08-30 DIAGNOSIS — M24.571 CONTRACTURE, RIGHT ANKLE: ICD-10-CM

## 2019-08-30 DIAGNOSIS — M20.42 HAMMER TOES OF BOTH FEET: ICD-10-CM

## 2019-08-30 DIAGNOSIS — M20.41 HAMMER TOES OF BOTH FEET: ICD-10-CM

## 2019-08-30 DIAGNOSIS — M21.621 TAILOR'S BUNIONETTE, BILATERAL: ICD-10-CM

## 2019-08-30 PROCEDURE — 99999 PR PBB SHADOW E&M-EST. PATIENT-LVL II: CPT | Mod: PBBFAC,,, | Performed by: PODIATRIST

## 2019-08-30 PROCEDURE — 3008F PR BODY MASS INDEX (BMI) DOCUMENTED: ICD-10-PCS | Mod: CPTII,S$GLB,, | Performed by: PODIATRIST

## 2019-08-30 PROCEDURE — 3008F BODY MASS INDEX DOCD: CPT | Mod: CPTII,S$GLB,, | Performed by: PODIATRIST

## 2019-08-30 PROCEDURE — 99214 OFFICE O/P EST MOD 30 MIN: CPT | Mod: S$GLB,,, | Performed by: PODIATRIST

## 2019-08-30 PROCEDURE — 99214 PR OFFICE/OUTPT VISIT, EST, LEVL IV, 30-39 MIN: ICD-10-PCS | Mod: S$GLB,,, | Performed by: PODIATRIST

## 2019-08-30 PROCEDURE — 99999 PR PBB SHADOW E&M-EST. PATIENT-LVL II: ICD-10-PCS | Mod: PBBFAC,,, | Performed by: PODIATRIST

## 2019-08-30 NOTE — PROGRESS NOTES
Subjective:       Patient ID: Taryn Serrano is a 54 y.o. female.    Chief Complaint: Foot Problem (Metatarsalgia of both feet, rates pain 0/10 wear tennis, non-diabetic, PCP Dr. pardo ); Nail Problem (fungus on toes, ); and Hammer Toe (Right and left )      HPI: Taryn Serrano presents to the office today with complaints of mild-to-moderate pains to the right and left foot at the great toe due to bunion deformity. Patient states pains are recalcitrant to NSAID therapy and wider width shoe gear. Patient has no had injection therapy to the 1st MTPJ of either limb. Patient has had discomfort to the great toe for the past several months. Patient is indeed interested in surgical intervention and/or cure for alleviation of symptoms. Patient's Primary Care Provider is Alyce Pardo MD.  Patient also states painful hammertoes of the bilateral foot.    Review of patient's allergies indicates:   Allergen Reactions    Grass pollen-perennial rye, standard Shortness Of Breath    Horsetail (equisetum arvense) Shortness Of Breath    Mold extracts Shortness Of Breath       Past Medical History:   Diagnosis Date    Anxiety and depression     Asthma, exercise induced     Dry eyes     History of ADHD     Hypothyroid     URBAN (obstructive sleep apnea)     Vitamin D deficiency        Family History   Problem Relation Age of Onset    Cataracts Father     Alzheimer's disease Father     Colon cancer Father     Heart failure Mother     Melanoma Neg Hx     Psoriasis Neg Hx     Lupus Neg Hx     Eczema Neg Hx     Acne Neg Hx     Breast cancer Neg Hx     Ovarian cancer Neg Hx     Thrombophilia Neg Hx        Social History     Socioeconomic History    Marital status:      Spouse name: Not on file    Number of children: 2    Years of education: Not on file    Highest education level: Not on file   Occupational History     Employer: [employer] Needs    Financial resource strain:  Not on file    Food insecurity:     Worry: Not on file     Inability: Not on file    Transportation needs:     Medical: Not on file     Non-medical: Not on file   Tobacco Use    Smoking status: Never Smoker    Smokeless tobacco: Never Used   Substance and Sexual Activity    Alcohol use: Yes     Comment: social    Drug use: No    Sexual activity: Not Currently   Lifestyle    Physical activity:     Days per week: Not on file     Minutes per session: Not on file    Stress: Not at all   Relationships    Social connections:     Talks on phone: Not on file     Gets together: Not on file     Attends [employer] service: Not on file     Active member of club or organization: Not on file     Attends meetings of clubs or organizations: Not on file     Relationship status: Not on file   Other Topics Concern    Are you pregnant or think you may be? No    Breast-feeding No   Social History Narrative    Full time emplyed.       Past Surgical History:   Procedure Laterality Date    ANKLE SURGERY Right     ARTHROSCOPY-MENISCECTOMY Left 12/14/2017    Performed by [facility]an [facility]aly [facility] at [facility] OR    CARPAL TUNNEL RELEASE Bilateral     CATARACT EXTRACTION BILATERAL W/ ANTERIOR VITRECTOMY      CATARACT EXTRACTION W/  INTRAOCULAR LENS IMPLANT  OU    KNEE SURGERY      right    OPEN REDUCTION INTERNAL FIXATION-ANKLE Right 7/7/2015    Performed by Mavis Juarez DPM at [facility] OR    PLACEMENT-GRAFT Left 12/14/2017    [facility] at [facility]    RECONSTRUCTION-LIGAMENT ANTERIOR CRUCIATE-ARTHROSCOPIC Left 12/14/2017    Performed by [facility]aly [facility] at [facility] OR       Review of Systems   Constitutional: Negative for chills, fatigue and fever.   HENT: Negative for hearing loss.    Eyes: Negative for photophobia and visual disturbance.   Respiratory: Negative for cough, chest tightness, shortness of breath and wheezing.    Cardiovascular: Negative for chest pain, palpitations and leg swelling.   Gastrointestinal:  "Negative for constipation, diarrhea, nausea and vomiting.   Endocrine: Negative for cold intolerance and heat intolerance.   Genitourinary: Negative for flank pain.   Musculoskeletal: Positive for arthralgias, back pain and gait problem. Negative for neck pain and neck stiffness.   Skin: Negative for wound.   Neurological: Negative for light-headedness, numbness and headaches.   Psychiatric/Behavioral: Negative for sleep disturbance.         Objective:   Resp 17   Ht 5' 4" (1.626 m)   Wt 100.6 kg (221 lb 12.5 oz)   LMP 01/07/2015   BMI 38.07 kg/m²     X-Ray Foot Complete Bilateral  Narrative: EXAMINATION:  XR FOOT COMPLETE 3 VIEW BILATERAL    CLINICAL HISTORY:  Pain in right foot    TECHNIQUE:  AP, lateral, and oblique views of both feet were performed.    COMPARISON:  None    FINDINGS:  Right: Postoperative findings noted involving the distal fibula.  No acute fractures or dislocations visualized in the foot.  There is moderate hallux valgus deformity with mild associated degenerative changes at the great toe MTP joint.  Mild associated soft tissue prominence at the 1st metatarsal head.  No erosive changes demonstrated.  Suggestion of possible flexion extension deformity involving multiple toes, correlate clinically.    Left: No acute fractures or dislocations visualized.  There is moderate hallux valgus deformity with mild associated degenerative changes at the great toe MTP joint.  There is overlying soft tissue prominence at the level of the 1st metatarsal head.No erosive changes demonstrated.  There is suggestion of flexion/extension deformities involving multiple toes, correlate clinically.  Impression: As above.    Electronically signed by: Thong Mays MD  Date:    11/06/2018  Time:    08:48      Physical Exam  LOWER EXTREMITY PHYSICAL EXAMINATION    VASCULAR: On the right and left foot, the dorsalis pedis pulse is 2/4 and the posterior tibial pulse is 2/4. Capillary refill time is less than 3 " seconds. Hair growth is present on the dorsum of the foot and at the digits. Proximal to distal temperature is warm to warm.    ORTHOPEDIC (left and right):  Moderate bunion deformity is noted to the right foot. Upon ROM in the sagittal plan, there is mild pain related at the end ROM, dorsally; no plantar pains at the 1st MTPJ are stated. No pains to palpation of the tibial or the fibular sesamoid. There is a mildly size medial eminence appreciated. There is minimal dorsal spurring noted. Palpation of the dorsal-lateral aspect of the 1st MTPJ is slightly painful. Hallux abductus is noted.  Hallux interphalangeus is not noted. There is tracking. The hallux is trackbound. There is no hypermobility noted at the 1st TMTJ. Upon reduction of the IM angle at the 1st metatarsal head, the hallux is not rectus.  Bilateral bunionette deformities noted. Rigid hammertoe contractures, 2nd through 4th, bilateral. Equinus contractures noted.    DERMATOLOGY: Skin is supple, dry and intact. No ecchymosis is noted. No ulcerations are noted. Skin is supple and moist.     NEUROLOGY: Protective sensation is intact via 5.07 [place] Ivan monofilament. Proprioception is intact. Sensation to light touch is intact.     Assessment:     1. Hallux valgus (acquired), right foot    2. Hallux valgus (acquired), left foot    3. Hammer toes of both feet    4. Contracture, right ankle    5. Contracture, left ankle    6. Severe obesity (BMI 35.0-39.9) with comorbidity    7. Tailor's bunionette, bilateral          Plan:     Hallux valgus (acquired), right foot  Hallux valgus (acquired), left foot  Hammer toes of both feet  Contracture, right ankle  Contracture, left ankle  Tailor's bunionette, bilateral  Thorough discussion is had with the patient today, concerning the diagnosis, its etiology, and the treatment algorithm at present.    The procedure of (left the right foot, 1st metatarsophalangeal joint fusion, with 5th ray osteotomy (bunionectomy)  an arthrodesis of hammertoes, 2nd through 4th) was thoroughly explained to the patient. Its necessity and/or purpose and the implications therein were outlined, including any pertinent advantages and/or disadvantages, and possible complications, if any. Possible complications include recurrence of pathology and/or deformity, infection (cellulitis, drainage, purulence, malodor, etc...), pain, numbness, neuritis, edema, burning, loss of function, need for further surgery, possible need for removal of any implanted hardware, soft tissue contracture and/or scarring, etc... No guarantees were given and/or implied. Post-operative expectations and weightbearing protocol is thoroughly explained the patient, who acknowledges understanding.    Severe obesity (BMI 35.0-39.9) with comorbidity  Patient is counseled and reminded concerning the importance of good nutrition and healthy eating habits, which may include blood sugar control, to prevent and/or help podiatric foot and ankle complications.        Future Appointments   Date Time Provider Department [place]   2/11/2020  8:10 AM [facility]   2/18/2020  3:40 PM Alyce Schroeder MD [facility] [address]

## 2019-09-04 PROBLEM — E78.5 HYPERLIPIDEMIA, UNSPECIFIED: Status: ACTIVE | Noted: 2017-08-11

## 2019-09-22 DIAGNOSIS — F41.9 ANXIETY AND DEPRESSION: ICD-10-CM

## 2019-09-22 DIAGNOSIS — F32.A ANXIETY AND DEPRESSION: ICD-10-CM

## 2019-09-23 RX ORDER — FLUOXETINE HYDROCHLORIDE 40 MG/1
CAPSULE ORAL
Qty: 180 CAPSULE | Refills: 2 | Status: SHIPPED | OUTPATIENT
Start: 2019-09-23 | End: 2020-06-14

## 2019-10-20 RX ORDER — LEVOTHYROXINE SODIUM 112 UG/1
112 TABLET ORAL DAILY
Qty: 90 TABLET | Refills: 3 | Status: SHIPPED | OUTPATIENT
Start: 2019-10-20 | End: 2020-10-22 | Stop reason: SDUPTHER

## 2019-12-18 ENCOUNTER — PATIENT MESSAGE (OUTPATIENT)
Dept: FAMILY MEDICINE | Facility: CLINIC | Age: 54
End: 2019-12-18

## 2019-12-26 ENCOUNTER — OFFICE VISIT (OUTPATIENT)
Dept: OPHTHALMOLOGY | Facility: CLINIC | Age: 54
End: 2019-12-26
Payer: COMMERCIAL

## 2019-12-26 DIAGNOSIS — Z13.5 GLAUCOMA SCREENING: ICD-10-CM

## 2019-12-26 DIAGNOSIS — H52.13 MYOPIA WITH PRESBYOPIA OF BOTH EYES: ICD-10-CM

## 2019-12-26 DIAGNOSIS — H52.4 MYOPIA WITH PRESBYOPIA OF BOTH EYES: ICD-10-CM

## 2019-12-26 DIAGNOSIS — Z01.00 ENCOUNTER FOR EXAMINATION OF EYES AND VISION WITHOUT ABNORMAL FINDINGS: Primary | ICD-10-CM

## 2019-12-26 PROCEDURE — 92004 PR EYE EXAM, NEW PATIENT,COMPREHESV: ICD-10-PCS | Mod: S$GLB,,, | Performed by: OPTOMETRIST

## 2019-12-26 PROCEDURE — 99999 PR PBB SHADOW E&M-EST. PATIENT-LVL III: CPT | Mod: PBBFAC,,, | Performed by: OPTOMETRIST

## 2019-12-26 PROCEDURE — 92015 DETERMINE REFRACTIVE STATE: CPT | Mod: S$GLB,,, | Performed by: OPTOMETRIST

## 2019-12-26 PROCEDURE — 99999 PR PBB SHADOW E&M-EST. PATIENT-LVL III: ICD-10-PCS | Mod: PBBFAC,,, | Performed by: OPTOMETRIST

## 2019-12-26 PROCEDURE — 92004 COMPRE OPH EXAM NEW PT 1/>: CPT | Mod: S$GLB,,, | Performed by: OPTOMETRIST

## 2019-12-26 PROCEDURE — 92015 PR REFRACTION: ICD-10-PCS | Mod: S$GLB,,, | Performed by: OPTOMETRIST

## 2020-01-01 ENCOUNTER — PATIENT MESSAGE (OUTPATIENT)
Dept: FAMILY MEDICINE | Facility: CLINIC | Age: 55
End: 2020-01-01

## 2020-01-02 NOTE — PROGRESS NOTES
Subjective:       Patient ID: Taryn Serrano is a 54 y.o. female.    Chief Complaint   Patient presents with    Ear Fullness       Ear Fullness    There is pain in the left ear. This is a new problem. The current episode started 1 to 4 weeks ago. The problem has been gradually worsening. There has been no fever. The pain is at a severity of 2/10. Pertinent negatives include no ear discharge, headaches, hearing loss or rhinorrhea. She has tried nothing for the symptoms. There is no history of a chronic ear infection, hearing loss or a tympanostomy tube.         Review of Systems   Constitutional: Negative for chills and fever.   HENT: Positive for ear pain. Negative for congestion, ear discharge, hearing loss, rhinorrhea, sinus pain, tinnitus and trouble swallowing.    Respiratory: Negative.    Cardiovascular: Negative.    Neurological: Positive for light-headedness. Negative for headaches.   Hematological: Negative for adenopathy.         Review of patient's allergies indicates:   Allergen Reactions    Grass pollen-perennial rye, standard Shortness Of Breath    Horsetail (equisetum arvense) Shortness Of Breath    Mold extracts Shortness Of Breath         Medication List with Changes/Refills   New Medications    CIPROFLOXACIN-DEXAMETHASONE 0.3-0.1% (CIPRODEX) 0.3-0.1 % DRPS    Place 4 drops into the left ear 2 (two) times daily.   Current Medications    BUPROPION (WELLBUTRIN XL) 300 MG 24 HR TABLET    Take 1 tablet (300 mg total) by mouth once daily.    CHOLECALCIFEROL, VITAMIN D3, 2,000 UNIT CAP    Take 1 capsule (2,000 Units total) by mouth once daily.    ESOMEPRAZOLE (NEXIUM) 40 MG CAPSULE    Take 1 capsule (40 mg total) by mouth before breakfast. daily PRN    FLUOXETINE 40 MG CAPSULE    TAKE 2 CAPSULES (80 MG TOTAL) BY MOUTH ONCE DAILY.    FLUTICASONE (FLONASE) 50 MCG/ACTUATION NASAL SPRAY    2 sprays (100 mcg total) by Each Nare route once daily.    FLUTICASONE-SALMETEROL 500-50 MCG/DOSE (ADVAIR DISKUS)  500-50 MCG/DOSE DSDV DISKUS INHALER    Inhale 1 puff into the lungs 2 (two) times daily.    LEVOTHYROXINE (SYNTHROID) 112 MCG TABLET    TAKE 1 TABLET (112 MCG TOTAL) BY MOUTH ONCE DAILY.    MELOXICAM (MOBIC) 15 MG TABLET    TAKE ONE PILL DAILY AS NEEDED FOR PAIN    METRONIDAZOLE 0.75% (METROCREAM) 0.75 % CREA    Apply topically 2 (two) times daily.    TURMERIC ROOT EXTRACT 500 MG CAP    Take 1,000 capsules by mouth once daily.    VENTOLIN HFA 90 MCG/ACTUATION INHALER    INHALE 1 TO 2 PUFFS BY MOUTH EVERY 6 HOURS AS NEEDED FOR WHEEZE   Discontinued Medications    ALPRAZOLAM (XANAX) 0.25 MG TABLET    Take 1 tablet (0.25 mg total) by mouth 2 (two) times daily as needed for Anxiety.    BETAMETHASONE DIPROPIONATE (DIPROLENE) 0.05 % CREAM    Apply topically 2 (two) times daily. PRN rash. Strong steroid- do not apply to face or folds of skin    CLINDAMYCIN (CLEOCIN T) 1 % EXTERNAL SOLUTION    AAA of scalp bid       Patient Active Problem List   Diagnosis    Circadian rhythm sleep disorder, irregular sleep-wake type    Paving stone degeneration of peripheral retina    Periodic limb movement disorder    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    Asthma, exercise induced    URBAN (obstructive sleep apnea)    Acquired hypothyroidism    Anxiety and depression    Vitamin D deficiency    Pseudophakia of both eyes    Dry eye    PCO (posterior capsular opacification)    Obesity, Class II, BMI 35-39.9, no comorbidity    Synovial cyst of left popliteal space    Left ACL tear    Tear of medial meniscus of left knee    Hyperlipidemia, unspecified    CKD (chronic kidney disease) stage 3, GFR 30-59 ml/min         Past medical, surgical, family and social histories have been reviewed today.        Objective:     Vitals:    01/03/20 0818   BP: 134/74   Pulse: 79   Temp: 98.1 °F (36.7 °C)   TempSrc: Oral   SpO2: 97%   Weight: 102.4 kg (225 lb 12 oz)   PainSc:   2   PainLoc: Ear         Physical Exam    Constitutional: She is oriented to person, place, and time. She appears well-developed and well-nourished.   HENT:   Head: Normocephalic and atraumatic.   Right Ear: Tympanic membrane normal.   Left Ear: Tympanic membrane normal. There is swelling and tenderness. No drainage. No foreign bodies.   Nose: Nose normal.   Mouth/Throat: Oropharynx is clear and moist and mucous membranes are normal.   Eyes: Pupils are equal, round, and reactive to light. EOM are normal.   Cardiovascular: Normal rate and regular rhythm.   Pulmonary/Chest: Effort normal and breath sounds normal.   Lymphadenopathy:     She has no cervical adenopathy.   Neurological: She is alert and oriented to person, place, and time.   Psychiatric: She has a normal mood and affect. Her behavior is normal. Judgment and thought content normal.   Vitals reviewed.        Diagnosis       1. Acute otitis externa of left ear, unspecified type          Assessment/ Plan     Acute otitis externa of left ear, unspecified type  -     ciprofloxacin-dexamethasone 0.3-0.1% (CIPRODEX) 0.3-0.1 % DrpS; Place 4 drops into the left ear 2 (two) times daily.  Dispense: 7.5 mL; Refill: 0      Medication discussed, as directed.  No q-tips in ear for cleaning.  Follow-up in clinic as needed.        Patient Care Team:  Alyce Schroeder MD as PCP - General (Internal Medicine)  Fito Ragsdale LPN as Care Coordinator      MARQUITA Boswell  [facility]

## 2020-01-03 ENCOUNTER — OFFICE VISIT (OUTPATIENT)
Dept: FAMILY MEDICINE | Facility: CLINIC | Age: 55
End: 2020-01-03
Payer: COMMERCIAL

## 2020-01-03 VITALS
OXYGEN SATURATION: 97 % | DIASTOLIC BLOOD PRESSURE: 74 MMHG | SYSTOLIC BLOOD PRESSURE: 134 MMHG | TEMPERATURE: 98 F | HEART RATE: 79 BPM | BODY MASS INDEX: 38.75 KG/M2 | WEIGHT: 225.75 LBS

## 2020-01-03 DIAGNOSIS — H60.502 ACUTE OTITIS EXTERNA OF LEFT EAR, UNSPECIFIED TYPE: Primary | ICD-10-CM

## 2020-01-03 PROCEDURE — 99999 PR PBB SHADOW E&M-EST. PATIENT-LVL III: ICD-10-PCS | Mod: PBBFAC,,, | Performed by: REGISTERED NURSE

## 2020-01-03 PROCEDURE — 3008F PR BODY MASS INDEX (BMI) DOCUMENTED: ICD-10-PCS | Mod: CPTII,S$GLB,, | Performed by: REGISTERED NURSE

## 2020-01-03 PROCEDURE — 3008F BODY MASS INDEX DOCD: CPT | Mod: CPTII,S$GLB,, | Performed by: REGISTERED NURSE

## 2020-01-03 PROCEDURE — 99213 OFFICE O/P EST LOW 20 MIN: CPT | Mod: S$GLB,,, | Performed by: REGISTERED NURSE

## 2020-01-03 PROCEDURE — 99999 PR PBB SHADOW E&M-EST. PATIENT-LVL III: CPT | Mod: PBBFAC,,, | Performed by: REGISTERED NURSE

## 2020-01-03 PROCEDURE — 99213 PR OFFICE/OUTPT VISIT, EST, LEVL III, 20-29 MIN: ICD-10-PCS | Mod: S$GLB,,, | Performed by: REGISTERED NURSE

## 2020-01-03 RX ORDER — CIPROFLOXACIN AND DEXAMETHASONE 3; 1 MG/ML; MG/ML
4 SUSPENSION/ DROPS AURICULAR (OTIC) 2 TIMES DAILY
Qty: 7.5 ML | Refills: 0 | Status: SHIPPED | OUTPATIENT
Start: 2020-01-03 | End: 2020-08-18

## 2020-01-06 NOTE — PROGRESS NOTES
HPI     Annual Exam      Additional comments: Complete Ocular Exam              Comments     Last exam 12/10/2016 with DNL.  Needs new glasses.  Pseudophakia OU  PCO OU      Patient feels OU is doing well, at times OU feels dry, but patient does   sleep with a C-pap, no changes in VA and would like to get an updated RX   for glasses.            Last edited by Mariajose Coelho, OD on 1/6/2020  5:23 PM. (History)            Assessment /Plan     For exam results, see Encounter Report.    Encounter for examination of eyes and vision without abnormal findings    Glaucoma screening    Myopia with presbyopia of both eyes      Glaucoma screening and fundus exam normal.  Updated glasses prescription.  Return to clinic 1 yr.

## 2020-01-27 RX ORDER — ESOMEPRAZOLE MAGNESIUM 40 MG/1
40 CAPSULE, DELAYED RELEASE ORAL
Qty: 30 CAPSULE | Refills: 6 | Status: SHIPPED | OUTPATIENT
Start: 2020-01-27 | End: 2020-10-28 | Stop reason: SDUPTHER

## 2020-02-04 PROBLEM — E78.49 OTHER HYPERLIPIDEMIA: Status: ACTIVE | Noted: 2017-08-11

## 2020-02-04 NOTE — PROGRESS NOTES
Subjective:      Patient ID: Taryn Serrano is a 54 y.o. female.    Chief Complaint: Follow-up (6 mos )      HPI  Here for f/u medical problems and preventive exam.  Plans to start more exercise.  No f/c/sw.  Having head congestion x 2d.  Taking flonase only.  No cp/sob/palp.  Still albuterol prn working well.  BMs normal.  Urine normal.  Taking vit D on MWF.    Updated/ annual due 3/20:  HM: 12/19 fluvax, 3/19 HAV, 1/15 [id number], 10/13 [id number] booster, 3/19 TDaP, 3/18 MMG/ Gyn Dr. Alfonso, 1/16 BMD low fx risk rep 5y, 7/12 Cscope rep 10y, 2/17 HCV neg.         Review of Systems   Constitutional: Negative for appetite change, chills, diaphoresis and fever.   HENT: Negative for congestion, ear pain, rhinorrhea, sinus pressure and sore throat.    Respiratory: Negative for cough, chest tightness and shortness of breath.    Cardiovascular: Negative for chest pain and palpitations.   Gastrointestinal: Negative for blood in stool, constipation, diarrhea, nausea and vomiting.   Genitourinary: Negative for dysuria, frequency, hematuria, menstrual problem, urgency and vaginal discharge.   Musculoskeletal: Negative for arthralgias.   Skin: Negative for rash.   Neurological: Negative for dizziness and headaches.   Psychiatric/Behavioral: Negative for sleep disturbance. The patient is not nervous/anxious.          Objective:   BP 90/66   Pulse 80   Temp 98.6 °F (37 °C) (Tympanic)   Resp 20   Wt 102.7 kg (226 lb 6.6 oz)   LMP 01/07/2015   BMI 38.86 kg/m²     Physical Exam   Constitutional: She is oriented to person, place, and time. She appears well-developed and well-nourished.   HENT:   Right Ear: External ear normal. Tympanic membrane is not injected.   Left Ear: External ear normal. Tympanic membrane is not injected.   Mouth/Throat: Oropharynx is clear and moist.   Eyes: Conjunctivae are normal.   Neck: Normal range of motion. Neck supple. No thyromegaly present.   Cardiovascular: Normal rate, regular rhythm and  intact distal pulses. Exam reveals no gallop and no friction rub.   No murmur heard.  Pulmonary/Chest: Effort normal and breath sounds normal. She has no wheezes. She has no rales.   Abdominal: Soft. Bowel sounds are normal. She exhibits no mass. There is no tenderness.   Musculoskeletal: She exhibits no edema.   Lymphadenopathy:     She has no cervical adenopathy.   Neurological: She is alert and oriented to person, place, and time.   Skin: Skin is warm. No rash noted.   Psychiatric: She has a normal mood and affect.       Results for orders placed or performed in visit on 02/22/19   CBC auto differential   Result Value Ref Range    WBC 5.66 3.90 - 12.70 K/uL    RBC 5.00 4.00 - 5.40 M/uL    Hemoglobin 14.3 12.0 - 16.0 g/dL    Hematocrit 43.4 37.0 - 48.5 %    Mean Corpuscular Volume 87 82 - 98 fL    Mean Corpuscular Hemoglobin 28.6 27.0 - 31.0 pg    Mean Corpuscular Hemoglobin Conc 32.9 32.0 - 36.0 g/dL    RDW 13.4 11.5 - 14.5 %    Platelets 207 150 - 350 K/uL    MPV 9.7 9.2 - 12.9 fL    Immature Granulocytes 0.4 0.0 - 0.5 %    Gran # (ANC) 3.7 1.8 - 7.7 K/uL    Immature Grans (Abs) 0.02 0.00 - 0.04 K/uL    Lymph # 1.4 1.0 - 4.8 K/uL    Mono # 0.5 0.3 - 1.0 K/uL    Eos # 0.1 0.0 - 0.5 K/uL    Baso # 0.02 0.00 - 0.20 K/uL    nRBC 0 0 /100 WBC    Gran% 64.6 38.0 - 73.0 %    Lymph% 24.4 18.0 - 48.0 %    Mono% 8.8 4.0 - 15.0 %    Eosinophil% 1.4 0.0 - 8.0 %    Basophil% 0.4 0.0 - 1.9 %    Differential Method Automated    Comprehensive metabolic panel   Result Value Ref Range    Sodium 141 136 - 145 mmol/L    Potassium 5.0 3.5 - 5.1 mmol/L    Chloride 103 95 - 110 mmol/L    CO2 29 23 - 29 mmol/L    Glucose 87 70 - 110 mg/dL    BUN, Bld 20 6 - 20 mg/dL    Creatinine 1.1 0.5 - 1.4 mg/dL    Calcium 9.9 8.7 - 10.5 mg/dL    Total Protein 7.1 6.0 - 8.4 g/dL    Albumin 3.5 3.5 - 5.2 g/dL    Total Bilirubin 0.5 0.1 - 1.0 mg/dL    Alkaline Phosphatase 111 55 - 135 U/L    AST 18 10 - 40 U/L    ALT 19 10 - 44 U/L    Anion Gap 9 8 -  16 mmol/L    eGFR if African American >60.0 >60 mL/min/1.73 m^2    eGFR if non  56.7 (A) >60 mL/min/1.73 m^2   Lipid panel   Result Value Ref Range    Cholesterol 240 (H) 120 - 199 mg/dL    Triglycerides 162 (H) 30 - 150 mg/dL    HDL 67 40 - 75 mg/dL    LDL Cholesterol 140.6 63.0 - 159.0 mg/dL    Hdl/Cholesterol Ratio 27.9 20.0 - 50.0 %    Total Cholesterol/HDL Ratio 3.6 2.0 - 5.0    Non-HDL Cholesterol 173 mg/dL   TSH   Result Value Ref Range    TSH 2.529 0.400 - 4.000 uIU/mL   Vitamin D   Result Value Ref Range    Vit D, 25-Hydroxy 78 30 - 96 ng/mL         Assessment:       1. Encounter for preventive health examination    2. Acquired hypothyroidism    3. Anxiety and depression    4. Asthma, exercise induced    5. CKD (chronic kidney disease) stage 3, GFR 30-59 ml/min    6. Other hyperlipidemia    7. URBAN (obstructive sleep apnea)    8. Vitamin D deficiency          Plan:     Encounter for preventive health examination- lab/mmg/Gyn.  RTC 6mo.  -     Mammo Digital Screening Bilat; Future; Expected date: 02/18/2020  -     Ambulatory referral/consult to Gynecology; Future; Expected date: 02/25/2020  -     CBC auto differential; Future; Expected date: 02/18/2020  -     Comprehensive metabolic panel; Future; Expected date: 02/18/2020  -     Lipid panel; Future; Expected date: 02/18/2020  -     TSH; Future; Expected date: 02/18/2020  -     Vitamin D; Future  -     Hemoglobin A1c; Future; Expected date: 02/18/2020    Acquired hypothyroidism- Clinically stable, continue present treatment.  -     TSH; Future; Expected date: 02/18/2020    Anxiety and depression- doing well, cont rx.    Asthma, exercise induced- cont albuterol prn.    CKD (chronic kidney disease) stage 3, GFR 30-59 ml/min    Other hyperlipidemia- check lab now.    URBAN (obstructive sleep apnea)- doing well on cpap.    Vitamin D deficiency  -     Vitamin D; Future

## 2020-02-18 ENCOUNTER — OFFICE VISIT (OUTPATIENT)
Dept: FAMILY MEDICINE | Facility: CLINIC | Age: 55
End: 2020-02-18
Payer: COMMERCIAL

## 2020-02-18 ENCOUNTER — LAB VISIT (OUTPATIENT)
Dept: LAB | Facility: HOSPITAL | Age: 55
End: 2020-02-18
Payer: COMMERCIAL

## 2020-02-18 VITALS
RESPIRATION RATE: 20 BRPM | WEIGHT: 226.44 LBS | SYSTOLIC BLOOD PRESSURE: 90 MMHG | TEMPERATURE: 99 F | BODY MASS INDEX: 38.86 KG/M2 | DIASTOLIC BLOOD PRESSURE: 66 MMHG | HEART RATE: 80 BPM

## 2020-02-18 DIAGNOSIS — Z00.00 ENCOUNTER FOR PREVENTIVE HEALTH EXAMINATION: ICD-10-CM

## 2020-02-18 DIAGNOSIS — E78.49 OTHER HYPERLIPIDEMIA: ICD-10-CM

## 2020-02-18 DIAGNOSIS — E03.9 ACQUIRED HYPOTHYROIDISM: ICD-10-CM

## 2020-02-18 DIAGNOSIS — G47.33 OSA (OBSTRUCTIVE SLEEP APNEA): ICD-10-CM

## 2020-02-18 DIAGNOSIS — E55.9 VITAMIN D DEFICIENCY: ICD-10-CM

## 2020-02-18 DIAGNOSIS — F41.9 ANXIETY AND DEPRESSION: ICD-10-CM

## 2020-02-18 DIAGNOSIS — N18.30 CKD (CHRONIC KIDNEY DISEASE) STAGE 3, GFR 30-59 ML/MIN: ICD-10-CM

## 2020-02-18 DIAGNOSIS — F32.A ANXIETY AND DEPRESSION: ICD-10-CM

## 2020-02-18 DIAGNOSIS — Z00.00 ENCOUNTER FOR PREVENTIVE HEALTH EXAMINATION: Primary | ICD-10-CM

## 2020-02-18 DIAGNOSIS — J45.990 ASTHMA, EXERCISE INDUCED: ICD-10-CM

## 2020-02-18 LAB
ALBUMIN SERPL BCP-MCNC: 3.5 G/DL (ref 3.5–5.2)
ALP SERPL-CCNC: 117 U/L (ref 55–135)
ALT SERPL W/O P-5'-P-CCNC: 24 U/L (ref 10–44)
ANION GAP SERPL CALC-SCNC: 11 MMOL/L (ref 8–16)
AST SERPL-CCNC: 25 U/L (ref 10–40)
BASOPHILS # BLD AUTO: 0.03 K/UL (ref 0–0.2)
BASOPHILS NFR BLD: 0.5 % (ref 0–1.9)
BILIRUB SERPL-MCNC: 0.3 MG/DL (ref 0.1–1)
BUN SERPL-MCNC: 14 MG/DL (ref 6–20)
CALCIUM SERPL-MCNC: 9.1 MG/DL (ref 8.7–10.5)
CHLORIDE SERPL-SCNC: 103 MMOL/L (ref 95–110)
CHOLEST SERPL-MCNC: 228 MG/DL (ref 120–199)
CHOLEST/HDLC SERPL: 2.7 {RATIO} (ref 2–5)
CO2 SERPL-SCNC: 27 MMOL/L (ref 23–29)
CREAT SERPL-MCNC: 1.1 MG/DL (ref 0.5–1.4)
DIFFERENTIAL METHOD: NORMAL
EOSINOPHIL # BLD AUTO: 0.1 K/UL (ref 0–0.5)
EOSINOPHIL NFR BLD: 2.4 % (ref 0–8)
ERYTHROCYTE [DISTWIDTH] IN BLOOD BY AUTOMATED COUNT: 13.5 % (ref 11.5–14.5)
EST. GFR  (AFRICAN AMERICAN): >60 ML/MIN/1.73 M^2
EST. GFR  (NON AFRICAN AMERICAN): 56.3 ML/MIN/1.73 M^2
GLUCOSE SERPL-MCNC: 88 MG/DL (ref 70–110)
HCT VFR BLD AUTO: 44.5 % (ref 37–48.5)
HDLC SERPL-MCNC: 86 MG/DL (ref 40–75)
HDLC SERPL: 37.7 % (ref 20–50)
HGB BLD-MCNC: 14.3 G/DL (ref 12–16)
IMM GRANULOCYTES # BLD AUTO: 0.02 K/UL (ref 0–0.04)
IMM GRANULOCYTES NFR BLD AUTO: 0.3 % (ref 0–0.5)
LDLC SERPL CALC-MCNC: 102.6 MG/DL (ref 63–159)
LYMPHOCYTES # BLD AUTO: 1.2 K/UL (ref 1–4.8)
LYMPHOCYTES NFR BLD: 20 % (ref 18–48)
MCH RBC QN AUTO: 29.6 PG (ref 27–31)
MCHC RBC AUTO-ENTMCNC: 32.1 G/DL (ref 32–36)
MCV RBC AUTO: 92 FL (ref 82–98)
MONOCYTES # BLD AUTO: 0.8 K/UL (ref 0.3–1)
MONOCYTES NFR BLD: 13 % (ref 4–15)
NEUTROPHILS # BLD AUTO: 3.7 K/UL (ref 1.8–7.7)
NEUTROPHILS NFR BLD: 63.8 % (ref 38–73)
NONHDLC SERPL-MCNC: 142 MG/DL
NRBC BLD-RTO: 0 /100 WBC
PLATELET # BLD AUTO: 183 K/UL (ref 150–350)
PMV BLD AUTO: 9.6 FL (ref 9.2–12.9)
POTASSIUM SERPL-SCNC: 4.4 MMOL/L (ref 3.5–5.1)
PROT SERPL-MCNC: 7.3 G/DL (ref 6–8.4)
RBC # BLD AUTO: 4.83 M/UL (ref 4–5.4)
SODIUM SERPL-SCNC: 141 MMOL/L (ref 136–145)
TRIGL SERPL-MCNC: 197 MG/DL (ref 30–150)
TSH SERPL DL<=0.005 MIU/L-ACNC: 1.23 UIU/ML (ref 0.4–4)
WBC # BLD AUTO: 5.75 K/UL (ref 3.9–12.7)

## 2020-02-18 PROCEDURE — 83036 HEMOGLOBIN GLYCOSYLATED A1C: CPT

## 2020-02-18 PROCEDURE — 82306 VITAMIN D 25 HYDROXY: CPT

## 2020-02-18 PROCEDURE — 36415 COLL VENOUS BLD VENIPUNCTURE: CPT | Mod: PO

## 2020-02-18 PROCEDURE — 99999 PR PBB SHADOW E&M-EST. PATIENT-LVL III: ICD-10-PCS | Mod: PBBFAC,,, | Performed by: INTERNAL MEDICINE

## 2020-02-18 PROCEDURE — 99999 PR PBB SHADOW E&M-EST. PATIENT-LVL III: CPT | Mod: PBBFAC,,, | Performed by: INTERNAL MEDICINE

## 2020-02-18 PROCEDURE — 85025 COMPLETE CBC W/AUTO DIFF WBC: CPT

## 2020-02-18 PROCEDURE — 80061 LIPID PANEL: CPT

## 2020-02-18 PROCEDURE — 99396 PREV VISIT EST AGE 40-64: CPT | Mod: S$GLB,,, | Performed by: INTERNAL MEDICINE

## 2020-02-18 PROCEDURE — 80053 COMPREHEN METABOLIC PANEL: CPT

## 2020-02-18 PROCEDURE — 84443 ASSAY THYROID STIM HORMONE: CPT

## 2020-02-18 PROCEDURE — 99396 PR PREVENTIVE VISIT,EST,40-64: ICD-10-PCS | Mod: S$GLB,,, | Performed by: INTERNAL MEDICINE

## 2020-02-19 LAB
25(OH)D3+25(OH)D2 SERPL-MCNC: 54 NG/ML (ref 30–96)
ESTIMATED AVG GLUCOSE: 97 MG/DL (ref 68–131)
HBA1C MFR BLD HPLC: 5 % (ref 4–5.6)

## 2020-03-13 ENCOUNTER — HOSPITAL ENCOUNTER (OUTPATIENT)
Dept: RADIOLOGY | Facility: HOSPITAL | Age: 55
Discharge: HOME | End: 2020-03-13
Attending: INTERNAL MEDICINE
Payer: COMMERCIAL

## 2020-03-13 ENCOUNTER — OFFICE VISIT (OUTPATIENT)
Dept: OBSTETRICS AND GYNECOLOGY | Facility: CLINIC | Age: 55
End: 2020-03-13
Payer: COMMERCIAL

## 2020-03-13 VITALS — HEIGHT: 64 IN | BODY MASS INDEX: 38.76 KG/M2 | WEIGHT: 227.06 LBS

## 2020-03-13 VITALS
HEIGHT: 64 IN | DIASTOLIC BLOOD PRESSURE: 68 MMHG | BODY MASS INDEX: 38.84 KG/M2 | SYSTOLIC BLOOD PRESSURE: 122 MMHG | WEIGHT: 227.5 LBS

## 2020-03-13 DIAGNOSIS — Z00.00 ENCOUNTER FOR PREVENTIVE HEALTH EXAMINATION: ICD-10-CM

## 2020-03-13 DIAGNOSIS — M85.80 OSTEOPENIA, UNSPECIFIED LOCATION: ICD-10-CM

## 2020-03-13 DIAGNOSIS — Z01.419 ENCOUNTER FOR GYNECOLOGICAL EXAMINATION WITHOUT ABNORMAL FINDING: Primary | ICD-10-CM

## 2020-03-13 PROCEDURE — 88175 CYTOPATH C/V AUTO FLUID REDO: CPT

## 2020-03-13 PROCEDURE — 77067 SCR MAMMO BI INCL CAD: CPT | Mod: 26,,, | Performed by: RADIOLOGY

## 2020-03-13 PROCEDURE — 87624 HPV HI-RISK TYP POOLED RSLT: CPT

## 2020-03-13 PROCEDURE — 99396 PR PREVENTIVE VISIT,EST,40-64: ICD-10-PCS | Mod: S$GLB,,, | Performed by: OBSTETRICS & GYNECOLOGY

## 2020-03-13 PROCEDURE — 99396 PREV VISIT EST AGE 40-64: CPT | Mod: S$GLB,,, | Performed by: OBSTETRICS & GYNECOLOGY

## 2020-03-13 PROCEDURE — 99999 PR PBB SHADOW E&M-EST. PATIENT-LVL III: ICD-10-PCS | Mod: PBBFAC,,, | Performed by: OBSTETRICS & GYNECOLOGY

## 2020-03-13 PROCEDURE — 77067 SCR MAMMO BI INCL CAD: CPT | Mod: TC

## 2020-03-13 PROCEDURE — 77063 MAMMO DIGITAL SCREENING BILAT WITH TOMOSYNTHESIS_CAD: ICD-10-PCS | Mod: 26,,, | Performed by: RADIOLOGY

## 2020-03-13 PROCEDURE — 99999 PR PBB SHADOW E&M-EST. PATIENT-LVL III: CPT | Mod: PBBFAC,,, | Performed by: OBSTETRICS & GYNECOLOGY

## 2020-03-13 PROCEDURE — 77063 BREAST TOMOSYNTHESIS BI: CPT | Mod: 26,,, | Performed by: RADIOLOGY

## 2020-03-13 PROCEDURE — 77067 MAMMO DIGITAL SCREENING BILAT WITH TOMOSYNTHESIS_CAD: ICD-10-PCS | Mod: 26,,, | Performed by: RADIOLOGY

## 2020-03-13 NOTE — LETTER
March 13, 2020      Alyce Schroeder MD  [address]  [place] [address]           [facility]  [id number] [place] [address]  [place] [address]  Phone: [phone]  Fax: [phone]          Patient: Taryn Serrano   MR Number: [id number]   YOB: 1965   Date of Visit: 3/13/2020       Dear Dr. Alyce Schroeder:    Thank you for referring Taryn Serrano to me for evaluation. Attached you will find relevant portions of my assessment and plan of care.    If you have questions, please do not hesitate to call me. I look forward to following Taryn Serrano along with you.    Sincerely,    Sabine Jiang MD    Enclosure  CC:  No Recipients    If you would like to receive this communication electronically, please contact [email or web address] or [phone] to request more information on [employer] Link access.    For providers and/or their staff who would like to refer a patient to [facility], please contact us through our one-stop-shop provider referral line, [facility], at [phone].    If you feel you have received this communication in error or would no longer like to receive these types of communications, please e-mail [email or web address]

## 2020-03-13 NOTE — PROGRESS NOTES
HPI:  54 y.o. female patient  presents today for annual exam.  No complaints.   and not on HRT.  Dexa scan in 2016 showed osteopenia.  Due for 5 year follow up in .      Past Medical History:   Diagnosis Date    Anxiety and depression     Asthma, exercise induced     Dry eyes     History of ADHD     Hypothyroid     URBAN (obstructive sleep apnea)     Vitamin D deficiency        Past Surgical History:   Procedure Laterality Date    ANKLE SURGERY Right     CARPAL TUNNEL RELEASE Bilateral     CATARACT EXTRACTION BILATERAL W/ ANTERIOR VITRECTOMY      CATARACT EXTRACTION W/  INTRAOCULAR LENS IMPLANT  OU    KNEE SURGERY      right       REVIEW OF SYSTEMS:  GENERAL:  No fever, chills, fatigue, or weight loss  ABDOMEN:  Normal appetite, no weight loss or abdominal pain  URINARY:  No flank pain, dysuria, or hematuria  REPRODUCTIVE:  No abnormal vaginal bleeding  BREASTS:  No tenderness, masses, or nipple discharge noted of breasts    PHYSICAL EXAM:    APPEARANCE:  Well nourished, well developed, in no acute distress  NECK:  Symmetric without masses or thyromegaly  BREASTS:  Symmetrical, no skin changes or visible lesions.  No palpable masses, nipple discharge, or adenopathy bilaterally.  ABDOMEN:  Soft, no tenderness or masses, no distension noted  PELVIC:  VULVA:  No lesions.  Normal female genitalia  URETHRAL MEATUS:  Normal size and location.  No lesions.  No prolapse  URETHRA:  No masses, tenderness, prolapse, or scarring  VAGINA:  No lesions or discharge.  No significant cystocele or rectocele  CERVIX:  No lesions.  Normal diameter, no cervical motion tenderness.  UTERUS:  4-6 week size, regular shape, mobile, non-tender, normal position, good support  ADNEXA:  No masses or tenderness  ANUS AND PERINEUM:  No lesions.  No external hemorrhoids    1. Encounter for gynecological examination without abnormal finding  Liquid-Based Pap Smear, Screening    HPV High Risk Genotypes, PCR   2. Encounter for  preventive health examination  Ambulatory referral/consult to Gynecology         PLAN:  MMG ordered and scheduled.  Patient counseled regarding recommended routine health maintenance for her age.

## 2020-03-19 ENCOUNTER — PATIENT MESSAGE (OUTPATIENT)
Dept: FAMILY MEDICINE | Facility: CLINIC | Age: 55
End: 2020-03-19

## 2020-03-19 DIAGNOSIS — J45.990 ASTHMA, EXERCISE INDUCED: ICD-10-CM

## 2020-03-19 RX ORDER — ALBUTEROL SULFATE 90 UG/1
1-2 AEROSOL, METERED RESPIRATORY (INHALATION) EVERY 6 HOURS PRN
Qty: 18 G | Refills: 6 | Status: SHIPPED | OUTPATIENT
Start: 2020-03-19 | End: 2023-09-18 | Stop reason: SDUPTHER

## 2020-03-20 ENCOUNTER — PATIENT MESSAGE (OUTPATIENT)
Dept: SLEEP MEDICINE | Facility: CLINIC | Age: 55
End: 2020-03-20

## 2020-03-20 LAB
HPV HR 12 DNA SPEC QL NAA+PROBE: NEGATIVE
HPV16 AG SPEC QL: NEGATIVE
HPV18 DNA SPEC QL NAA+PROBE: NEGATIVE

## 2020-04-04 LAB
FINAL PATHOLOGIC DIAGNOSIS: NORMAL
Lab: NORMAL

## 2020-04-22 DIAGNOSIS — F41.9 ANXIETY AND DEPRESSION: ICD-10-CM

## 2020-04-22 DIAGNOSIS — F32.A ANXIETY AND DEPRESSION: ICD-10-CM

## 2020-04-22 RX ORDER — BUPROPION HYDROCHLORIDE 300 MG/1
TABLET ORAL
Qty: 90 TABLET | Refills: 3 | Status: SHIPPED | OUTPATIENT
Start: 2020-04-22 | End: 2021-04-18 | Stop reason: SDUPTHER

## 2020-04-26 ENCOUNTER — PATIENT MESSAGE (OUTPATIENT)
Dept: FAMILY MEDICINE | Facility: CLINIC | Age: 55
End: 2020-04-26

## 2020-07-19 ENCOUNTER — LAB VISIT (OUTPATIENT)
Dept: PRIMARY CARE CLINIC | Facility: CLINIC | Age: 55
End: 2020-07-19
Payer: COMMERCIAL

## 2020-07-19 DIAGNOSIS — Z00.6 RESEARCH SUBJECT: ICD-10-CM

## 2020-07-19 LAB — SARS-COV-2 IGG SERPLBLD QL IA.RAPID: NEGATIVE

## 2020-07-19 PROCEDURE — U0003 INFECTIOUS AGENT DETECTION BY NUCLEIC ACID (DNA OR RNA); SEVERE ACUTE RESPIRATORY SYNDROME CORONAVIRUS 2 (SARS-COV-2) (CORONAVIRUS DISEASE [COVID-19]), AMPLIFIED PROBE TECHNIQUE, MAKING USE OF HIGH THROUGHPUT TECHNOLOGIES AS DESCRIBED BY CMS-2020-01-R: HCPCS

## 2020-07-19 PROCEDURE — 86769 SARS-COV-2 COVID-19 ANTIBODY: CPT

## 2020-07-19 NOTE — RESEARCH
Date of Consent: 7/19/2020    Sponsor: [facility]    Study Title/IRB Number: Observational study of Sars-CoV2 Immunoglobulin G (IgG) seroprevalence among the [facility] population over time 2020.163  Principle Investigator: Georgette Nguyen, PhD    Did the patient need translation services? No   name: N/A    Prior to the Informed Consent (IC) being signed, or any study protocol required data collection, testing, procedure, or intervention being performed, the following was done and/or discussed:   Patient was given a paper copy of the IC for review    Patient was given study FAQ   Purpose of the study and qualifications to participate    Study design and tests or procedures done at this visit   Confidentiality and HIPAA Authorization for Release of Medical Records for the research trial/ subject's rights/research related injury   Risk, Benefits, Alternative Treatments, Compensation and Costs   Participation in the research trial is voluntary and patient may withdraw at anytime   Contact information for study related questions    Patient verbalizes understanding of the above: Yes  Contact information for PI and IRB given to patient: Yes  Patient able to adequately summarize: the purpose of the study, the risks associated with the study, and all procedures, testing, and follow-ups associated with the study: Yes    The consent was discussed verbally with the patient and all questions were answered satisfactorily. Patient gave verbal consent for the Seroprevalence research study with an IRB approval date of 05/08/2020.      The Consent, Consent Witness and name of Clinical Research Coordinator consenting was captured and documented in REDCap.    All Inclusion and Exclusion Criteria reviewed, subject meets all Inclusion criteria and does not meet any Exclusion Criteria at this time.     Patient Eligibility was confirmed.    Patient responded to survey questions.    The following biospecimen  collection procedures were collected:    -Nasopharyngeal Swab Collection  -Blood collection

## 2020-07-22 LAB — SARS-COV-2 RNA RESP QL NAA+PROBE: NOT DETECTED

## 2020-07-27 ENCOUNTER — OFFICE VISIT (OUTPATIENT)
Dept: PULMONOLOGY | Facility: CLINIC | Age: 55
End: 2020-07-27
Payer: COMMERCIAL

## 2020-07-27 VITALS
BODY MASS INDEX: 38.12 KG/M2 | RESPIRATION RATE: 18 BRPM | DIASTOLIC BLOOD PRESSURE: 68 MMHG | HEIGHT: 65 IN | SYSTOLIC BLOOD PRESSURE: 112 MMHG | OXYGEN SATURATION: 98 % | WEIGHT: 228.81 LBS | HEART RATE: 80 BPM

## 2020-07-27 DIAGNOSIS — E66.9 OBESITY, CLASS II, BMI 35-39.9: ICD-10-CM

## 2020-07-27 DIAGNOSIS — G47.33 OSA (OBSTRUCTIVE SLEEP APNEA): Primary | ICD-10-CM

## 2020-07-27 DIAGNOSIS — J45.990 ASTHMA, EXERCISE INDUCED: ICD-10-CM

## 2020-07-27 PROCEDURE — 99214 OFFICE O/P EST MOD 30 MIN: CPT | Mod: S$GLB,,, | Performed by: NURSE PRACTITIONER

## 2020-07-27 PROCEDURE — 99999 PR PBB SHADOW E&M-EST. PATIENT-LVL V: ICD-10-PCS | Mod: PBBFAC,,, | Performed by: NURSE PRACTITIONER

## 2020-07-27 PROCEDURE — 3008F PR BODY MASS INDEX (BMI) DOCUMENTED: ICD-10-PCS | Mod: CPTII,S$GLB,, | Performed by: NURSE PRACTITIONER

## 2020-07-27 PROCEDURE — 3008F BODY MASS INDEX DOCD: CPT | Mod: CPTII,S$GLB,, | Performed by: NURSE PRACTITIONER

## 2020-07-27 PROCEDURE — 99214 PR OFFICE/OUTPT VISIT, EST, LEVL IV, 30-39 MIN: ICD-10-PCS | Mod: S$GLB,,, | Performed by: NURSE PRACTITIONER

## 2020-07-27 PROCEDURE — 99999 PR PBB SHADOW E&M-EST. PATIENT-LVL V: CPT | Mod: PBBFAC,,, | Performed by: NURSE PRACTITIONER

## 2020-07-27 NOTE — PROGRESS NOTES
"Subjective:      Patient ID: Taryn Serrano is a 55 y.o. female.    Chief Complaint: Sleep Apnea (dl in media)    Asthma  Her past medical history is significant for asthma.     Presents to office for review of AutoPAP therapy. Patient states improved symptoms with use of AutoPAP. Sleeping more soundly. Waking up feeling more refreshed. Improved daytime sleepiness. Patient states she is benefiting from use of the AutoPAP. Her current machine is over 5 years old, end of life, making louder noise . She is requesting replacement.   No fever, chills, or hemoptysis. No pleuritic type chest pain. Breathing is stable as compared to 6 months ago.    Patient Active Problem List   Diagnosis    Circadian rhythm sleep disorder, irregular sleep-wake type    Paving stone degeneration of peripheral retina    Periodic limb movement disorder    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    Asthma, exercise induced    URBAN (obstructive sleep apnea)    Acquired hypothyroidism    Anxiety and depression    Vitamin D deficiency    Pseudophakia of both eyes    Dry eye    PCO (posterior capsular opacification)    Obesity, Class II, BMI 35-39.9, no comorbidity    Synovial cyst of left popliteal space    Left ACL tear    Tear of medial meniscus of left knee    Other hyperlipidemia    CKD (chronic kidney disease) stage 3, GFR 30-59 ml/min    Osteopenia         /68   Pulse 80   Resp 18   Ht 5' 5" (1.651 m)   Wt 103.8 kg (228 lb 13.4 oz)   LMP 01/07/2015   SpO2 98%   BMI 38.08 kg/m²   Body mass index is 38.08 kg/m².    Review of Systems   Constitutional: Negative.    HENT: Negative.    Respiratory: Negative.    Cardiovascular: Negative.    Musculoskeletal: Negative.    Gastrointestinal: Negative.    Neurological: Negative.    Psychiatric/Behavioral: Negative.      Objective:      Physical Exam  Constitutional:       Appearance: She is well-developed.   HENT:      Head: Normocephalic and " atraumatic.      Mouth/Throat:      Comments: Wearing mask due to covid concerns  Neck:      Musculoskeletal: Normal range of motion and neck supple.   Cardiovascular:      Rate and Rhythm: Normal rate and regular rhythm.      Heart sounds: No murmur. No gallop.    Pulmonary:      Effort: Pulmonary effort is normal.      Breath sounds: Normal breath sounds.   Abdominal:      Palpations: Abdomen is soft. There is no mass.      Tenderness: There is no abdominal tenderness.   Musculoskeletal: Normal range of motion.   Skin:     General: Skin is warm and dry.   Neurological:      Mental Status: She is alert and oriented to person, place, and time.   Psychiatric:         Mood and Affect: Mood normal.         Behavior: Behavior normal.     Answers for HPI/ROS submitted by the patient on 7/27/2020   Asthma  In the past 4 weeks, how much of the time did your asthma keep you from getting as much done at work, school, or at home?: none of the time  During the past 4 weeks, how often have you had shortness of breath?: not at all  During the past 4 weeks, how often did your asthma symptoms (Wheezing, coughing, shortness of breath, chest tightness or pain) wake you up at night or earlier that usual in the morning?: not at all  During the past 4 weeks, how often have you used your rescue inhaler or nebulizer medication (such as albuterol)?: 1 or 2 times per day  How would you rate your asthma control during the past 4 weeks?: completely controlled   : 22    Personal Diagnostic Review  Autopap download: Patient wears on average 7 hrs and 45 minutes. Greater than 4 hrs 100 % of the time. 90% percentile pressure 11 with AHI 4 events/hr      Assessment:       1. URBAN (obstructive sleep apnea)    2. Obesity, Class II, BMI 35-39.9    3. Asthma, exercise induced        Outpatient Encounter Medications as of 7/27/2020   Medication Sig Dispense Refill    albuterol (VENTOLIN HFA) 90 mcg/actuation inhaler Inhale 1-2 puffs into the lungs  every 6 (six) hours as needed for Wheezing. RescueINHALE 1 TO 2 PUFFS BY MOUTH EVERY 6 HOURS AS NEEDED FOR WHEEZE 18 g 6    buPROPion (WELLBUTRIN XL) 300 MG 24 hr tablet TAKE 1 TABLET BY MOUTH EVERY DAY 90 tablet 3    cholecalciferol, vitamin D3, 2,000 unit Cap Take 1 capsule (2,000 Units total) by mouth once daily. (Patient taking differently: Take 1 capsule by mouth every other day. ) 100 capsule 6    ciprofloxacin-dexamethasone 0.3-0.1% (CIPRODEX) 0.3-0.1 % DrpS Place 4 drops into the left ear 2 (two) times daily. 7.5 mL 0    esomeprazole (NEXIUM) 40 MG capsule TAKE 1 CAPSULE (40 MG TOTAL) BY MOUTH BEFORE BREAKFAST. DAILY PRN 30 capsule 6    FLUoxetine 40 MG capsule TAKE 2 CAPSULES (80 MG TOTAL) BY MOUTH ONCE DAILY. 180 capsule 2    fluticasone (FLONASE) 50 mcg/actuation nasal spray 2 sprays (100 mcg total) by Each Nare route once daily. 1 Bottle 3    fluticasone-salmeterol 500-50 mcg/dose (ADVAIR DISKUS) 500-50 mcg/dose DsDv diskus inhaler Inhale 1 puff into the lungs 2 (two) times daily. (Patient taking differently: Inhale 1 puff into the lungs 2 (two) times daily as needed. ) 60 each 6    levothyroxine (SYNTHROID) 112 MCG tablet TAKE 1 TABLET (112 MCG TOTAL) BY MOUTH ONCE DAILY. 90 tablet 3    meloxicam (MOBIC) 15 MG tablet TAKE ONE PILL DAILY AS NEEDED FOR PAIN 30 tablet 1    turmeric root extract 500 mg Cap Take 1,000 capsules by mouth once daily. 100 capsule 6    metronidazole 0.75% (METROCREAM) 0.75 % Crea Apply topically 2 (two) times daily. 45 g 2     Facility-Administered Encounter Medications as of 7/27/2020   Medication Dose Route Frequency Provider Last Rate Last Dose    ondansetron HCl (PF) 4 mg/2 mL injection    PRN Laci Smalls CRNA   4 mg at 07/07/15 0846     Orders Placed This Encounter   Procedures    CPAP FOR HOME USE     Replacement     Order Specific Question:   Type:     Answer:   Auto CPAP     Order Specific Question:   Auto CPAP pressure setting range (cmH20):     Answer:    6-12     Order Specific Question:   Length of need (1-99 months):     Answer:   99     Order Specific Question:   Humidification:     Answer:   Heated     Order Specific Question:   Type of mask:     Answer:   Nasal     Order Specific Question:   Headgear?     Answer:   Yes     Order Specific Question:   Tubing?     Answer:   Yes     Order Specific Question:   Humidifier chamber?     Answer:   Yes     Order Specific Question:   Chin strap?     Answer:   Yes     Order Specific Question:   Filters?     Answer:   Yes     Order Specific Question:   Cushions?     Answer:   Yes     Plan:     CPAP replacement order. Follow up 2 months  Problem List Items Addressed This Visit        Pulmonary    Asthma, exercise induced       Other    URBAN (obstructive sleep apnea) - Primary    Relevant Orders    CPAP FOR HOME USE      Other Visit Diagnoses     Obesity, Class II, BMI 35-39.9          asthma controlled.   Weight loss and exercise to improve overall health.

## 2020-08-12 NOTE — PROGRESS NOTES
"Subjective:      Patient ID: Taryn Serrano is a 55 y.o. female.    Chief Complaint: Follow-up      HPI  Here for f/u medical problems.  Living with new partner.  Continues to work.  Energy good.  Doing well on cpap.  Prn albuterol working well, but needing almost daily.  Not taking advair.  No f/c/sw/cough.  No cp/palp.  Anxiety doing well, feels happy.  BMs normal.    Updated/ annual due 2/21:  HM: 12/19 fluvax, 3/19 HAV, 1/15 [id number], 10/13 [id number] booster, 3/19 TDaP, 3/20 MMG/ Gyn Dr. Jiang, 1/16 BMD low fx risk rep 5y, 7/12 Cscope rep 10y, 2/17 HCV neg.     Review of Systems   Constitutional: Negative for appetite change, chills, diaphoresis and fever.   HENT: Negative for congestion, ear pain, rhinorrhea, sinus pressure and sore throat.    Respiratory: Negative for cough, chest tightness and shortness of breath.    Cardiovascular: Negative for chest pain and palpitations.   Gastrointestinal: Negative for blood in stool, constipation, diarrhea, nausea and vomiting.   Genitourinary: Negative for dysuria, frequency, hematuria, menstrual problem, urgency and vaginal discharge.   Musculoskeletal: Negative for arthralgias.   Skin: Negative for rash.   Neurological: Negative for dizziness and headaches.   Psychiatric/Behavioral: Negative for sleep disturbance. The patient is not nervous/anxious.          Objective:   /68   Pulse 80   Temp 98.8 °F (37.1 °C) (Temporal)   Ht 5' 5" (1.651 m)   Wt 104.6 kg (230 lb 9.6 oz)   LMP 01/07/2015   SpO2 97%   BMI 38.37 kg/m²     Physical Exam  Constitutional:       Appearance: She is well-developed.   HENT:      Right Ear: External ear normal. Tympanic membrane is not injected.      Left Ear: External ear normal. Tympanic membrane is not injected.   Eyes:      Conjunctiva/sclera: Conjunctivae normal.   Neck:      Musculoskeletal: Normal range of motion and neck supple.      Thyroid: No thyromegaly.   Cardiovascular:      Rate and Rhythm: Normal rate and " regular rhythm.      Heart sounds: No murmur. No friction rub. No gallop.    Pulmonary:      Effort: Pulmonary effort is normal.      Breath sounds: Normal breath sounds. No wheezing or rales.   Abdominal:      General: Bowel sounds are normal.      Palpations: Abdomen is soft. There is no mass.      Tenderness: There is no abdominal tenderness.   Lymphadenopathy:      Cervical: No cervical adenopathy.   Skin:     General: Skin is warm.      Findings: No rash.   Neurological:      Mental Status: She is alert and oriented to person, place, and time.             Assessment:       1. Acquired hypothyroidism    2. Vitamin D deficiency    3. Other hyperlipidemia    4. URBAN (obstructive sleep apnea)    5. Anxiety and depression    6. Asthma, exercise induced    7. Preventive measure    8. Exercise-induced asthma          Plan:     Acquired hypothyroidism- Clinically stable, continue present treatment.  -     TSH; Future; Expected date: 08/18/2020    Vitamin D deficiency  -     Vitamin D; Future    Other hyperlipidemia- recheck 6mo, exercise as tolerated.    URBAN (obstructive sleep apnea)- doing well, cont cpap.    Anxiety and depression- stable, cont rxs.    Asthma, exercise induced- restart advair, use albuterol prn.  Call if not better 1mo.  -     fluticasone-salmeterol diskus inhaler 500-50 mcg; Inhale 1 puff into the lungs 2 (two) times daily.  Dispense: 60 each; Refill: 6    Preventive measure- due in 6mo.  -     CBC auto differential; Future; Expected date: 08/18/2020  -     Comprehensive metabolic panel; Future; Expected date: 08/18/2020  -     Lipid Panel; Future; Expected date: 08/18/2020  -     TSH; Future; Expected date: 08/18/2020  -     Vitamin D; Future  -     Hemoglobin A1C; Future; Expected date: 08/18/2020

## 2020-08-18 ENCOUNTER — OFFICE VISIT (OUTPATIENT)
Dept: FAMILY MEDICINE | Facility: CLINIC | Age: 55
End: 2020-08-18
Payer: COMMERCIAL

## 2020-08-18 VITALS
WEIGHT: 230.63 LBS | TEMPERATURE: 99 F | SYSTOLIC BLOOD PRESSURE: 104 MMHG | HEIGHT: 65 IN | HEART RATE: 80 BPM | OXYGEN SATURATION: 97 % | DIASTOLIC BLOOD PRESSURE: 68 MMHG | BODY MASS INDEX: 38.42 KG/M2

## 2020-08-18 DIAGNOSIS — E55.9 VITAMIN D DEFICIENCY: ICD-10-CM

## 2020-08-18 DIAGNOSIS — J45.990 ASTHMA, EXERCISE INDUCED: ICD-10-CM

## 2020-08-18 DIAGNOSIS — J45.990 EXERCISE-INDUCED ASTHMA: ICD-10-CM

## 2020-08-18 DIAGNOSIS — Z29.9 PREVENTIVE MEASURE: ICD-10-CM

## 2020-08-18 DIAGNOSIS — E03.9 ACQUIRED HYPOTHYROIDISM: Primary | ICD-10-CM

## 2020-08-18 DIAGNOSIS — F32.A ANXIETY AND DEPRESSION: ICD-10-CM

## 2020-08-18 DIAGNOSIS — F41.9 ANXIETY AND DEPRESSION: ICD-10-CM

## 2020-08-18 DIAGNOSIS — G47.33 OSA (OBSTRUCTIVE SLEEP APNEA): ICD-10-CM

## 2020-08-18 DIAGNOSIS — E78.49 OTHER HYPERLIPIDEMIA: ICD-10-CM

## 2020-08-18 PROCEDURE — 99999 PR PBB SHADOW E&M-EST. PATIENT-LVL III: ICD-10-PCS | Mod: PBBFAC,,, | Performed by: INTERNAL MEDICINE

## 2020-08-18 PROCEDURE — 3008F PR BODY MASS INDEX (BMI) DOCUMENTED: ICD-10-PCS | Mod: CPTII,S$GLB,, | Performed by: INTERNAL MEDICINE

## 2020-08-18 PROCEDURE — 3008F BODY MASS INDEX DOCD: CPT | Mod: CPTII,S$GLB,, | Performed by: INTERNAL MEDICINE

## 2020-08-18 PROCEDURE — 99214 PR OFFICE/OUTPT VISIT, EST, LEVL IV, 30-39 MIN: ICD-10-PCS | Mod: S$GLB,,, | Performed by: INTERNAL MEDICINE

## 2020-08-18 PROCEDURE — 99214 OFFICE O/P EST MOD 30 MIN: CPT | Mod: S$GLB,,, | Performed by: INTERNAL MEDICINE

## 2020-08-18 PROCEDURE — 99999 PR PBB SHADOW E&M-EST. PATIENT-LVL III: CPT | Mod: PBBFAC,,, | Performed by: INTERNAL MEDICINE

## 2020-08-18 RX ORDER — FLUTICASONE PROPIONATE AND SALMETEROL 500; 50 UG/1; UG/1
1 POWDER RESPIRATORY (INHALATION) 2 TIMES DAILY
Qty: 60 EACH | Refills: 6 | Status: SHIPPED | OUTPATIENT
Start: 2020-08-18 | End: 2021-06-10

## 2020-08-24 ENCOUNTER — OFFICE VISIT (OUTPATIENT)
Dept: PODIATRY | Facility: CLINIC | Age: 55
End: 2020-08-24
Payer: COMMERCIAL

## 2020-08-24 ENCOUNTER — HOSPITAL ENCOUNTER (OUTPATIENT)
Dept: RADIOLOGY | Facility: HOSPITAL | Age: 55
Discharge: HOME | End: 2020-08-24
Attending: PODIATRIST
Payer: COMMERCIAL

## 2020-08-24 VITALS
WEIGHT: 229.19 LBS | SYSTOLIC BLOOD PRESSURE: 111 MMHG | HEART RATE: 73 BPM | DIASTOLIC BLOOD PRESSURE: 74 MMHG | BODY MASS INDEX: 38.14 KG/M2

## 2020-08-24 DIAGNOSIS — M77.42 METATARSALGIA OF BOTH FEET: ICD-10-CM

## 2020-08-24 DIAGNOSIS — M77.41 METATARSALGIA OF BOTH FEET: ICD-10-CM

## 2020-08-24 DIAGNOSIS — E66.01 SEVERE OBESITY (BMI 35.0-39.9) WITH COMORBIDITY: ICD-10-CM

## 2020-08-24 DIAGNOSIS — M20.12 HALLUX VALGUS (ACQUIRED), LEFT FOOT: ICD-10-CM

## 2020-08-24 DIAGNOSIS — M20.41 HAMMER TOES OF BOTH FEET: ICD-10-CM

## 2020-08-24 DIAGNOSIS — M24.572 CONTRACTURE, LEFT ANKLE: ICD-10-CM

## 2020-08-24 DIAGNOSIS — M21.622 TAILOR'S BUNIONETTE, BILATERAL: ICD-10-CM

## 2020-08-24 DIAGNOSIS — M20.42 HAMMER TOES OF BOTH FEET: ICD-10-CM

## 2020-08-24 DIAGNOSIS — M20.11 HALLUX VALGUS (ACQUIRED), RIGHT FOOT: Primary | ICD-10-CM

## 2020-08-24 DIAGNOSIS — M21.621 TAILOR'S BUNIONETTE, BILATERAL: ICD-10-CM

## 2020-08-24 DIAGNOSIS — M24.571 CONTRACTURE, RIGHT ANKLE: ICD-10-CM

## 2020-08-24 PROCEDURE — 99214 PR OFFICE/OUTPT VISIT, EST, LEVL IV, 30-39 MIN: ICD-10-PCS | Mod: S$GLB,,, | Performed by: PODIATRIST

## 2020-08-24 PROCEDURE — 99999 PR PBB SHADOW E&M-EST. PATIENT-LVL III: CPT | Mod: PBBFAC,,, | Performed by: PODIATRIST

## 2020-08-24 PROCEDURE — 3008F BODY MASS INDEX DOCD: CPT | Mod: CPTII,S$GLB,, | Performed by: PODIATRIST

## 2020-08-24 PROCEDURE — 99214 OFFICE O/P EST MOD 30 MIN: CPT | Mod: S$GLB,,, | Performed by: PODIATRIST

## 2020-08-24 PROCEDURE — 99999 PR PBB SHADOW E&M-EST. PATIENT-LVL III: ICD-10-PCS | Mod: PBBFAC,,, | Performed by: PODIATRIST

## 2020-08-24 PROCEDURE — 73630 X-RAY EXAM OF FOOT: CPT | Mod: 26,,, | Performed by: RADIOLOGY

## 2020-08-24 PROCEDURE — 3008F PR BODY MASS INDEX (BMI) DOCUMENTED: ICD-10-PCS | Mod: CPTII,S$GLB,, | Performed by: PODIATRIST

## 2020-08-24 PROCEDURE — 73630 X-RAY EXAM OF FOOT: CPT | Mod: TC,50

## 2020-08-24 PROCEDURE — 73630 XR FOOT COMPLETE 3 VIEW BILATERAL: ICD-10-PCS | Mod: 26,,, | Performed by: RADIOLOGY

## 2020-08-24 NOTE — PROGRESS NOTES
Subjective:       Patient ID: Taryn Serrano is a 55 y.o. female.    Chief Complaint: Foot Pain (l. foot, 0/10 pain, non diabetic, pcp marv Johnston)    HPI: Taryn Serrano presents to the office today, for surgical consultation regarding left and right foot pains.  I did see this patient approximately 1 year ago for the aforementioned.  Patient states her left foot is the most problematic at this time, mostly dorsally.  States neuritis.  Does take Mobic or Tylenol or Motrin as needed.  Does not relate a history of lower back pathology.  She is not diabetic, but is obese.  Patient denies recent trauma.  States 0/10 pains at this time.  On a consistent basis however, with prolonged walking standing, her pains are approximately 5/10.    Review of patient's allergies indicates:   Allergen Reactions    Grass pollen-perennial rye, standard Shortness Of Breath    Horsetail (equisetum arvense) Shortness Of Breath    Mold extracts Shortness Of Breath       Past Medical History:   Diagnosis Date    Anxiety and depression     Asthma, exercise induced     Dry eyes     History of ADHD     Hypothyroid     URBAN (obstructive sleep apnea)     Vitamin D deficiency        Family History   Problem Relation Age of Onset    Cataracts Father     Alzheimer's disease Father     Colon cancer Father     Heart failure Mother     Melanoma Neg Hx     Psoriasis Neg Hx     Lupus Neg Hx     Eczema Neg Hx     Acne Neg Hx     Breast cancer Neg Hx     Ovarian cancer Neg Hx     Thrombophilia Neg Hx        Social History     Socioeconomic History    Marital status:      Spouse name: Not on file    Number of children: 2    Years of education: Not on file    Highest education level: Not on file   Occupational History     Employer: [employer]   Social Needs    Financial resource strain: Not on file    Food insecurity     Worry: Not on file     Inability: Not on file    Transportation needs      Medical: Not on file     Non-medical: Not on file   Tobacco Use    Smoking status: Never Smoker    Smokeless tobacco: Never Used   Substance and Sexual Activity    Alcohol use: Yes     Comment: social    Drug use: No    Sexual activity: Yes   Lifestyle    Physical activity     Days per week: Not on file     Minutes per session: Not on file    Stress: Not at all   Relationships    Social connections     Talks on phone: Not on file     Gets together: Not on file     Attends Gnosticist service: Not on file     Active member of club or organization: Not on file     Attends meetings of clubs or organizations: Not on file     Relationship status: Not on file   Other Topics Concern    Are you pregnant or think you may be? No    Breast-feeding No   Social History Narrative    Full time emplyed.       Past Surgical History:   Procedure Laterality Date    ANKLE SURGERY Right     BREAST BIOPSY Right 1990's    benign    CARPAL TUNNEL RELEASE Bilateral     CATARACT EXTRACTION BILATERAL W/ ANTERIOR VITRECTOMY      CATARACT EXTRACTION W/  INTRAOCULAR LENS IMPLANT  OU    KNEE SURGERY      right       Review of Systems   Constitutional: Negative for chills, fatigue and fever.   HENT: Negative for hearing loss.    Eyes: Negative for photophobia and visual disturbance.   Respiratory: Negative for cough, chest tightness, shortness of breath and wheezing.    Cardiovascular: Negative for chest pain and palpitations.   Gastrointestinal: Negative for constipation, diarrhea, nausea and vomiting.   Endocrine: Negative for cold intolerance and heat intolerance.   Genitourinary: Negative for flank pain.   Musculoskeletal: Positive for arthralgias and gait problem. Negative for neck pain and neck stiffness.   Skin: Negative for wound.   Neurological: Negative for light-headedness and headaches.        Neuritis left foot   Psychiatric/Behavioral: Negative for sleep disturbance.          Objective:   /74   Pulse 73   Wt  103.9 kg (229 lb 2.7 oz)   LMP 01/07/2015   BMI 38.14 kg/m²          Physical Exam  LOWER EXTREMITY PHYSICAL EXAMINATION  NEUROLOGY: Sensation to light touch is intact. Proprioception is intact. Sensation to pin prick is intact.    DERMATOLOGY: Skin is supple, dry and intact. No ecchymosis is noted. No hypertrophic skin formation. No erythema or cellulitis is noted.    VASCULAR: Pulses are palpable to the B/L lower extremity. The right dorsalis pedis pulse is 2/4 and the posterior tibial pulse is 2/4. The left dorsalis pedis pulse is 2/4 and the posterior tibial pulse is 2/4. Hair growth is noted on the dorsal foot and digits. Proximal to distal, warm to warm. Capillary refill time is WNL at less than 3s.    ORTHOPEDIC: Manual Muscle Testing is 5/5 in all planes on the left and right, without pains, with and without resistance. No pains to palpation of the medial or lateral ankle ligaments.  High arch, cavus foot type, bilateral.  Moderate HAV is noted bilateral.  Semi-rigid hammertoe contractures, bilateral.  Gastrocsoleus equinus contracture, bilateral.  Antalgic gait pattern.  Tailor's bunion is noted, bilateral.  Dorsal midfoot spurring, left.    Assessment:     1. Hallux valgus (acquired), right foot    2. Hallux valgus (acquired), left foot    3. Hammer toes of both feet    4. Contracture, right ankle    5. Contracture, left ankle    6. Severe obesity (BMI 35.0-39.9) with comorbidity    7. Tailor's bunionette, bilateral    8. Metatarsalgia of both feet        Plan:     Hallux valgus (acquired), right foot  Hallux valgus (acquired), left foot  Hammer toes of both feet  Contracture, right ankle  Contracture, left ankle  Tailor's bunionette, bilateral  Metatarsalgia of both feet  -     X-Ray Foot Complete Bilateral; Future; Expected date: 08/24/2020    Thorough discussion is had with the patient today, concerning the diagnosis, its etiology, and the treatment algorithm at present.  Prior XRAYS are reviewed in  detail with the patient. All questions and concerns regarding findings and its/their implications are outlined and discussed.    For preoperative purposes, please repeat/update radiographs.  Motrin 800mg BID prn.     Did discuss proper and supportive shoe gear in detail and at length with the patient.  These are shoes with firm and robust arch support; medial counter.  Shoes which only bend at the metatarsophalangeal joint and which are rigid in the midfoot and hindfoot. Patient urged to purchase running type or cross training type shoes gear which are designed for pronation control.    The procedure of (dorsal midfoot ostectomy, with 1st metatarsophalangeal joint fusion, with repair of hammertoe contractures via fusion and metatarsal osteotomy, with posterior heel cord lengthening) was thoroughly explained to the patient. Its necessity and/or purpose and the implications therein were outlined, including any pertinent advantages and/or disadvantages, and possible complications, if any. Possible complications include recurrence of pathology and/or deformity, infection (cellulitis, drainage, purulence, malodor, etc...), pain, numbness, neuritis, edema, burning, loss of function, need for further surgery, possible need for removal of any implanted hardware, soft tissue contracture and/or scarring, etc... No guarantees were given and/or implied. Post-operative expectations and weightbearing protocol is thoroughly explained the patient, who acknowledges understanding.    Severe obesity (BMI 35.0-39.9) with comorbidity  Patient is counseled and reminded concerning the importance of good nutrition and healthy eating habits, which may include blood sugar control, to prevent and/or help podiatric foot and ankle complications.                Future Appointments   Date Time Provider Department Center   9/29/2020  8:00 AM Elizabeth Lejeune, NP [facility]   2/11/2021  8:10 AM NAUN [facility]  [address]   2/18/2021  9:20 AM Alyce Schroeder MD [facility] [address]

## 2020-08-25 DIAGNOSIS — Z01.818 PREOP TESTING: Primary | ICD-10-CM

## 2020-08-25 DIAGNOSIS — M20.12 HALLUX VALGUS (ACQUIRED), LEFT FOOT: Primary | ICD-10-CM

## 2020-08-25 DIAGNOSIS — E55.9 VITAMIN D DEFICIENCY: ICD-10-CM

## 2020-08-25 DIAGNOSIS — M77.42 METATARSALGIA OF BOTH FEET: ICD-10-CM

## 2020-08-25 DIAGNOSIS — M20.42 HAMMER TOES OF BOTH FEET: ICD-10-CM

## 2020-08-25 DIAGNOSIS — M77.41 METATARSALGIA OF BOTH FEET: ICD-10-CM

## 2020-08-25 DIAGNOSIS — M24.572 CONTRACTURE, LEFT ANKLE: ICD-10-CM

## 2020-08-25 DIAGNOSIS — M20.41 HAMMER TOES OF BOTH FEET: ICD-10-CM

## 2020-08-26 NOTE — PROGRESS NOTES
"Subjective:      Patient ID: Taryn Serrano is a 55 y.o. female.    Chief Complaint: Pre-op Exam      HPI  Patient is here for follow up of medical problems and preoperative evaluation for left foot surgery, Dr. Simmons, on 9/18/20.  No history of problems with anesthesia.  No bleeding diathesis.  No exertional cp/sob/palp.  No significant renal or hepatic disease.      Past Medical History:   Diagnosis Date    Anxiety and depression     Asthma, exercise induced     Dry eyes     History of ADHD     Hypothyroid     URBAN (obstructive sleep apnea)     Vitamin D deficiency        Past Surgical History:   Procedure Laterality Date    ANKLE SURGERY Right     BREAST BIOPSY Right 1990's    benign    CARPAL TUNNEL RELEASE Bilateral     CATARACT EXTRACTION BILATERAL W/ ANTERIOR VITRECTOMY      CATARACT EXTRACTION W/  INTRAOCULAR LENS IMPLANT  OU    KNEE SURGERY      right         Updated/ annual due 2/21:  HM: 12/19 fluvax, 3/19 HAV, 1/15 [id number], 10/13 [id number] booster, 3/19 TDaP, 3/20 MMG/ Gyn Dr. Jiang, 1/16 BMD low fx risk rep 5y, 7/12 Cscope rep 10y, 2/17 HCV neg.     Review of Systems   Constitutional: Negative for chills, diaphoresis and fever.   Respiratory: Negative for cough and shortness of breath.    Cardiovascular: Negative for chest pain, palpitations and leg swelling.   Gastrointestinal: Negative for blood in stool, constipation, diarrhea, nausea and vomiting.   Genitourinary: Negative for dysuria, frequency and hematuria.   Psychiatric/Behavioral: The patient is not nervous/anxious.          Objective:   /68   Pulse 88   Temp 97.9 °F (36.6 °C)   Ht 5' 5" (1.651 m)   Wt 101.6 kg (223 lb 15.8 oz)   LMP 01/07/2015   SpO2 99%   BMI 37.27 kg/m²     Physical Exam  Constitutional:       Appearance: She is well-developed.   Neck:      Musculoskeletal: Neck supple.      Thyroid: No thyroid mass.      Vascular: No carotid bruit.   Cardiovascular:      Rate and Rhythm: Normal rate and " regular rhythm.      Heart sounds: No murmur. No friction rub. No gallop.    Pulmonary:      Effort: Pulmonary effort is normal.      Breath sounds: Normal breath sounds. No wheezing or rales.   Abdominal:      General: Bowel sounds are normal.      Palpations: Abdomen is soft. There is no mass.      Tenderness: There is no abdominal tenderness.   Lymphadenopathy:      Cervical: No cervical adenopathy.   Neurological:      Mental Status: She is alert and oriented to person, place, and time.       CXR and EKG normal.      Assessment:       1. Preop cardiovascular exam    2. Left foot pain    3. URBAN (obstructive sleep apnea)          Plan:     Preop cardiovascular exam, Left foot pain- Pt is clear for anesthesia and surgery with Prince Index Class I, very low risk for perioperative complications.  Stop NSAIDs 7d prior to surgery.  URBAN.

## 2020-08-26 NOTE — H&P (VIEW-ONLY)
"Subjective:      Patient ID: Taryn Serrano is a 55 y.o. female.    Chief Complaint: Pre-op Exam      HPI  Patient is here for follow up of medical problems and preoperative evaluation for left foot surgery, Dr. Simmons, on 9/18/20.  No history of problems with anesthesia.  No bleeding diathesis.  No exertional cp/sob/palp.  No significant renal or hepatic disease.      Past Medical History:   Diagnosis Date    Anxiety and depression     Asthma, exercise induced     Dry eyes     History of ADHD     Hypothyroid     URBAN (obstructive sleep apnea)     Vitamin D deficiency        Past Surgical History:   Procedure Laterality Date    ANKLE SURGERY Right     BREAST BIOPSY Right 1990's    benign    CARPAL TUNNEL RELEASE Bilateral     CATARACT EXTRACTION BILATERAL W/ ANTERIOR VITRECTOMY      CATARACT EXTRACTION W/  INTRAOCULAR LENS IMPLANT  OU    KNEE SURGERY      right         Updated/ annual due 2/21:  HM: 12/19 fluvax, 3/19 HAV, 1/15 [id number], 10/13 [id number] booster, 3/19 TDaP, 3/20 MMG/ Gyn Dr. Jiang, 1/16 BMD low fx risk rep 5y, 7/12 Cscope rep 10y, 2/17 HCV neg.     Review of Systems   Constitutional: Negative for chills, diaphoresis and fever.   Respiratory: Negative for cough and shortness of breath.    Cardiovascular: Negative for chest pain, palpitations and leg swelling.   Gastrointestinal: Negative for blood in stool, constipation, diarrhea, nausea and vomiting.   Genitourinary: Negative for dysuria, frequency and hematuria.   Psychiatric/Behavioral: The patient is not nervous/anxious.          Objective:   /68   Pulse 88   Temp 97.9 °F (36.6 °C)   Ht 5' 5" (1.651 m)   Wt 101.6 kg (223 lb 15.8 oz)   LMP 01/07/2015   SpO2 99%   BMI 37.27 kg/m²     Physical Exam  Constitutional:       Appearance: She is well-developed.   Neck:      Musculoskeletal: Neck supple.      Thyroid: No thyroid mass.      Vascular: No carotid bruit.   Cardiovascular:      Rate and Rhythm: Normal rate and " regular rhythm.      Heart sounds: No murmur. No friction rub. No gallop.    Pulmonary:      Effort: Pulmonary effort is normal.      Breath sounds: Normal breath sounds. No wheezing or rales.   Abdominal:      General: Bowel sounds are normal.      Palpations: Abdomen is soft. There is no mass.      Tenderness: There is no abdominal tenderness.   Lymphadenopathy:      Cervical: No cervical adenopathy.   Neurological:      Mental Status: She is alert and oriented to person, place, and time.       CXR and EKG normal.      Assessment:       1. Preop cardiovascular exam    2. Left foot pain    3. URBAN (obstructive sleep apnea)          Plan:     Preop cardiovascular exam, Left foot pain- Pt is clear for anesthesia and surgery with Prince Index Class I, very low risk for perioperative complications.  Stop NSAIDs 7d prior to surgery.  URBAN.

## 2020-09-02 ENCOUNTER — OFFICE VISIT (OUTPATIENT)
Dept: FAMILY MEDICINE | Facility: CLINIC | Age: 55
End: 2020-09-02
Payer: COMMERCIAL

## 2020-09-02 ENCOUNTER — HOSPITAL ENCOUNTER (OUTPATIENT)
Dept: RADIOLOGY | Facility: HOSPITAL | Age: 55
Discharge: HOME | End: 2020-09-02
Attending: PODIATRIST
Payer: COMMERCIAL

## 2020-09-02 VITALS
BODY MASS INDEX: 37.32 KG/M2 | WEIGHT: 224 LBS | TEMPERATURE: 98 F | HEART RATE: 88 BPM | SYSTOLIC BLOOD PRESSURE: 100 MMHG | HEIGHT: 65 IN | DIASTOLIC BLOOD PRESSURE: 68 MMHG | OXYGEN SATURATION: 99 %

## 2020-09-02 DIAGNOSIS — M79.672 LEFT FOOT PAIN: ICD-10-CM

## 2020-09-02 DIAGNOSIS — Z01.818 PREOP TESTING: ICD-10-CM

## 2020-09-02 DIAGNOSIS — Z01.810 PREOP CARDIOVASCULAR EXAM: Primary | ICD-10-CM

## 2020-09-02 DIAGNOSIS — G47.33 OSA (OBSTRUCTIVE SLEEP APNEA): ICD-10-CM

## 2020-09-02 PROCEDURE — 3008F BODY MASS INDEX DOCD: CPT | Mod: CPTII,S$GLB,, | Performed by: INTERNAL MEDICINE

## 2020-09-02 PROCEDURE — 3008F PR BODY MASS INDEX (BMI) DOCUMENTED: ICD-10-PCS | Mod: CPTII,S$GLB,, | Performed by: INTERNAL MEDICINE

## 2020-09-02 PROCEDURE — 71046 X-RAY EXAM CHEST 2 VIEWS: CPT | Mod: TC,FY,PO

## 2020-09-02 PROCEDURE — 99214 OFFICE O/P EST MOD 30 MIN: CPT | Mod: S$GLB,,, | Performed by: INTERNAL MEDICINE

## 2020-09-02 PROCEDURE — 99999 PR PBB SHADOW E&M-EST. PATIENT-LVL IV: ICD-10-PCS | Mod: PBBFAC,,, | Performed by: INTERNAL MEDICINE

## 2020-09-02 PROCEDURE — 99214 PR OFFICE/OUTPT VISIT, EST, LEVL IV, 30-39 MIN: ICD-10-PCS | Mod: S$GLB,,, | Performed by: INTERNAL MEDICINE

## 2020-09-02 PROCEDURE — 71046 XR CHEST PA AND LATERAL: ICD-10-PCS | Mod: 26,,, | Performed by: RADIOLOGY

## 2020-09-02 PROCEDURE — 71046 X-RAY EXAM CHEST 2 VIEWS: CPT | Mod: 26,,, | Performed by: RADIOLOGY

## 2020-09-02 PROCEDURE — 99999 PR PBB SHADOW E&M-EST. PATIENT-LVL IV: CPT | Mod: PBBFAC,,, | Performed by: INTERNAL MEDICINE

## 2020-09-03 DIAGNOSIS — M77.41 METATARSALGIA OF BOTH FEET: ICD-10-CM

## 2020-09-03 DIAGNOSIS — M77.42 METATARSALGIA OF BOTH FEET: ICD-10-CM

## 2020-09-03 DIAGNOSIS — M20.41 HAMMER TOES OF BOTH FEET: ICD-10-CM

## 2020-09-03 DIAGNOSIS — M24.572 CONTRACTURE, LEFT ANKLE: ICD-10-CM

## 2020-09-03 DIAGNOSIS — M20.12 HALLUX VALGUS (ACQUIRED), LEFT FOOT: Primary | ICD-10-CM

## 2020-09-03 DIAGNOSIS — M20.42 HAMMER TOES OF BOTH FEET: ICD-10-CM

## 2020-09-07 ENCOUNTER — PATIENT MESSAGE (OUTPATIENT)
Dept: FAMILY MEDICINE | Facility: CLINIC | Age: 55
End: 2020-09-07

## 2020-09-07 DIAGNOSIS — H00.011 HORDEOLUM EXTERNUM OF RIGHT UPPER EYELID: Primary | ICD-10-CM

## 2020-09-08 ENCOUNTER — PATIENT MESSAGE (OUTPATIENT)
Dept: FAMILY MEDICINE | Facility: CLINIC | Age: 55
End: 2020-09-08

## 2020-09-08 RX ORDER — CIPROFLOXACIN AND DEXAMETHASONE 3; 1 MG/ML; MG/ML
4 SUSPENSION/ DROPS AURICULAR (OTIC) 2 TIMES DAILY
Qty: 7.5 ML | Refills: 0 | Status: SHIPPED | OUTPATIENT
Start: 2020-09-08 | End: 2020-09-09

## 2020-09-08 NOTE — TELEPHONE ENCOUNTER
Pt stated she does not want to schedule appt with specialist. Pt stated she does not want to go into clinic. Pt stated she thinks you can just call her in something, and maybe do a virtual appt.

## 2020-09-09 ENCOUNTER — TELEPHONE (OUTPATIENT)
Dept: FAMILY MEDICINE | Facility: CLINIC | Age: 55
End: 2020-09-09

## 2020-09-09 RX ORDER — NEOMYCIN/POLYMYXIN B/HYDROCORT 3.5-10K-1
1-2 SUSPENSION, DROPS(FINAL DOSAGE FORM)(ML) OPHTHALMIC (EYE) 4 TIMES DAILY
Qty: 7.5 ML | Refills: 0 | Status: SHIPPED | OUTPATIENT
Start: 2020-09-09 | End: 2020-09-16

## 2020-09-09 RX ORDER — DEXAMETHASONE SODIUM PHOSPHATE 1 MG/ML
SOLUTION/ DROPS OPHTHALMIC
Qty: 5 ML | Refills: 0 | OUTPATIENT
Start: 2020-09-09

## 2020-09-09 RX ORDER — ERYTHROMYCIN 5 MG/G
OINTMENT OPHTHALMIC 3 TIMES DAILY
Qty: 1 G | Refills: 1 | Status: SHIPPED | OUTPATIENT
Start: 2020-09-09 | End: 2020-09-16

## 2020-09-09 NOTE — TELEPHONE ENCOUNTER
----- Message from Gaurang Harris sent at 9/9/2020  9:55 AM CDT -----  Type:  Pharmacy Calling to Clarify an RX    Name of Caller:Luis   Pharmacy Name:[facility]  Prescription Name:  What do they need to clarify?:  Best Call Back Number:[phone]  Additional Information: needs clarification on ear drops, please call back

## 2020-09-15 ENCOUNTER — LAB VISIT (OUTPATIENT)
Dept: OTOLARYNGOLOGY | Facility: CLINIC | Age: 55
End: 2020-09-15
Payer: COMMERCIAL

## 2020-09-15 DIAGNOSIS — Z01.818 PREOP TESTING: ICD-10-CM

## 2020-09-15 LAB — SARS-COV-2 RNA RESP QL NAA+PROBE: NOT DETECTED

## 2020-09-15 PROCEDURE — U0003 INFECTIOUS AGENT DETECTION BY NUCLEIC ACID (DNA OR RNA); SEVERE ACUTE RESPIRATORY SYNDROME CORONAVIRUS 2 (SARS-COV-2) (CORONAVIRUS DISEASE [COVID-19]), AMPLIFIED PROBE TECHNIQUE, MAKING USE OF HIGH THROUGHPUT TECHNOLOGIES AS DESCRIBED BY CMS-2020-01-R: HCPCS

## 2020-09-16 RX ORDER — IBUPROFEN 200 MG
200 TABLET ORAL EVERY 6 HOURS PRN
COMMUNITY
End: 2021-08-18

## 2020-09-16 NOTE — PRE ADMISSION SCREENING
Pre op instructions reviewed with patient per phone:    To confirm, Your surgeon has instructed you:  Surgery is scheduled 09/18/20at 0830.      Please report to [facility] main lobby by 0700.  Pre admit office to call afternoon prior to surgery with final arrival time.  If surgery is on Monday, Pre admit office to call Friday afternoon with with final arrival time.    Covid 19 testing is scheduled for 09/15/20 at 0840 @ [facility]  Please self quarantine after Covid testing, prior to surgery    INSTRUCTIONS IMPORTANT!!!  ¨ No smoking after 12 midnight, the night before surgery.  ¨ No solid food after 12 midnight, but you may have clear liquids up until 3 hours prior to surgery.  This includes: grape, cranberry, and apple juice (not orange, and no coffee.)   ¨ OK to brush teeth, but no gum, candy or mints!    ¨ Take only these medicines with a small swallow of water-morning of surgery.  Wellbutrin, Prozac, Synthroid, use Fluticasone  inhal         ____   Due to COVID 19 concerns, 1 visitor will be allowed in the pre operative area, and must adhere to social distancing guidelines.  One visitor/family member is currently allowed to visit in-patient rooms from 08:00 a.m to 6:00 p.m  ____   Family/caregivers will be updated re pt status via text/cell phone      ____  Do not wear makeup, including mascara.  ____  No powder, lotions or creams to surgical area.  ____  Please remove all jewelry, including piercings and leave at home.  ____  No money or valuables needed. Please leave at home.  ____  Please bring identification and insurance information to hospital.  ____  If going home the same day, arrange for a ride home. You will not be able to   drive if Anesthesia was used.  ____  Children, under 12 years old, must remain in the waiting room with an adult.  They are not allowed in patient areas.  ____  Wear loose fitting clothing. Allow for dressings, bandages.  ____  Stop Aspirin,  Ibuprofen, Motrin and Aleve at least 5-7 days before surgery, unless otherwise instructed by your doctor, or the nurse.   You MAY use Tylenol/acetaminophen until day of surgery.  ____  If you take diabetic medication, do not take am of surgery unless instructed by   Doctor.  ____ Stop taking any Fish Oil supplement or any Vitamins that contain Vitamin E at least 5 days prior to surgery.          Bathing Instructions-- The night before surgery and the morning prior to coming to the hospital:   -Do not shave the surgical area.   -Shower and wash your hair and body as usual with your regular soap and shampoo.   -Rinse your hair and body completely.   -Use one packet of hibiclens to wash the surgical site (using your hand) gently for 5 minutes.  Do not scrub you skin too hard.   -Do not use hibiclens on your head, face, or genitals.   -Do not wash with regular soap after you use the hibiclens.   -Rinse your body thoroughly.   -Dry with clean, soft towel.  Do not use lotion, cream, deodorant, or powders on   the surgical site.    Use antibacterial soap in place of hibiclens if your surgery is on the head, face or genitals.         Surgical Site Infection    Prevention of surgical site infections:     -Keep incisions clean and dry.   -Do not soak/submerge incisions in water until completely healed.   -Do not apply lotions, powders, creams, or deodorants to site.   -Always make sure hands are cleaned with antibacterial soap/ alcohol-based   prior to touching the surgical site.  (This includes doctors, nurses, staff, and yourself.)    Signs and symptoms:   -Redness and pain around the area where you had surgery   -Drainage of cloudy fluid from your surgical wound   -Fever over 100.4  I have read or had read and explained to me, and understand the above information.

## 2020-09-18 ENCOUNTER — HOSPITAL ENCOUNTER (OUTPATIENT)
Facility: HOSPITAL | Age: 55
Discharge: HOME | End: 2020-09-18
Attending: PODIATRIST | Admitting: PODIATRIST
Payer: COMMERCIAL

## 2020-09-18 ENCOUNTER — ANESTHESIA EVENT (OUTPATIENT)
Dept: SURGERY | Facility: HOSPITAL | Age: 55
End: 2020-09-18
Payer: COMMERCIAL

## 2020-09-18 ENCOUNTER — ANESTHESIA (OUTPATIENT)
Dept: SURGERY | Facility: HOSPITAL | Age: 55
End: 2020-09-18
Payer: COMMERCIAL

## 2020-09-18 ENCOUNTER — HOSPITAL ENCOUNTER (OUTPATIENT)
Dept: RADIOLOGY | Facility: HOSPITAL | Age: 55
Discharge: HOME | End: 2020-09-18
Attending: PODIATRIST | Admitting: PODIATRIST
Payer: COMMERCIAL

## 2020-09-18 DIAGNOSIS — M21.612 BUNION, LEFT FOOT: ICD-10-CM

## 2020-09-18 DIAGNOSIS — M20.40 HAMMER TOE: ICD-10-CM

## 2020-09-18 PROCEDURE — 28285 REPAIR OF HAMMERTOE: CPT | Mod: 59,T1,, | Performed by: PODIATRIST

## 2020-09-18 PROCEDURE — 37000009 HC ANESTHESIA EA ADD 15 MINS: Performed by: PODIATRIST

## 2020-09-18 PROCEDURE — 27685 REVISION OF LOWER LEG TENDON: CPT | Mod: 51,LT,, | Performed by: PODIATRIST

## 2020-09-18 PROCEDURE — 63600175 PHARM REV CODE 636 W HCPCS: Performed by: NURSE ANESTHETIST, CERTIFIED REGISTERED

## 2020-09-18 PROCEDURE — 63600175 PHARM REV CODE 636 W HCPCS: Performed by: ANESTHESIOLOGY

## 2020-09-18 PROCEDURE — 73620 X-RAY EXAM OF FOOT: CPT | Mod: TC,LT

## 2020-09-18 PROCEDURE — 36000708 HC OR TIME LEV III 1ST 15 MIN: Performed by: PODIATRIST

## 2020-09-18 PROCEDURE — 71000033 HC RECOVERY, INTIAL HOUR: Performed by: PODIATRIST

## 2020-09-18 PROCEDURE — 25000003 PHARM REV CODE 250: Performed by: PODIATRIST

## 2020-09-18 PROCEDURE — 28308 PR OSTEOTOMY METATARSAL (NOT 1ST): ICD-10-PCS | Mod: 59,T1,, | Performed by: PODIATRIST

## 2020-09-18 PROCEDURE — 27685 PR LENGTH/SHORT LEG/ANKL TENDON,SINGLE: ICD-10-PCS | Mod: 51,LT,, | Performed by: PODIATRIST

## 2020-09-18 PROCEDURE — 76000 FLUOROSCOPY <1 HR PHYS/QHP: CPT | Mod: TC

## 2020-09-18 PROCEDURE — C1769 GUIDE WIRE: HCPCS | Performed by: PODIATRIST

## 2020-09-18 PROCEDURE — 28270 RELEASE OF FOOT CONTRACTURE: CPT | Mod: 59,T1,, | Performed by: PODIATRIST

## 2020-09-18 PROCEDURE — C1713 ANCHOR/SCREW BN/BN,TIS/BN: HCPCS | Performed by: PODIATRIST

## 2020-09-18 PROCEDURE — 27800903 OPTIME MED/SURG SUP & DEVICES OTHER IMPLANTS: Performed by: PODIATRIST

## 2020-09-18 PROCEDURE — 28297 COR HLX VLGS JT ARTHRD: CPT | Mod: TA,,, | Performed by: PODIATRIST

## 2020-09-18 PROCEDURE — 27201423 OPTIME MED/SURG SUP & DEVICES STERILE SUPPLY: Performed by: PODIATRIST

## 2020-09-18 PROCEDURE — 36000709 HC OR TIME LEV III EA ADD 15 MIN: Performed by: PODIATRIST

## 2020-09-18 PROCEDURE — 25000003 PHARM REV CODE 250: Performed by: NURSE ANESTHETIST, CERTIFIED REGISTERED

## 2020-09-18 PROCEDURE — 28297 PR CORRECT BUNION, ANY METHOD: ICD-10-PCS | Mod: TA,,, | Performed by: PODIATRIST

## 2020-09-18 PROCEDURE — 28308 INCISION OF METATARSAL: CPT | Mod: 59,T1,, | Performed by: PODIATRIST

## 2020-09-18 PROCEDURE — 28270 PR CAPSULOTOMY MT-P JT,FOOT,EACH: ICD-10-PCS | Mod: 59,T1,, | Performed by: PODIATRIST

## 2020-09-18 PROCEDURE — 71000015 HC POSTOP RECOV 1ST HR: Performed by: PODIATRIST

## 2020-09-18 PROCEDURE — 28285 PR REPAIR OF HAMMERTOE,ONE: ICD-10-PCS | Mod: 59,T1,, | Performed by: PODIATRIST

## 2020-09-18 PROCEDURE — 37000008 HC ANESTHESIA 1ST 15 MINUTES: Performed by: PODIATRIST

## 2020-09-18 DEVICE — IMPLANTABLE DEVICE: Type: IMPLANTABLE DEVICE | Site: FOOT | Status: FUNCTIONAL

## 2020-09-18 RX ORDER — DIPHENHYDRAMINE HYDROCHLORIDE 50 MG/ML
25 INJECTION INTRAMUSCULAR; INTRAVENOUS EVERY 6 HOURS PRN
Status: DISCONTINUED | OUTPATIENT
Start: 2020-09-18 | End: 2020-09-18 | Stop reason: HOSPADM

## 2020-09-18 RX ORDER — MEPERIDINE HYDROCHLORIDE 25 MG/ML
12.5 INJECTION INTRAMUSCULAR; INTRAVENOUS; SUBCUTANEOUS ONCE AS NEEDED
Status: DISCONTINUED | OUTPATIENT
Start: 2020-09-18 | End: 2020-09-18 | Stop reason: HOSPADM

## 2020-09-18 RX ORDER — DOXYCYCLINE 100 MG/1
100 CAPSULE ORAL EVERY 12 HOURS
Qty: 14 CAPSULE | Refills: 0 | Status: SHIPPED | OUTPATIENT
Start: 2020-09-18 | End: 2020-09-25

## 2020-09-18 RX ORDER — GABAPENTIN 300 MG/1
300 CAPSULE ORAL 2 TIMES DAILY
Qty: 120 CAPSULE | Refills: 1 | Status: SHIPPED | OUTPATIENT
Start: 2020-09-18 | End: 2021-06-10

## 2020-09-18 RX ORDER — LIDOCAINE HYDROCHLORIDE 10 MG/ML
INJECTION, SOLUTION EPIDURAL; INFILTRATION; INTRACAUDAL; PERINEURAL
Status: DISCONTINUED | OUTPATIENT
Start: 2020-09-18 | End: 2020-09-18

## 2020-09-18 RX ORDER — SODIUM CHLORIDE 0.9 % (FLUSH) 0.9 %
3 SYRINGE (ML) INJECTION EVERY 8 HOURS
Status: DISCONTINUED | OUTPATIENT
Start: 2020-09-18 | End: 2020-09-18 | Stop reason: HOSPADM

## 2020-09-18 RX ORDER — HYDROMORPHONE HYDROCHLORIDE 2 MG/ML
0.2 INJECTION, SOLUTION INTRAMUSCULAR; INTRAVENOUS; SUBCUTANEOUS EVERY 5 MIN PRN
Status: DISCONTINUED | OUTPATIENT
Start: 2020-09-18 | End: 2020-09-18 | Stop reason: HOSPADM

## 2020-09-18 RX ORDER — CLINDAMYCIN PHOSPHATE 900 MG/50ML
900 INJECTION, SOLUTION INTRAVENOUS
Status: COMPLETED | OUTPATIENT
Start: 2020-09-18 | End: 2020-09-18

## 2020-09-18 RX ORDER — ROCURONIUM BROMIDE 10 MG/ML
INJECTION, SOLUTION INTRAVENOUS
Status: DISCONTINUED | OUTPATIENT
Start: 2020-09-18 | End: 2020-09-18

## 2020-09-18 RX ORDER — SODIUM CHLORIDE, SODIUM LACTATE, POTASSIUM CHLORIDE, CALCIUM CHLORIDE 600; 310; 30; 20 MG/100ML; MG/100ML; MG/100ML; MG/100ML
INJECTION, SOLUTION INTRAVENOUS CONTINUOUS PRN
Status: DISCONTINUED | OUTPATIENT
Start: 2020-09-18 | End: 2020-09-18

## 2020-09-18 RX ORDER — METOCLOPRAMIDE HYDROCHLORIDE 5 MG/ML
10 INJECTION INTRAMUSCULAR; INTRAVENOUS EVERY 10 MIN PRN
Status: DISCONTINUED | OUTPATIENT
Start: 2020-09-18 | End: 2020-09-18 | Stop reason: HOSPADM

## 2020-09-18 RX ORDER — SUCCINYLCHOLINE CHLORIDE 20 MG/ML
INJECTION INTRAMUSCULAR; INTRAVENOUS
Status: DISCONTINUED | OUTPATIENT
Start: 2020-09-18 | End: 2020-09-18

## 2020-09-18 RX ORDER — ONDANSETRON 2 MG/ML
INJECTION INTRAMUSCULAR; INTRAVENOUS
Status: DISCONTINUED | OUTPATIENT
Start: 2020-09-18 | End: 2020-09-18

## 2020-09-18 RX ORDER — FENTANYL CITRATE 50 UG/ML
INJECTION, SOLUTION INTRAMUSCULAR; INTRAVENOUS
Status: DISCONTINUED | OUTPATIENT
Start: 2020-09-18 | End: 2020-09-18

## 2020-09-18 RX ORDER — DEXAMETHASONE SODIUM PHOSPHATE 4 MG/ML
INJECTION, SOLUTION INTRA-ARTICULAR; INTRALESIONAL; INTRAMUSCULAR; INTRAVENOUS; SOFT TISSUE
Status: DISCONTINUED | OUTPATIENT
Start: 2020-09-18 | End: 2020-09-18

## 2020-09-18 RX ORDER — PROPOFOL 10 MG/ML
VIAL (ML) INTRAVENOUS
Status: DISCONTINUED | OUTPATIENT
Start: 2020-09-18 | End: 2020-09-18

## 2020-09-18 RX ORDER — LIDOCAINE HYDROCHLORIDE 10 MG/ML
INJECTION, SOLUTION EPIDURAL; INFILTRATION; INTRACAUDAL; PERINEURAL
Status: DISCONTINUED | OUTPATIENT
Start: 2020-09-18 | End: 2020-09-18 | Stop reason: HOSPADM

## 2020-09-18 RX ORDER — MIDAZOLAM HYDROCHLORIDE 1 MG/ML
INJECTION, SOLUTION INTRAMUSCULAR; INTRAVENOUS
Status: DISCONTINUED | OUTPATIENT
Start: 2020-09-18 | End: 2020-09-18

## 2020-09-18 RX ORDER — OXYCODONE AND ACETAMINOPHEN 10; 325 MG/1; MG/1
1 TABLET ORAL EVERY 6 HOURS PRN
Qty: 28 TABLET | Refills: 0 | Status: SHIPPED | OUTPATIENT
Start: 2020-09-18 | End: 2020-09-25

## 2020-09-18 RX ORDER — BUPIVACAINE HYDROCHLORIDE AND EPINEPHRINE 5; 5 MG/ML; UG/ML
INJECTION, SOLUTION EPIDURAL; INTRACAUDAL; PERINEURAL
Status: DISCONTINUED | OUTPATIENT
Start: 2020-09-18 | End: 2020-09-18 | Stop reason: HOSPADM

## 2020-09-18 RX ADMIN — PROPOFOL 160 MG: 10 INJECTION, EMULSION INTRAVENOUS at 09:09

## 2020-09-18 RX ADMIN — HYDROMORPHONE HYDROCHLORIDE 0.2 MG: 2 INJECTION, SOLUTION INTRAMUSCULAR; INTRAVENOUS; SUBCUTANEOUS at 01:09

## 2020-09-18 RX ADMIN — ONDANSETRON 4 MG: 2 INJECTION, SOLUTION INTRAMUSCULAR; INTRAVENOUS at 09:09

## 2020-09-18 RX ADMIN — SODIUM CHLORIDE, SODIUM LACTATE, POTASSIUM CHLORIDE, AND CALCIUM CHLORIDE: 600; 310; 30; 20 INJECTION, SOLUTION INTRAVENOUS at 09:09

## 2020-09-18 RX ADMIN — DEXAMETHASONE SODIUM PHOSPHATE 4 MG: 4 INJECTION, SOLUTION INTRA-ARTICULAR; INTRALESIONAL; INTRAMUSCULAR; INTRAVENOUS; SOFT TISSUE at 09:09

## 2020-09-18 RX ADMIN — SODIUM CHLORIDE, SODIUM LACTATE, POTASSIUM CHLORIDE, AND CALCIUM CHLORIDE: 600; 310; 30; 20 INJECTION, SOLUTION INTRAVENOUS at 10:09

## 2020-09-18 RX ADMIN — CLINDAMYCIN PHOSPHATE 900 MG: 18 INJECTION, SOLUTION INTRAVENOUS at 09:09

## 2020-09-18 RX ADMIN — FENTANYL CITRATE 100 MCG: 50 INJECTION, SOLUTION INTRAMUSCULAR; INTRAVENOUS at 09:09

## 2020-09-18 RX ADMIN — ROCURONIUM BROMIDE 5 MG: 10 INJECTION, SOLUTION INTRAVENOUS at 09:09

## 2020-09-18 RX ADMIN — SUCCINYLCHOLINE CHLORIDE 100 MG: 20 INJECTION, SOLUTION INTRAMUSCULAR; INTRAVENOUS at 09:09

## 2020-09-18 RX ADMIN — MIDAZOLAM 2 MG: 1 INJECTION INTRAMUSCULAR; INTRAVENOUS at 09:09

## 2020-09-18 RX ADMIN — LIDOCAINE HYDROCHLORIDE 50 MG: 10 INJECTION, SOLUTION EPIDURAL; INFILTRATION; INTRACAUDAL; PERINEURAL at 09:09

## 2020-09-18 NOTE — TRANSFER OF CARE
"Anesthesia Transfer of Care Note    Patient: Taryn Serrano    Procedure(s) Performed: Procedure(s) (LRB):  BUNIONECTOMY, LAPIDUS (Left)  FUSION, JOINT, TOE (Left)  OSTEOTOMY, METATARSAL BONE (2nd) (Left)  LENGTHENING, TENDON, ACHILLES (Left)    Patient location: PACU    Anesthesia Type: general    Transport from OR: Transported from OR on room air with adequate spontaneous ventilation    Post pain: adequate analgesia    Post assessment: no apparent anesthetic complications    Post vital signs: stable    Level of consciousness: responds to stimulation    Nausea/Vomiting: no nausea/vomiting    Complications: none    Transfer of care protocol was followed      Last vitals:   Visit Vitals  /66 (BP Location: Right arm, Patient Position: Lying)   Pulse 77   Temp 37.1 °C (98.7 °F) (Temporal)   Resp 12   Ht 5' 5" (1.651 m)   Wt 102.5 kg (225 lb 15.5 oz)   LMP 01/07/2015   SpO2 (!) 93%   Breastfeeding No   BMI 37.60 kg/m²     "

## 2020-09-18 NOTE — INTERVAL H&P NOTE
The patient has been examined and the H&P has been reviewed:    I concur with the findings and no changes have occurred since H&P was written.    Surgery risks, benefits and alternative options discussed and understood by patient/family.          Active Hospital Problems    Diagnosis  POA    Bunion, left foot [M21.532]  Yes      Resolved Hospital Problems   No resolved problems to display.

## 2020-09-18 NOTE — ANESTHESIA PROCEDURE NOTES
Intubation  Performed by: Laci Smalls CRNA  Authorized by: Jose Carlos Rutledge MD     Intubation:     Induction:  Intravenous    Intubated:  Postinduction    Mask Ventilation:  Easy mask    Attempts:  2    Attempted By:  CRNA    Blade:  Buckner 2    Laryngeal View Grade: Grade III - only epiglottis visible      Attempted By (2nd Attempt):  CRNA    Method of Intubation (2nd Attempt):  Video laryngoscopy    Blade (2nd Attempt):  Glidescope 3    Laryngeal View Grade (2nd Attempt): Grade I - full view of cords      Difficult Airway Encountered?: No      Complications:  None

## 2020-09-18 NOTE — INTERVAL H&P NOTE
The patient has been examined and the H&P has been reviewed:    I concur with the findings and no changes have occurred since H&P was written.     risks, benefits and alternative options discussed and understood by patient/family.          Active Hospital Problems    Diagnosis  POA    Bunion, left foot [M21.2]  Yes      Resolved Hospital Problems   No resolved problems to display.

## 2020-09-18 NOTE — BRIEF OP NOTE
[facility]  Brief Operative Note    Surgery Date: 9/18/2020     Surgeon(s) and Role:     * Krzysztof Simmons DPM - Primary    Assisting Surgeon: None    Pre-op Diagnosis:  Hallux valgus (acquired), left foot [M20.12]  Metatarsalgia of both feet [M77.41, M77.42]  Hammer toes of both feet [M20.41, M20.42]  Contracture, left ankle [M24.572]    Post-op Diagnosis:  Post-Op Diagnosis Codes:     * Hallux valgus (acquired), left foot [M20.12]     * Metatarsalgia of both feet [M77.41, M77.42]     * Hammer toes of both feet [M20.41, M20.42]     * Contracture, left ankle [M24.572]    Procedure(s) (LRB):  BUNIONECTOMY, LAPIDUS (Left)  FUSION, JOINT, TOE (Left)  OSTEOTOMY, METATARSAL BONE (2nd) (Left)  LENGTHENING, TENDON, ACHILLES (Left)    Anesthesia: General    Description of the findings of the procedure(s):    1. Lapidus arthrodesis, left foot.   2. Arthrodesis, left 2nd digit hammer toe.   3. Arthrodesis, left 3rd digit hammer toe.   4. Weil metatarsal osteotomy, left 2nd.    5. Repair of MTPJ dislocation, left 2nd.    6. Repair of MTPJ dislocation, left 3rd.   7. Tendo-Achilles lengthening, left.    Estimated Blood Loss: * No values recorded between 9/18/2020  9:38 AM and 9/18/2020 12:36 PM *         Specimens:   Specimen (12h ago, onward)    None            Discharge Note    OUTCOME: Patient tolerated treatment/procedure well without complication and is now ready for discharge.    DISPOSITION: Home or Self Care    FINAL DIAGNOSIS:  <principal problem not specified>    FOLLOWUP: In clinic    DISCHARGE INSTRUCTIONS:  No discharge procedures on file.     Clinical Reference Documents Added to Patient Instructions       Document    ACETAMINOPHEN; OXYCODONE TABLETS (ENGLISH)    DOXYCYCLINE TABLETS OR CAPSULES (ENGLISH)    GABAPENTIN CAPSULES OR TABLETS (ENGLISH)

## 2020-09-18 NOTE — ANESTHESIA PREPROCEDURE EVALUATION
09/18/2020  Taryn Serrano is a 55 y.o., female.  Patient Active Problem List   Diagnosis    Circadian rhythm sleep disorder, irregular sleep-wake type    Paving stone degeneration of peripheral retina    Periodic limb movement disorder    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    Asthma, exercise induced    URBAN (obstructive sleep apnea)    Acquired hypothyroidism    Anxiety and depression    Vitamin D deficiency    Pseudophakia of both eyes    Dry eye    PCO (posterior capsular opacification)    Obesity, Class II, BMI 35-39.9, no comorbidity    Synovial cyst of left popliteal space    Left ACL tear    Tear of medial meniscus of left knee    Other hyperlipidemia    CKD (chronic kidney disease) stage 3, GFR 30-59 ml/min    Osteopenia    Bunion, left foot     Current Facility-Administered Medications on File Prior to Encounter   Medication Dose Route Frequency Provider Last Rate Last Dose    ondansetron HCl (PF) 4 mg/2 mL injection    PRN Laci Smalls CRNA   4 mg at 07/07/15 0846     Current Outpatient Medications on File Prior to Encounter   Medication Sig Dispense Refill    albuterol (VENTOLIN HFA) 90 mcg/actuation inhaler Inhale 1-2 puffs into the lungs every 6 (six) hours as needed for Wheezing. RescueINHALE 1 TO 2 PUFFS BY MOUTH EVERY 6 HOURS AS NEEDED FOR WHEEZE (Patient taking differently: Inhale 1-2 puffs into the lungs every 6 (six) hours as needed for Wheezing (exercise induced). RescueINHALE 1 TO 2 PUFFS BY MOUTH EVERY 6 HOURS AS NEEDED FOR WHEEZE) 18 g 6    buPROPion (WELLBUTRIN XL) 300 MG 24 hr tablet TAKE 1 TABLET BY MOUTH EVERY DAY (Patient taking differently: Take 300 mg by mouth once daily. ) 90 tablet 3    cholecalciferol, vitamin D3, 2,000 unit Cap Take 1 capsule (2,000 Units total) by mouth once daily. (Patient taking differently: Take 1  capsule by mouth every other day. ) 100 capsule 6    FLUoxetine 40 MG capsule TAKE 2 CAPSULES (80 MG TOTAL) BY MOUTH ONCE DAILY. (Patient taking differently: Take 40 mg by mouth once daily. ) 180 capsule 2    fluticasone-salmeterol diskus inhaler 500-50 mcg Inhale 1 puff into the lungs 2 (two) times daily. 60 each 6    ibuprofen (ADVIL,MOTRIN) 200 MG tablet Take 200 mg by mouth every 6 (six) hours as needed for Pain.      levothyroxine (SYNTHROID) 112 MCG tablet TAKE 1 TABLET (112 MCG TOTAL) BY MOUTH ONCE DAILY. 90 tablet 3    meloxicam (MOBIC) 15 MG tablet TAKE ONE PILL DAILY AS NEEDED FOR PAIN (Patient taking differently: Take 15 mg by mouth daily as needed. Take one pill daily as needed for pain) 30 tablet 1    turmeric root extract 500 mg Cap Take 1,000 capsules by mouth once daily. 100 capsule 6    esomeprazole (NEXIUM) 40 MG capsule TAKE 1 CAPSULE (40 MG TOTAL) BY MOUTH BEFORE BREAKFAST. DAILY PRN (Patient taking differently: Take 40 mg by mouth daily as needed. daily PRN) 30 capsule 6    fluticasone (FLONASE) 50 mcg/actuation nasal spray 2 sprays (100 mcg total) by Each Nare route once daily. (Patient taking differently: 2 sprays by Each Nostril route daily as needed. ) 1 Bottle 3    metronidazole 0.75% (METROCREAM) 0.75 % Crea Apply topically 2 (two) times daily. 45 g 2     Past Surgical History:   Procedure Laterality Date    ANKLE SURGERY Right     BREAST BIOPSY Right     benign    CARPAL TUNNEL RELEASE Bilateral     CATARACT EXTRACTION BILATERAL W/ ANTERIOR VITRECTOMY      CATARACT EXTRACTION W/  INTRAOCULAR LENS IMPLANT  OU     SECTION      KNEE ARTHROSCOPY Left     KNEE SURGERY      right    WISDOM TOOTH EXTRACTION         Anesthesia Evaluation    I have reviewed the Patient Summary Reports.    I have reviewed the Nursing Notes.    I have reviewed the Medications.     Review of Systems  Anesthesia Hx:  No problems with previous Anesthesia  History of prior surgery of  interest to airway management or planning: Previous anesthesia: General  Denies Personal Hx of Anesthesia complications.   Social:  Non-Smoker    Hematology/Oncology:  Hematology Normal   Oncology Normal     EENT/Dental:EENT/Dental Normal   Cardiovascular:  Cardiovascular Normal  ECG has been reviewed.    Pulmonary:   Asthma Sleep Apnea    Renal/:   Chronic Renal Disease, CRI    Hepatic/GI:  Hepatic/GI Normal    Musculoskeletal:  Musculoskeletal Normal    Neurological:  Neurology Normal    Endocrine:   Hypothyroidism    Dermatological:  Skin Normal    Psych:   Psychiatric History anxiety depression          Physical Exam  General:  Well nourished, Obesity    Airway/Jaw/Neck:  Airway Findings: Mouth Opening: Normal Tongue: Normal  Mallampati: II      Dental:  Dental Findings: In tact   Chest/Lungs:  Chest/Lungs Clear    Heart/Vascular:  Heart Findings: Normal Heart murmur: negative       Mental Status:  Mental Status Findings:  Cooperative, Alert and Oriented         Chemistry        Component Value Date/Time     09/02/2020 1354    K 4.3 09/02/2020 1354     09/02/2020 1354    CO2 30 (H) 09/02/2020 1354    BUN 24 (H) 09/02/2020 1354    CREATININE 1.1 09/02/2020 1354    GLU 80 09/02/2020 1354        Component Value Date/Time    CALCIUM 9.5 09/02/2020 1354    ALKPHOS 117 02/18/2020 1615    AST 25 02/18/2020 1615    ALT 24 02/18/2020 1615    BILITOT 0.3 02/18/2020 1615    ESTGFRAFRICA >60.0 09/02/2020 1354    EGFRNONAA 55.9 (A) 09/02/2020 1354        Lab Results   Component Value Date    WBC 7.00 09/02/2020    HGB 13.7 09/02/2020    HCT 44.0 09/02/2020    MCV 96 09/02/2020     09/02/2020           Anesthesia Plan  Type of Anesthesia, risks & benefits discussed:  Anesthesia Type:  general  Patient's Preference:   Intra-op Monitoring Plan: standard ASA monitors  Intra-op Monitoring Plan Comments:   Post Op Pain Control Plan: multimodal analgesia  Post Op Pain Control Plan Comments:   Induction:    IV  Beta Blocker:  Patient is not currently on a Beta-Blocker (No further documentation required).       Informed Consent: Patient understands risks and agrees with Anesthesia plan.  Questions answered. Anesthesia consent signed with patient.  ASA Score: 2     Day of Surgery Review of History & Physical: I have interviewed and examined the patient. I have reviewed the patient's H&P dated:    H&P update referred to the surgeon.         Ready For Surgery From Anesthesia Perspective.

## 2020-09-18 NOTE — ANESTHESIA POSTPROCEDURE EVALUATION
Anesthesia Post Evaluation    Patient: Taryn Serrano    Procedure(s) Performed: Procedure(s) (LRB):  BUNIONECTOMY, LAPIDUS (Left)  FUSION, JOINT, TOE (Left)  OSTEOTOMY, METATARSAL BONE (2nd) (Left)  LENGTHENING, TENDON, ACHILLES (Left)    Final Anesthesia Type: general    Patient location during evaluation: PACU  Patient participation: Yes- Able to Participate  Level of consciousness: awake and alert, oriented and awake  Post-procedure vital signs: reviewed and stable  Pain management: adequate  Airway patency: patent    PONV status at discharge: No PONV  Anesthetic complications: no      Cardiovascular status: blood pressure returned to baseline  Respiratory status: unassisted and spontaneous ventilation  Hydration status: euvolemic  Follow-up not needed.          Vitals Value Taken Time   /75 09/18/20 1345   Temp 37.1 °C (98.7 °F) 09/18/20 1330   Pulse 70 09/18/20 1345   Resp 16 09/18/20 1345   SpO2 99 % 09/18/20 1345         Event Time   Out of Recovery 13:31:33         Pain/Denny Score: Pain Rating Prior to Med Admin: 5 (9/18/2020  1:15 PM)  Denny Score: 10 (9/18/2020  1:45 PM)

## 2020-09-18 NOTE — ANESTHESIA RELEASE NOTE
"Anesthesia Release from PACU Note    Patient: Taryn Serrano    Procedure(s) Performed: Procedure(s) (LRB):  BUNIONECTOMY, LAPIDUS (Left)  FUSION, JOINT, TOE (Left)  OSTEOTOMY, METATARSAL BONE (2nd) (Left)  LENGTHENING, TENDON, ACHILLES (Left)    Anesthesia type: general    Post pain: Adequate analgesia    Post assessment: no apparent anesthetic complications, tolerated procedure well and no evidence of recall    Last Vitals:   Visit Vitals  /66 (BP Location: Right arm, Patient Position: Lying)   Pulse 77   Temp 37.1 °C (98.7 °F) (Temporal)   Resp 12   Ht 5' 5" (1.651 m)   Wt 102.5 kg (225 lb 15.5 oz)   LMP 01/07/2015   SpO2 (!) 93%   Breastfeeding No   BMI 37.60 kg/m²       Post vital signs: stable    Level of consciousness: responds to stimulation    Nausea/Vomiting: no nausea/no vomiting    Complications: none    Airway Patency: patent    Respiratory: unassisted    Cardiovascular: stable and blood pressure at baseline    Hydration: euvolemic     "

## 2020-09-18 NOTE — OP NOTE
[facility]  Podiatric Medicine & Surgery  Operative Report    SUMMARY     Date of Procedure: 9/18/2020    Procedure: Procedure(s):  BUNIONECTOMY, LAPIDUS  FUSION, JOINT, TOE  OSTEOTOMY, METATARSAL BONE (2nd)  LENGTHENING, TENDON, ACHILLES    Surgeon(s) and Role: Surgeon(s) and Role:     * Krzysztof Simmons, DPM - Primary    Pre-Operative Diagnosis: Pre-Op Diagnosis Codes:     * Hallux valgus (acquired), left foot [M20.12]     * Metatarsalgia of both feet [M77.41, M77.42]     * Hammer toes of both feet [M20.41, M20.42]     * Contracture, left ankle [M24.572]    Post-Operative Diagnosis: Post-Op Diagnosis Codes:     * Hallux valgus (acquired), left foot [M20.12]     * Metatarsalgia of both feet [M77.41, M77.42]     * Hammer toes of both feet [M20.41, M20.42]     * Contracture, left ankle [M24.572]    Anesthesia: General    Technical Procedures Used:   1. Lapidus arthrodesis, left foot.   2. Arthrodesis, left 2nd digit hammer toe.   3. Arthrodesis, left 3rd digit hammer toe.   4. Weil metatarsal osteotomy, left 2nd.    5. Repair of MTPJ dislocation, left 2nd.    6. Repair of MTPJ dislocation, left 3rd.   7. Tendo-Achilles lengthening, left.    Description of the Findings of the Procedure: The patient was seen in the Holding Room. The risks, benefits, complications, treatment options, and expected outcomes were discussed with the patient. The risks and potential complications of their problem and purposed treatment include but are not limited to infection, nerve injury, vascular injury, nonunion/malunion/delayed union of the surgical site, persistent pain, potential skin necrosis, deep vein thrombosis, possible pulmonary embolus, complications of the anesthetics and failure of the implant.  The patient concurred with the proposed plan, giving informed consent. The patient is aware that the procedure may be a part of a staged collection of procedures for definitive cure and/or alleviation of  symptoms. The site of surgery properly noted/marked. Preoperative intravenous antibiotics are hanging at bedside, and are currently being administered via the heparin lock. The patient was taken to Operating Suite.    Once in the operative suite, the patient is transferred onto the operative table in the supine position. A TIME-OUT is taken as per protocol to identify the proper patient, procedure to be performed, and laterality.  The patient is properly positioned on the operating room table for ease of dissection and for any ancillary imaging.  A well-padded tourniquet was applied to the left mid-thigh.  Nursing and ancillary OR staff prepared the patient for the procedure. The patient is adequately sedated by the Attending Anesthesiologist and/or covering CRNA. Next, the operative limb was rendered sterile using chlorhexidine paint and scrub.  Sterile sheets and drapes were applied thereafter. Another TIME-OUT is taken as per protocol to identify the proper patient, procedure to be performed, and laterality.      Following this, a percutaneous triple janine section tendo-Achilles lengthening was performed.  The distal most incision is approximately 2.5 cm proximal to the insertion of the Achilles tendon on the calcaneus.  The next incision approximately 3 cm proximal to this and the proximal-most incisions approximately 3 cm proximal dislocation.  The proximal-most and distal-most incisions are severed in the same direction, central to lateral or central to medial.  The intermediate incision is opposite of the aforementioned and is either central to medial or central to lateral.  After adequate resection of these areas, the foot is dorsiflexed at the ankle, and tendon is effectively lengthened.  These incisions were not closed.    The tourniquet is elevated to 340mmHg after the limb is exsanguinated for approximately 2 min with an Esmarch bandage.    Following this, sharp lazy-S skin incision atop the 1st  metatarsal tarsal joint.  Deep dissection with tenotomy scissor.  Electrocautery as necessitated.  The extensor tendon is retracted laterally.  1st tarsometatarsal articulation is entered with a scalpel blade.  The ligaments are severed.  The cartilage was removed/debrided from either side of the joint using a combination osteotomes and curettes.  After adequate resection of joint cartilage, the area is subchondral drilled.  Following this, sharp skin incision at the medial aspect of the 1st metatarsophalangeal joint.  Deep dissection with tenotomy scissor.  Periosteal stripping with a Key elevator.  Any medial eminence is resected with a sagittal saw in line with the diaphysis of the 1st metatarsal.  Following this, the sesamoids were freed using a McGlamry elevator.  At this time, the intermetatarsal angle is reduced by hand pressure/manipulation, for trial.    At this time,  the proximal lateral base of the 1st metatarsal bone is resected with a sagittal saw. Following this, a 0.062 K-wires driven from dorsal to plantar in the metaphysis of the 1st metatarsal bone, proximally.  The valgus rotation/angulation of 1st metatarsal bone is reduced.  A point-to-point/bone hook is applied at the medial face of the 1st metatarsal head and the lateral face of the 2nd metatarsal head.  The intermetatarsal angle was reduced and is clamped into place.  Following this, the joint is feathered as needed, laterally for further reduction and closure of the intermetatarsal angle. The sesamoids were noted to be in satisfactory alignment. Following this, provisional fixation is applied with the assistance of fluoroscopy. Permanent fixation using a Mahendra compression plate.    The medial soft tissues of the 1st metatarsophalangeal joint are plicated and tightened with 2-0 Vicryl suture. Copious irrigation with sterile saline solution. Following this, all deep closure of the wound using 2-0 Vicryl suture.     Following this, sharp skin  incision at the dorsal aspect of the dorsal 2nd digit/ray/MTPJ with a scalpel blade. Deep dissection with a large tenotomy scissor. The PIPJ is entered with a scalpel blade.  The medial and lateral collateral ligaments were severed. The extensor tendon is removed from the dorsal surface of the head of the proximal interphalangeal joint and is retracted proximally.  The extensor chou/sling apparatus is also severed from its medial and lateral attachments to the base of proximal phalanx, and is retracted proximally.  Copious irrigation with sterile saline solution.  The head of the proximal phalanx is then transected with a sagittal saw.  The base of the middle phalanx was then transected in similar fashion.     A Kelikian push up test is then performed, and is determined that further corrective measures would be necessitated due to residule of contracture(s).     Following this, capsulotomy about the 2nd MTPJ with a scalpel blade. Capsulotomy of the medial, dorsal, lateral capsule as well as collateral ligaments for correction of digital contracture at the metatarsophalangeal joint. Copious lavage with sterile saline solution. EDL z-lengthening is performed. An EDB tenotomy is performed. Following this a another Kelikian push up test is performed and further dorsal contracture of the 2nd digit is noted.    The medial and lateral collateral ligaments of the  metatarsophalangeal joint are also severed with a scalpel blade. Following standard technique, a parallel osteotomy of the metatarsal is made with a sagittal saw.  The osteotomy is translated approximately 3 mm proximally.  The osteotomy was fixated using 12mm Mahendra spin screw.  The remaining overhanging bone of the metatarsal was removed with a bone rongeur.    A 0.045/0.062 is anterograde at the base of the middle phalanx and out the tip of the toe, and is then retrograded back across proximal phalanx using a sagittal saw, with the assistance of fluoroscopy.   The K-wire does not cross the MTPJ.  Fluoroscopy confirmed anatomic alignment.  Copious irrigation with sterile saline solution.    The lengthened extensor digitorum longus tendon is repaired using 3-0 Vicryl suture, being mindful to aggressively plantarflex the digit at the metatarsophalangeal joint. Subcuticular subcutaneous closure along the length of the incision using 3-0 Vicryl suture. The skin is closed using 3-0 Nylon.    Following this, sharp skin incision at the dorsal aspect of the dorsal left 3rd digit/ray/MTPJ with a scalpel blade. Deep dissection with a large tenotomy scissor. The PIPJ is entered with a scalpel blade.  The medial and lateral collateral ligaments were severed. The extensor tendon is removed from the dorsal surface of the head of the proximal interphalangeal joint and is retracted proximally.  The extensor chou/sling apparatus is also severed from its medial and lateral attachments to the base of proximal phalanx, and is retracted proximally.  Copious irrigation with sterile saline solution.  The head of the proximal phalanx is then transected with a sagittal saw.  The base of the middle phalanx was then transected in similar fashion.     A Kelikian push up test is then performed, and is determined that further corrective measures would be necessitated due to residual of contracture(s).     Following this, capsulotomy about the 3rd MTPJ with a scalpel blade. Capsulotomy of the medial, dorsal, lateral capsule as well as collateral ligaments for correction of digital contracture at the metatarsophalangeal joint. Copious lavage with sterile saline solution. EDL z-lengthening is performed. An EDB tenotomy is performed.  Following this a another Kelikian push up test is performed and not further contractures are noted.    At this point an arthrodesis of the PIPJ was performed with a 0.045/0.062 k-wire. The tendon is repaired across the proximal interphalangeal joint using 2-0 Vicryl suture.  The subcutaneous/subcuticular closure along the incision length with 3-0 Vicryl suture. The skin is closed using 3-0 Nylon.     All incisions are dressed with Xeroform/Adaptic nonadherent dressings followed by sterile 4 x 4 gauze, abdominal pad, Eliseo/Kerlix and light ACE. Tena Compression is applied w/ a posterior splint.     The anesthesia is weaned. There were no complications to this procedure. Any final necessary imaging to be performed in the PACU if it was not performed here in the OR suite.  The patient is transferred to the [facility]stretcher, [facility]. The patient is transferred to the PACU.    Significant Surgical Tasks Conducted by the Assistant(s), if Applicable: N/A    Complications: * No complications entered in OR log *    Estimated Blood Loss (EBL): Minimal    Drains: N/A    Implants:   Implant Name Type Inv. Item Serial No.  Lot No. LRB No. Used Action   [id number] Bone  [id number] N/A Left 1 Implanted   WIRE C TROCAR TIP .062 - SN/A  WIRE C TROCAR TIP .062 N/A [employer] [id number] Left 1 Implanted and Explanted   WIRE C TROCAR TIP .062 - SN/A  WIRE C TROCAR TIP .062 N/A [employer] [id number] Left 2 Implanted   STRATUM FOOT PLATING SYSTEM   N/A [employer] (TMG)  Left 1 Implanted   STRATUM FOOT PLATING SYSTEM   N/A [employer] (TMG)  Left 1 Implanted   STRATUM FOOT PLATING SYSTEM   N/A [employer] (TMG)  Left 2 Implanted   STRATUM FOOT PLATING SYSTEM   N/A [employer] (TMG)  Left 1 Implanted   2.0mm twist off 12mm      Left 1 Implanted   0.9 k-wire      Left 1 Implanted and Explanted       Specimens: * No specimens in log *    Condition: stable    Disposition: PACU - hemodynamically stable.    Attestation: I performed the procedure.

## 2020-09-19 ENCOUNTER — PATIENT MESSAGE (OUTPATIENT)
Dept: SLEEP MEDICINE | Facility: CLINIC | Age: 55
End: 2020-09-19

## 2020-09-21 VITALS
HEIGHT: 65 IN | TEMPERATURE: 99 F | OXYGEN SATURATION: 99 % | DIASTOLIC BLOOD PRESSURE: 75 MMHG | HEART RATE: 70 BPM | RESPIRATION RATE: 16 BRPM | SYSTOLIC BLOOD PRESSURE: 116 MMHG | WEIGHT: 226 LBS | BODY MASS INDEX: 37.65 KG/M2

## 2020-09-29 ENCOUNTER — PATIENT MESSAGE (OUTPATIENT)
Dept: SLEEP MEDICINE | Facility: CLINIC | Age: 55
End: 2020-09-29

## 2020-09-29 ENCOUNTER — OFFICE VISIT (OUTPATIENT)
Dept: SLEEP MEDICINE | Facility: CLINIC | Age: 55
End: 2020-09-29
Payer: COMMERCIAL

## 2020-09-29 ENCOUNTER — PATIENT OUTREACH (OUTPATIENT)
Dept: ADMINISTRATIVE | Facility: OTHER | Age: 55
End: 2020-09-29

## 2020-09-29 VITALS — HEIGHT: 65 IN | WEIGHT: 225 LBS | BODY MASS INDEX: 37.49 KG/M2

## 2020-09-29 DIAGNOSIS — E66.01 CLASS 2 SEVERE OBESITY WITH SERIOUS COMORBIDITY AND BODY MASS INDEX (BMI) OF 37.0 TO 37.9 IN ADULT, UNSPECIFIED OBESITY TYPE: ICD-10-CM

## 2020-09-29 DIAGNOSIS — G47.33 OSA (OBSTRUCTIVE SLEEP APNEA): Primary | ICD-10-CM

## 2020-09-29 PROCEDURE — 3008F BODY MASS INDEX DOCD: CPT | Mod: CPTII,,, | Performed by: NURSE PRACTITIONER

## 2020-09-29 PROCEDURE — 99213 OFFICE O/P EST LOW 20 MIN: CPT | Mod: 95,,, | Performed by: NURSE PRACTITIONER

## 2020-09-29 PROCEDURE — 99213 PR OFFICE/OUTPT VISIT, EST, LEVL III, 20-29 MIN: ICD-10-PCS | Mod: 95,,, | Performed by: NURSE PRACTITIONER

## 2020-09-29 PROCEDURE — 3008F PR BODY MASS INDEX (BMI) DOCUMENTED: ICD-10-PCS | Mod: CPTII,,, | Performed by: NURSE PRACTITIONER

## 2020-09-29 NOTE — PROGRESS NOTES
Health Maintenance Due   Topic Date Due    HIV Screening  06/28/1980    Shingles Vaccine (1 of 2) 06/28/2013    Influenza Vaccine (1) 08/01/2020     Updates were requested from care everywhere.  Chart was reviewed for overdue Proactive [facility] Encounters ([facility]) topics (CRS, Breast Cancer Screening, Eye exam)  Health Maintenance has been updated.  LINKS immunization registry triggered.  Immunizations were reconciled.

## 2020-09-29 NOTE — PROGRESS NOTES
"Subjective:      Patient ID: Taryn Serrano is a 55 y.o. female.    Chief Complaint: No chief complaint on file.  The patient location is: Home  The chief complaint leading to consultation is: sleep apnea  Visit type: Virtual visit with synchronous audio and video  Total time spent with patient: [id number]  Each patient to whom he or she provides medical services by telemedicine is:  (1) informed of the relationship between the physician and patient and the respective role of any other health care provider with respect to management of the patient; and (2) notified that he or she may decline to receive medical services by telemedicine and may withdraw from such care at any time.    HPI  Presents to office for review of AutoPAP therapy. Recent replacement. Patient states improved symptoms with use of AutoPAP. Sleeping more soundly. Waking up feeling more refreshed. Improved daytime sleepiness. Patient states she is benefiting from use of the AutoPAP. Weight unchanged    Patient Active Problem List   Diagnosis    Circadian rhythm sleep disorder, irregular sleep-wake type    Paving stone degeneration of peripheral retina    Periodic limb movement disorder    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    Asthma, exercise induced    URBAN (obstructive sleep apnea)    Acquired hypothyroidism    Anxiety and depression    Vitamin D deficiency    Pseudophakia of both eyes    Dry eye    PCO (posterior capsular opacification)    Obesity, Class II, BMI 35-39.9, no comorbidity    Synovial cyst of left popliteal space    Left ACL tear    Tear of medial meniscus of left knee    Other hyperlipidemia    CKD (chronic kidney disease) stage 3, GFR 30-59 ml/min    Osteopenia    Bunion, left foot       Ht 5' 5" (1.651 m)   Wt 102.1 kg (225 lb)   LMP 01/07/2015   BMI 37.44 kg/m²   Body mass index is 37.44 kg/m².    Review of Systems   Constitutional: Negative.    HENT: Negative.    Respiratory: " Negative.    Cardiovascular: Negative.    Musculoskeletal: Negative.    Gastrointestinal: Negative.    Neurological: Negative.    Psychiatric/Behavioral: Negative.      Objective:      Physical Exam  Constitutional:       Appearance: She is well-developed.   HENT:      Head: Normocephalic and atraumatic.   Neck:      Musculoskeletal: Normal range of motion.   Pulmonary:      Effort: Pulmonary effort is normal. No tachypnea, bradypnea, accessory muscle usage or respiratory distress.   Skin:     Findings: No rash.   Neurological:      Mental Status: She is alert and oriented to person, place, and time.   Psychiatric:         Behavior: Behavior normal.         Thought Content: Thought content normal.         Judgment: Judgment normal.       Personal Diagnostic Review  Compliance Summary  8/29/2020 - 9/27/2020 (30 days)  Days with Device Usage 30 days  Days without Device Usage 0 days  Percent Days with Device Usage 100.0%  Cumulative Usage 10 days 3 hrs. 46 mins. 32 secs.  Maximum Usage (1 Day) 13 hrs. 53 mins. 46 secs.  Average Usage (All Days) 8 hrs. 7 mins. 33 secs.  Average Usage (Days Used) 8 hrs. 7 mins. 33 secs.  Minimum Usage (1 Day) 6 hrs. 45 mins. 20 secs.  Percent of Days with Usage >= 4 Hours 100.0%  Percent of Days with Usage < 4 Hours 0.0%  Date Range  Total Blower Time 10 days 4 hrs. 50 mins. 40 secs.  Average AHI 6.0  Auto-CPAP Summary  Auto-CPAP Mean Pressure 9.5 cmH2O  Auto-CPAP Peak Average Pressure 11.3 cmH2O  Average Device Pressure <= 90% of Time 11.6 cmH2O  Average Time in Large Leak Per Day 20 mins. 46 secs.      Results for orders placed during the hospital encounter of 09/02/20   X-Ray Chest PA And Lateral    Narrative EXAMINATION:  XR CHEST PA AND LATERAL    CLINICAL HISTORY:  Encounter for other preprocedural examination    TECHNIQUE:  PA and lateral views of the chest were performed.    COMPARISON:  12/05/2017    FINDINGS:  Cardiac silhouette and mediastinal contours are stable.  Lungs are  clear.  Osseous structures are intact.      Impression No acute cardiopulmonary process.      Electronically signed by: Goldy Velarde MD  Date:    09/02/2020  Time:    13:50         Assessment:       1. URBAN (obstructive sleep apnea)    2. Class 2 severe obesity with serious comorbidity and body mass index (BMI) of 37.0 to 37.9 in adult, unspecified obesity type        Outpatient Encounter Medications as of 9/29/2020   Medication Sig Dispense Refill    albuterol (VENTOLIN HFA) 90 mcg/actuation inhaler Inhale 1-2 puffs into the lungs every 6 (six) hours as needed for Wheezing. RescueINHALE 1 TO 2 PUFFS BY MOUTH EVERY 6 HOURS AS NEEDED FOR WHEEZE (Patient taking differently: Inhale 1-2 puffs into the lungs every 6 (six) hours as needed for Wheezing (exercise induced). RescueINHALE 1 TO 2 PUFFS BY MOUTH EVERY 6 HOURS AS NEEDED FOR WHEEZE) 18 g 6    buPROPion (WELLBUTRIN XL) 300 MG 24 hr tablet TAKE 1 TABLET BY MOUTH EVERY DAY (Patient taking differently: Take 300 mg by mouth once daily. ) 90 tablet 3    cholecalciferol, vitamin D3, 2,000 unit Cap Take 1 capsule (2,000 Units total) by mouth once daily. (Patient taking differently: Take 1 capsule by mouth every other day. ) 100 capsule 6    esomeprazole (NEXIUM) 40 MG capsule TAKE 1 CAPSULE (40 MG TOTAL) BY MOUTH BEFORE BREAKFAST. DAILY PRN (Patient taking differently: Take 40 mg by mouth daily as needed. daily PRN) 30 capsule 6    FLUoxetine 40 MG capsule TAKE 2 CAPSULES (80 MG TOTAL) BY MOUTH ONCE DAILY. (Patient taking differently: Take 40 mg by mouth once daily. ) 180 capsule 2    fluticasone (FLONASE) 50 mcg/actuation nasal spray 2 sprays (100 mcg total) by Each Nare route once daily. (Patient taking differently: 2 sprays by Each Nostril route daily as needed. ) 1 Bottle 3    fluticasone-salmeterol diskus inhaler 500-50 mcg Inhale 1 puff into the lungs 2 (two) times daily. 60 each 6    gabapentin (NEURONTIN) 300 MG capsule Take 1 capsule (300 mg total) by  mouth 2 (two) times daily. 120 capsule 1    ibuprofen (ADVIL,MOTRIN) 200 MG tablet Take 200 mg by mouth every 6 (six) hours as needed for Pain.      levothyroxine (SYNTHROID) 112 MCG tablet TAKE 1 TABLET (112 MCG TOTAL) BY MOUTH ONCE DAILY. 90 tablet 3    meloxicam (MOBIC) 15 MG tablet TAKE ONE PILL DAILY AS NEEDED FOR PAIN (Patient taking differently: Take 15 mg by mouth daily as needed. Take one pill daily as needed for pain) 30 tablet 1    metronidazole 0.75% (METROCREAM) 0.75 % Crea Apply topically 2 (two) times daily. 45 g 2    turmeric root extract 500 mg Cap Take 1,000 capsules by mouth once daily. 100 capsule 6     Facility-Administered Encounter Medications as of 9/29/2020   Medication Dose Route Frequency Provider Last Rate Last Dose    ondansetron HCl (PF) 4 mg/2 mL injection    PRN Laci Smalls CRNA   4 mg at 07/07/15 0846     No orders of the defined types were placed in this encounter.    Plan:   Compliant with PAP and benefits from use. Follow up annually in the sleep clinic.  Weight loss and exercise to improve overall health.       Problem List Items Addressed This Visit        Other    URBAN (obstructive sleep apnea) - Primary      Other Visit Diagnoses     Class 2 severe obesity with serious comorbidity and body mass index (BMI) of 37.0 to 37.9 in adult, unspecified obesity type

## 2020-09-30 ENCOUNTER — OFFICE VISIT (OUTPATIENT)
Dept: PODIATRY | Facility: CLINIC | Age: 55
End: 2020-09-30
Payer: COMMERCIAL

## 2020-09-30 VITALS
HEART RATE: 73 BPM | HEIGHT: 65 IN | WEIGHT: 225.06 LBS | SYSTOLIC BLOOD PRESSURE: 114 MMHG | DIASTOLIC BLOOD PRESSURE: 73 MMHG | BODY MASS INDEX: 37.5 KG/M2

## 2020-09-30 DIAGNOSIS — M20.41 HAMMER TOES OF BOTH FEET: ICD-10-CM

## 2020-09-30 DIAGNOSIS — Z09 POSTOP CHECK: Primary | ICD-10-CM

## 2020-09-30 DIAGNOSIS — M24.572 CONTRACTURE, LEFT ANKLE: ICD-10-CM

## 2020-09-30 DIAGNOSIS — M20.12 HALLUX VALGUS (ACQUIRED), LEFT FOOT: ICD-10-CM

## 2020-09-30 DIAGNOSIS — M20.42 HAMMER TOES OF BOTH FEET: ICD-10-CM

## 2020-09-30 PROCEDURE — 99999 PR PBB SHADOW E&M-EST. PATIENT-LVL III: CPT | Mod: PBBFAC,,, | Performed by: PODIATRIST

## 2020-09-30 PROCEDURE — 29515 APPLICATION SHORT LEG SPLINT: CPT | Mod: 58,LT,S$GLB, | Performed by: PODIATRIST

## 2020-09-30 PROCEDURE — 99024 PR POST-OP FOLLOW-UP VISIT: ICD-10-PCS | Mod: S$GLB,,, | Performed by: PODIATRIST

## 2020-09-30 PROCEDURE — 99999 PR PBB SHADOW E&M-EST. PATIENT-LVL III: ICD-10-PCS | Mod: PBBFAC,,, | Performed by: PODIATRIST

## 2020-09-30 PROCEDURE — 99024 POSTOP FOLLOW-UP VISIT: CPT | Mod: S$GLB,,, | Performed by: PODIATRIST

## 2020-09-30 PROCEDURE — 29515 PR APPLY LOWER LEG SPLINT: ICD-10-PCS | Mod: 58,LT,S$GLB, | Performed by: PODIATRIST

## 2020-09-30 NOTE — PROGRESS NOTES
Subjective:       Patient ID: Taryn Serrano is a 55 y.o. female.    Chief Complaint: Post-op Evaluation (DOS09/18/2020 Bunionectomy Lapidus L. foot 0/10 soft cast w/wrapping non diabetic pt pcp Dr. Schroeder.)      HPI:  Taryn Serrano presents to the office today, s/p 9/18/2020 left Lapidus with 2nd and 3rd hammer toe repair with 2nd metatarsal osteotomy and GERRY. She does continue ASA 325mg QD for DVT. States RICE therapy as needed. States no cough or dyspnea. States no calf pains. Does continue oral Vit. D. NWB with posterior splint and knee scooter.    Hemoglobin A1C   Date Value Ref Range Status   02/18/2020 5.0 4.0 - 5.6 % Final     Comment:     ADA Screening Guidelines:  5.7-6.4%  Consistent with prediabetes  >or=6.5%  Consistent with diabetes  High levels of fetal hemoglobin interfere with the HbA1C  assay. Heterozygous hemoglobin variants (HbS, HgC, etc)do  not significantly interfere with this assay.   However, presence of multiple variants may affect accuracy.     02/09/2018 4.9 4.0 - 5.6 % Final     Comment:     According to ADA guidelines, hemoglobin A1c <7.0% represents  optimal control in non-pregnant diabetic patients. Different  metrics may apply to specific patient populations.   Standards of Medical Care in Diabetes-2016.  For the purpose of screening for the presence of diabetes:  <5.7%     Consistent with the absence of diabetes  5.7-6.4%  Consistent with increasing risk for diabetes   (prediabetes)  >or=6.5%  Consistent with diabetes  Currently, no consensus exists for use of hemoglobin A1c  for diagnosis of diabetes for children.  This Hemoglobin A1c assay has significant interference with fetal   hemoglobin   (HbF). The results are invalid for patients with abnormal amounts of   HbF,   including those with known Hereditary Persistence   of Fetal Hemoglobin. Heterozygous hemoglobin variants (HbAS, HbAC,   HbAD, HbAE, HbA2) do not significantly interfere with this assay;   however,  presence of multiple variants in a sample may impact the %   interference.     01/12/2015 5.3 4.5 - 6.2 % Final       Review of patient's allergies indicates:   Allergen Reactions    Grass pollen-perennial rye, standard Shortness Of Breath    Horsetail (equisetum arvense) Shortness Of Breath    Mold extracts Shortness Of Breath       Past Medical History:   Diagnosis Date    Anxiety and depression     Asthma, exercise induced     Dry eyes     History of ADHD     Hypothyroid     URBAN (obstructive sleep apnea)     CPAP    Vitamin D deficiency        Family History   Problem Relation Age of Onset    Cataracts Father     Alzheimer's disease Father     Colon cancer Father     Heart failure Mother     Melanoma Neg Hx     Psoriasis Neg Hx     Lupus Neg Hx     Eczema Neg Hx     Acne Neg Hx     Breast cancer Neg Hx     Ovarian cancer Neg Hx     Thrombophilia Neg Hx        Social History     Socioeconomic History    Marital status:      Spouse name: Not on file    Number of children: 2    Years of education: Not on file    Highest education level: Not on file   Occupational History     Employer: [employer]   Social Needs    Financial resource strain: Not on file    Food insecurity     Worry: Not on file     Inability: Not on file    Transportation needs     Medical: Not on file     Non-medical: Not on file   Tobacco Use    Smoking status: Never Smoker    Smokeless tobacco: Never Used   Substance and Sexual Activity    Alcohol use: Yes     Comment: socially  No alcohol prior to surgery    Drug use: No    Sexual activity: Yes   Lifestyle    Physical activity     Days per week: Not on file     Minutes per session: Not on file    Stress: Not at all   Relationships    Social connections     Talks on phone: Not on file     Gets together: Not on file     Attends [employer] service: Not on file     Active member of club or organization: Not on file     Attends meetings of clubs or  "organizations: Not on file     Relationship status: Not on file   Other Topics Concern    Are you pregnant or think you may be? No    Breast-feeding No   Social History Narrative    Full time emplyed.       Past Surgical History:   Procedure Laterality Date    ANKLE SURGERY Right     BREAST BIOPSY Right     benign    CARPAL TUNNEL RELEASE Bilateral     CATARACT EXTRACTION BILATERAL W/ ANTERIOR VITRECTOMY      CATARACT EXTRACTION W/  INTRAOCULAR LENS IMPLANT  OU     SECTION      KNEE ARTHROSCOPY Left     KNEE SURGERY      right    LAPIDUS BUNIONECTOMY Left 2020    Procedure: BUNIONECTOMY, LAPIDUS;  Surgeon: Krzysztof Simmons DPM;  Location: [facility] OR;  Service: Podiatry;  Laterality: Left;    LENGTHENING OF ACHILLES TENDON Left 2020    Procedure: LENGTHENING, TENDON, ACHILLES;  Surgeon: Krzysztof Simmons DPM;  Location: [facility] OR;  Service: Podiatry;  Laterality: Left;    OSTEOTOMY OF METATARSAL BONE Left 2020    Procedure: OSTEOTOMY, METATARSAL BONE;  Surgeon: Krzysztof Simmons DPM;  Location: [facility] OR;  Service: Podiatry;  Laterality: Left;  2ND TOE    WISDOM TOOTH EXTRACTION         Review of Systems   Constitutional: Negative for chills, fatigue and fever.   HENT: Negative for hearing loss.    Eyes: Negative for photophobia and visual disturbance.   Respiratory: Negative for cough, chest tightness, shortness of breath and wheezing.    Cardiovascular: Negative for chest pain and palpitations.   Gastrointestinal: Negative for constipation, diarrhea, nausea and vomiting.   Endocrine: Negative for cold intolerance and heat intolerance.   Genitourinary: Negative for flank pain.   Musculoskeletal: Positive for gait problem. Negative for neck pain and neck stiffness.   Skin: Negative for wound.   Neurological: Negative for light-headedness and headaches.   Psychiatric/Behavioral: Negative for sleep disturbance.          Objective:   /73   Pulse 73   Ht 5' 5" (1.651 m)   Wt " "102.1 kg (225 lb 1.4 oz)   LMP 01/07/2015   BMI 37.46 kg/m²       Physical Exam  LOWER EXTREMITY PHYSICAL EXAMINATION    VASCULAR: No ipsilateral calf pain or tenderness is noted with palpation and compression. No palpable cords noted.    DERMATOLOGY: No evidence of wound healing complications. k-wire insertions sites are w/o pathology.     ORTHOPEDIC: Anatomic alignment is noted to the 1st-3rd toe. Passive and active ROM of the Achilles tendon is noted.         Assessment:     1. Postop check    2. Hallux valgus (acquired), left foot    3. Contracture, left ankle    4. Hammer toes of both feet          Plan:     Postop check    Hallux valgus (acquired), left foot    Contracture, left ankle    Hammer toes of both feet      Thorough discussion is had with the patient today, concerning the diagnosis, its etiology, and the treatment algorithm at present. XRAYS are reviewed in detail with the patient. All questions and concerns regarding findings and its/their implications are outlined and discussed. LLE short leg posterior splint application in standard fashion using 5" OrthoGlass fiberglass splinting material, approx. 24" with web-roll/cast padding and ACE bandage. Continue ASA for DVT PPx. Cont. Vit D supplementation. F/U in 2 weeks.          Future Appointments   Date Time Provider Department Center   10/13/2020 10:30 AM Krzysztof Simmons DPM [facility]   2/11/2021  8:10 AM [facility]   2/18/2021  9:20 AM [facility]       "

## 2020-10-13 ENCOUNTER — OFFICE VISIT (OUTPATIENT)
Dept: PODIATRY | Facility: CLINIC | Age: 55
End: 2020-10-13
Payer: COMMERCIAL

## 2020-10-13 VITALS
HEIGHT: 65 IN | DIASTOLIC BLOOD PRESSURE: 72 MMHG | HEART RATE: 80 BPM | SYSTOLIC BLOOD PRESSURE: 106 MMHG | BODY MASS INDEX: 37.5 KG/M2 | WEIGHT: 225.06 LBS

## 2020-10-13 DIAGNOSIS — M24.572 CONTRACTURE, LEFT ANKLE: ICD-10-CM

## 2020-10-13 DIAGNOSIS — M77.42 METATARSALGIA OF BOTH FEET: ICD-10-CM

## 2020-10-13 DIAGNOSIS — M20.41 HAMMER TOES OF BOTH FEET: ICD-10-CM

## 2020-10-13 DIAGNOSIS — Z09 POSTOP CHECK: Primary | ICD-10-CM

## 2020-10-13 DIAGNOSIS — E55.9 VITAMIN D DEFICIENCY: ICD-10-CM

## 2020-10-13 DIAGNOSIS — M77.41 METATARSALGIA OF BOTH FEET: ICD-10-CM

## 2020-10-13 DIAGNOSIS — M20.42 HAMMER TOES OF BOTH FEET: ICD-10-CM

## 2020-10-13 DIAGNOSIS — M20.12 HALLUX VALGUS (ACQUIRED), LEFT FOOT: ICD-10-CM

## 2020-10-13 PROCEDURE — 99024 PR POST-OP FOLLOW-UP VISIT: ICD-10-PCS | Mod: S$GLB,,, | Performed by: PODIATRIST

## 2020-10-13 PROCEDURE — 99999 PR PBB SHADOW E&M-EST. PATIENT-LVL IV: ICD-10-PCS | Mod: PBBFAC,,, | Performed by: PODIATRIST

## 2020-10-13 PROCEDURE — 99024 POSTOP FOLLOW-UP VISIT: CPT | Mod: S$GLB,,, | Performed by: PODIATRIST

## 2020-10-13 PROCEDURE — 99999 PR PBB SHADOW E&M-EST. PATIENT-LVL IV: CPT | Mod: PBBFAC,,, | Performed by: PODIATRIST

## 2020-10-13 NOTE — PROGRESS NOTES
Subjective:       Patient ID: Taryn Serrano is a 55 y.o. female.    Chief Complaint: Post-op Evaluation (DOS 09/18/2020 BunionectomyLipidus l. foot 0/10 soft cast w/wrappping non diabetic pt pcp Dr. Schroeder)      HPI:  Taryn Serrano presents to the office today, s/p 9/18/2020 left Lapidus with 2nd and 3rd hammer toe repair with 2nd metatarsal osteotomy and GERRY. She does continue ASA 325mg QD for DVT. States RICE therapy. States no cough or dyspnea. States no calf pains. Does continue oral Vit. D. NWB with posterior splint and knee scooter.    Hemoglobin A1C   Date Value Ref Range Status   02/18/2020 5.0 4.0 - 5.6 % Final     Comment:     ADA Screening Guidelines:  5.7-6.4%  Consistent with prediabetes  >or=6.5%  Consistent with diabetes  High levels of fetal hemoglobin interfere with the HbA1C  assay. Heterozygous hemoglobin variants (HbS, HgC, etc)do  not significantly interfere with this assay.   However, presence of multiple variants may affect accuracy.     02/09/2018 4.9 4.0 - 5.6 % Final     Comment:     According to ADA guidelines, hemoglobin A1c <7.0% represents  optimal control in non-pregnant diabetic patients. Different  metrics may apply to specific patient populations.   Standards of Medical Care in Diabetes-2016.  For the purpose of screening for the presence of diabetes:  <5.7%     Consistent with the absence of diabetes  5.7-6.4%  Consistent with increasing risk for diabetes   (prediabetes)  >or=6.5%  Consistent with diabetes  Currently, no consensus exists for use of hemoglobin A1c  for diagnosis of diabetes for children.  This Hemoglobin A1c assay has significant interference with fetal   hemoglobin   (HbF). The results are invalid for patients with abnormal amounts of   HbF,   including those with known Hereditary Persistence   of Fetal Hemoglobin. Heterozygous hemoglobin variants (HbAS, HbAC,   HbAD, HbAE, HbA2) do not significantly interfere with this assay;   however, presence of  multiple variants in a sample may impact the %   interference.     01/12/2015 5.3 4.5 - 6.2 % Final       Review of patient's allergies indicates:   Allergen Reactions    Grass pollen-perennial rye, standard Shortness Of Breath    Horsetail (equisetum arvense) Shortness Of Breath    Mold extracts Shortness Of Breath       Past Medical History:   Diagnosis Date    Anxiety and depression     Asthma, exercise induced     Dry eyes     History of ADHD     Hypothyroid     URBAN (obstructive sleep apnea)     CPAP    Vitamin D deficiency        Family History   Problem Relation Age of Onset    Cataracts Father     Alzheimer's disease Father     Colon cancer Father     Heart failure Mother     Melanoma Neg Hx     Psoriasis Neg Hx     Lupus Neg Hx     Eczema Neg Hx     Acne Neg Hx     Breast cancer Neg Hx     Ovarian cancer Neg Hx     Thrombophilia Neg Hx        Social History     Socioeconomic History    Marital status:      Spouse name: Not on file    Number of children: 2    Years of education: Not on file    Highest education level: Not on file   Occupational History     Employer: [employer]   Social Needs    Financial resource strain: Not on file    Food insecurity     Worry: Not on file     Inability: Not on file    Transportation needs     Medical: Not on file     Non-medical: Not on file   Tobacco Use    Smoking status: Never Smoker    Smokeless tobacco: Never Used   Substance and Sexual Activity    Alcohol use: Yes     Comment: socially  No alcohol prior to surgery    Drug use: No    Sexual activity: Yes   Lifestyle    Physical activity     Days per week: Not on file     Minutes per session: Not on file    Stress: Not at all   Relationships    Social connections     Talks on phone: Not on file     Gets together: Not on file     Attends [employer] service: Not on file     Active member of club or organization: Not on file     Attends meetings of clubs or organizations:  "Not on file     Relationship status: Not on file   Other Topics Concern    Are you pregnant or think you may be? No    Breast-feeding No   Social History Narrative    Full time emplyed.       Past Surgical History:   Procedure Laterality Date    ANKLE SURGERY Right     BREAST BIOPSY Right     benign    CARPAL TUNNEL RELEASE Bilateral     CATARACT EXTRACTION BILATERAL W/ ANTERIOR VITRECTOMY      CATARACT EXTRACTION W/  INTRAOCULAR LENS IMPLANT  OU     SECTION      KNEE ARTHROSCOPY Left     KNEE SURGERY      right    LAPIDUS BUNIONECTOMY Left 2020    Procedure: BUNIONECTOMY, LAPIDUS;  Surgeon: Krzysztof Simmons DPM;  Location: [facility] OR;  Service: Podiatry;  Laterality: Left;    LENGTHENING OF ACHILLES TENDON Left 2020    Procedure: LENGTHENING, TENDON, ACHILLES;  Surgeon: Krzysztof Simmons DPM;  Location: [facility] OR;  Service: Podiatry;  Laterality: Left;    OSTEOTOMY OF METATARSAL BONE Left 2020    Procedure: OSTEOTOMY, METATARSAL BONE;  Surgeon: Krzysztof Simmons DPM;  Location: [facility] OR;  Service: Podiatry;  Laterality: Left;  2ND TOE    WISDOM TOOTH EXTRACTION         Review of Systems   Constitutional: Negative for chills, fatigue and fever.   HENT: Negative for hearing loss.    Eyes: Negative for photophobia and visual disturbance.   Respiratory: Negative for cough, chest tightness, shortness of breath and wheezing.    Cardiovascular: Negative for chest pain and palpitations.   Gastrointestinal: Negative for constipation, diarrhea, nausea and vomiting.   Endocrine: Negative for cold intolerance and heat intolerance.   Genitourinary: Negative for flank pain.   Musculoskeletal: Positive for gait problem. Negative for neck pain and neck stiffness.   Skin: Negative for wound.   Neurological: Negative for light-headedness and headaches.   Psychiatric/Behavioral: Negative for sleep disturbance.          Objective:   /72   Pulse 80   Ht 5' 5" (1.651 m)   Wt 102.1 kg (225 lb " 1.4 oz)   LMP 01/07/2015   BMI 37.46 kg/m²       Physical Exam  LOWER EXTREMITY PHYSICAL EXAMINATION  DERMATOLOGY: No evidence of wound healing complications, s/p removal of sutures. k-wire insertions sites are w/o pathology.     VASCULAR: No ipsilateral calf pain or tenderness is noted with palpation and compression. No palpable cords noted.     ORTHOPEDIC: Anatomic alignment is noted to the 1st-3rd toe. Passive and active ROM of the Achilles tendon is noted. Mild medial 1st MTPJ edema is noted.    Assessment:     1. Postop check    2. Hallux valgus (acquired), left foot    3. Contracture, left ankle    4. Hammer toes of both feet    5. Metatarsalgia of both feet    6. Vitamin D deficiency          Plan:     Postop check  -     X-Ray Foot Complete Left; Future; Expected date: 10/13/2020    Hallux valgus (acquired), left foot    Contracture, left ankle    Hammer toes of both feet    Metatarsalgia of both feet    Vitamin D deficiency      Thorough discussion is had with the patient today, concerning the diagnosis, its etiology, and the treatment algorithm at present. D/C posterior splint and transition to CAM Walker with 90% weight on the heel bone with DME device to B/L UE or RUE. RICE therapy ATC. No showering the area. K-wires to be removed upon follow up. Continue ASA for DVT PPx. Cont. Vit. D supplementation.             Future Appointments   Date Time Provider Department Center   10/27/2020  9:30 AM [facility]   10/27/2020  9:45 AM Krzysztof Simmons DPM [facility]   2/11/2021  8:10 AM [facility] [address]   2/18/2021  9:20 AM Alyce Schroeder MD [facility]   10/5/2021  8:40 AM Elizabeth Lejeune, NP [facility]

## 2020-10-22 RX ORDER — LEVOTHYROXINE SODIUM 112 UG/1
112 TABLET ORAL DAILY
Qty: 90 TABLET | Refills: 3 | Status: SHIPPED | OUTPATIENT
Start: 2020-10-22 | End: 2021-10-16 | Stop reason: SDUPTHER

## 2020-10-27 ENCOUNTER — OFFICE VISIT (OUTPATIENT)
Dept: PODIATRY | Facility: CLINIC | Age: 55
End: 2020-10-27
Payer: COMMERCIAL

## 2020-10-27 ENCOUNTER — HOSPITAL ENCOUNTER (OUTPATIENT)
Dept: RADIOLOGY | Facility: HOSPITAL | Age: 55
Discharge: HOME | End: 2020-10-27
Attending: PODIATRIST
Payer: COMMERCIAL

## 2020-10-27 VITALS
HEART RATE: 81 BPM | HEIGHT: 65 IN | DIASTOLIC BLOOD PRESSURE: 66 MMHG | SYSTOLIC BLOOD PRESSURE: 99 MMHG | BODY MASS INDEX: 37.56 KG/M2 | WEIGHT: 225.44 LBS

## 2020-10-27 DIAGNOSIS — M20.42 HAMMER TOES OF BOTH FEET: ICD-10-CM

## 2020-10-27 DIAGNOSIS — Z09 POSTOP CHECK: ICD-10-CM

## 2020-10-27 DIAGNOSIS — M24.572 CONTRACTURE, LEFT ANKLE: ICD-10-CM

## 2020-10-27 DIAGNOSIS — M20.41 HAMMER TOES OF BOTH FEET: ICD-10-CM

## 2020-10-27 DIAGNOSIS — M77.41 METATARSALGIA OF BOTH FEET: ICD-10-CM

## 2020-10-27 DIAGNOSIS — M77.42 METATARSALGIA OF BOTH FEET: ICD-10-CM

## 2020-10-27 DIAGNOSIS — M20.12 HALLUX VALGUS (ACQUIRED), LEFT FOOT: ICD-10-CM

## 2020-10-27 DIAGNOSIS — Z09 POSTOP CHECK: Primary | ICD-10-CM

## 2020-10-27 PROCEDURE — 73630 X-RAY EXAM OF FOOT: CPT | Mod: 26,LT,, | Performed by: RADIOLOGY

## 2020-10-27 PROCEDURE — 73630 X-RAY EXAM OF FOOT: CPT | Mod: TC,LT

## 2020-10-27 PROCEDURE — 99999 PR PBB SHADOW E&M-EST. PATIENT-LVL IV: ICD-10-PCS | Mod: PBBFAC,,, | Performed by: PODIATRIST

## 2020-10-27 PROCEDURE — 99024 PR POST-OP FOLLOW-UP VISIT: ICD-10-PCS | Mod: S$GLB,,, | Performed by: PODIATRIST

## 2020-10-27 PROCEDURE — 73630 XR FOOT COMPLETE 3 VIEW LEFT: ICD-10-PCS | Mod: 26,LT,, | Performed by: RADIOLOGY

## 2020-10-27 PROCEDURE — 99999 PR PBB SHADOW E&M-EST. PATIENT-LVL IV: CPT | Mod: PBBFAC,,, | Performed by: PODIATRIST

## 2020-10-27 PROCEDURE — 99024 POSTOP FOLLOW-UP VISIT: CPT | Mod: S$GLB,,, | Performed by: PODIATRIST

## 2020-10-27 NOTE — PROGRESS NOTES
Subjective:       Patient ID: Taryn Serrano is a 55 y.o. female.    Chief Complaint: Post-op Evaluation (Dos 09/18/2020 Bunionectomy Lapidus l foot 0/10 walking boot w/wrapping non diabetic pt pcp Dr. Schroeder.)      HPI:  Taryn Serrano presents to the office today, s/p 9/18/2020 left Lapidus with 2nd and 3rd hammer toe repair with 2nd metatarsal osteotomy and GERRY. States RICE therapy as needed. Does continue weight-bearing as tolerated with a walking boot and a cane with heel touch.    Hemoglobin A1C   Date Value Ref Range Status   02/18/2020 5.0 4.0 - 5.6 % Final     Comment:     ADA Screening Guidelines:  5.7-6.4%  Consistent with prediabetes  >or=6.5%  Consistent with diabetes  High levels of fetal hemoglobin interfere with the HbA1C  assay. Heterozygous hemoglobin variants (HbS, HgC, etc)do  not significantly interfere with this assay.   However, presence of multiple variants may affect accuracy.     02/09/2018 4.9 4.0 - 5.6 % Final     Comment:     According to ADA guidelines, hemoglobin A1c <7.0% represents  optimal control in non-pregnant diabetic patients. Different  metrics may apply to specific patient populations.   Standards of Medical Care in Diabetes-2016.  For the purpose of screening for the presence of diabetes:  <5.7%     Consistent with the absence of diabetes  5.7-6.4%  Consistent with increasing risk for diabetes   (prediabetes)  >or=6.5%  Consistent with diabetes  Currently, no consensus exists for use of hemoglobin A1c  for diagnosis of diabetes for children.  This Hemoglobin A1c assay has significant interference with fetal   hemoglobin   (HbF). The results are invalid for patients with abnormal amounts of   HbF,   including those with known Hereditary Persistence   of Fetal Hemoglobin. Heterozygous hemoglobin variants (HbAS, HbAC,   HbAD, HbAE, HbA2) do not significantly interfere with this assay;   however, presence of multiple variants in a sample may impact the %    interference.     01/12/2015 5.3 4.5 - 6.2 % Final       Review of patient's allergies indicates:   Allergen Reactions    Grass pollen-perennial rye, standard Shortness Of Breath    Horsetail (equisetum arvense) Shortness Of Breath    Mold extracts Shortness Of Breath       Past Medical History:   Diagnosis Date    Anxiety and depression     Asthma, exercise induced     Dry eyes     History of ADHD     Hypothyroid     URBAN (obstructive sleep apnea)     CPAP    Vitamin D deficiency        Family History   Problem Relation Age of Onset    Cataracts Father     Alzheimer's disease Father     Colon cancer Father     Heart failure Mother     Melanoma Neg Hx     Psoriasis Neg Hx     Lupus Neg Hx     Eczema Neg Hx     Acne Neg Hx     Breast cancer Neg Hx     Ovarian cancer Neg Hx     Thrombophilia Neg Hx        Social History     Socioeconomic History    Marital status:      Spouse name: Not on file    Number of children: 2    Years of education: Not on file    Highest education level: Not on file   Occupational History     Employer: [employer]   Social Needs    Financial resource strain: Not on file    Food insecurity     Worry: Not on file     Inability: Not on file    Transportation needs     Medical: Not on file     Non-medical: Not on file   Tobacco Use    Smoking status: Never Smoker    Smokeless tobacco: Never Used   Substance and Sexual Activity    Alcohol use: Yes     Comment: socially  No alcohol prior to surgery    Drug use: No    Sexual activity: Yes   Lifestyle    Physical activity     Days per week: Not on file     Minutes per session: Not on file    Stress: Not at all   Relationships    Social connections     Talks on phone: Not on file     Gets together: Not on file     Attends [employer] service: Not on file     Active member of club or organization: Not on file     Attends meetings of clubs or organizations: Not on file     Relationship status: Not on  "file   Other Topics Concern    Are you pregnant or think you may be? No    Breast-feeding No   Social History Narrative    Full time emplyed.       Past Surgical History:   Procedure Laterality Date    ANKLE SURGERY Right     BREAST BIOPSY Right     benign    CARPAL TUNNEL RELEASE Bilateral     CATARACT EXTRACTION BILATERAL W/ ANTERIOR VITRECTOMY      CATARACT EXTRACTION W/  INTRAOCULAR LENS IMPLANT  OU     SECTION      KNEE ARTHROSCOPY Left     KNEE SURGERY      right    LAPIDUS BUNIONECTOMY Left 2020    Procedure: BUNIONECTOMY, LAPIDUS;  Surgeon: Krzysztof Simmons DPM;  Location: [facility] OR;  Service: Podiatry;  Laterality: Left;    LENGTHENING OF ACHILLES TENDON Left 2020    Procedure: LENGTHENING, TENDON, ACHILLES;  Surgeon: Krzysztof Simmons DPM;  Location: [facility] OR;  Service: Podiatry;  Laterality: Left;    OSTEOTOMY OF METATARSAL BONE Left 2020    Procedure: OSTEOTOMY, METATARSAL BONE;  Surgeon: Krzysztof Simmons DPM;  Location: [facility] OR;  Service: Podiatry;  Laterality: Left;  2ND TOE    WISDOM TOOTH EXTRACTION         Review of Systems   Constitutional: Negative for chills, fatigue and fever.   HENT: Negative for hearing loss.    Eyes: Negative for photophobia and visual disturbance.   Respiratory: Negative for cough, chest tightness, shortness of breath and wheezing.    Cardiovascular: Negative for chest pain and palpitations.   Gastrointestinal: Negative for constipation, diarrhea, nausea and vomiting.   Endocrine: Negative for cold intolerance and heat intolerance.   Genitourinary: Negative for flank pain.   Musculoskeletal: Positive for gait problem. Negative for neck pain and neck stiffness.   Skin: Negative for wound.   Neurological: Negative for light-headedness and headaches.   Psychiatric/Behavioral: Negative for sleep disturbance.          Objective:   BP 99/66   Pulse 81   Ht 5' 5" (1.651 m)   Wt 102.3 kg (225 lb 6.7 oz)   LMP 2015   BMI 37.51 kg/m² "       Physical Exam  LOWER EXTREMITY PHYSICAL EXAMINATION  DERMATOLOGY: No evidence of wound healing complications.  No K-wire insertion site pathology.  No its lateral calf tenderness.  No palpable cords.  No calf swelling.    ORTHOPEDIC: Anatomic alignment is noted to the 1st-3rd toe. Passive and active ROM of the Achilles tendon is noted. Mild medial 1st MTPJ edema is noted.  No hallux varus is noted.    Assessment:     1. Postop check    2. Hallux valgus (acquired), left foot    3. Contracture, left ankle    4. Hammer toes of both feet    5. Metatarsalgia of both feet          Plan:     Postop check  -     X-Ray Foot Complete Left; Future; Expected date: 10/27/2020    Hallux valgus (acquired), left foot    Contracture, left ankle    Hammer toes of both feet    Metatarsalgia of both feet      Thorough discussion is had with the patient today, concerning the diagnosis, its etiology, and the treatment algorithm at present.  XRAYS are reviewed in detail with the patient. All questions and concerns regarding findings and its/their implications are outlined and discussed.  K-wires were removed without incident.  Continue vitamin-D.  The great toe was splinted into valgus due to slight medial subluxation noted on plain film.  On plain film however, there does appear to be a more prominent piece of metatarsal head which is missing/was resected dorsally.  The plantar cortex and central portion, on the lateral projection and the AP projection, does appear to be intact, thus likely limiting hallux varus.  And any respect, please splint the toe into valgus [facility].  Continue ambulation with heel touch with walking boot.            Future Appointments   Date Time Provider Department Center   11/10/2020  9:00 AM Krzysztof Simmons DPM [facility]   2/11/2021  8:10 AM [facility]   2/18/2021  9:20 AM [facility]   10/5/2021  8:40 AM Elizabeth Lejeune, NP [facility]

## 2020-10-28 RX ORDER — ESOMEPRAZOLE MAGNESIUM 40 MG/1
40 CAPSULE, DELAYED RELEASE ORAL
Qty: 30 CAPSULE | Refills: 6 | Status: SHIPPED | OUTPATIENT
Start: 2020-10-28 | End: 2021-06-10

## 2020-10-30 ENCOUNTER — PATIENT MESSAGE (OUTPATIENT)
Dept: PODIATRY | Facility: CLINIC | Age: 55
End: 2020-10-30

## 2020-11-10 ENCOUNTER — HOSPITAL ENCOUNTER (OUTPATIENT)
Dept: RADIOLOGY | Facility: HOSPITAL | Age: 55
Discharge: HOME | End: 2020-11-10
Attending: PODIATRIST
Payer: COMMERCIAL

## 2020-11-10 ENCOUNTER — OFFICE VISIT (OUTPATIENT)
Dept: PODIATRY | Facility: CLINIC | Age: 55
End: 2020-11-10
Payer: COMMERCIAL

## 2020-11-10 VITALS
SYSTOLIC BLOOD PRESSURE: 103 MMHG | BODY MASS INDEX: 37.57 KG/M2 | HEIGHT: 65 IN | WEIGHT: 225.5 LBS | DIASTOLIC BLOOD PRESSURE: 68 MMHG | HEART RATE: 80 BPM

## 2020-11-10 DIAGNOSIS — M20.42 HAMMER TOES OF BOTH FEET: ICD-10-CM

## 2020-11-10 DIAGNOSIS — Z09 POSTOP CHECK: Primary | ICD-10-CM

## 2020-11-10 DIAGNOSIS — E55.9 VITAMIN D DEFICIENCY: ICD-10-CM

## 2020-11-10 DIAGNOSIS — Z09 POSTOP CHECK: ICD-10-CM

## 2020-11-10 DIAGNOSIS — M20.12 HALLUX VALGUS (ACQUIRED), LEFT FOOT: ICD-10-CM

## 2020-11-10 DIAGNOSIS — M20.41 HAMMER TOES OF BOTH FEET: ICD-10-CM

## 2020-11-10 DIAGNOSIS — M24.572 CONTRACTURE, LEFT ANKLE: ICD-10-CM

## 2020-11-10 PROCEDURE — 99999 PR PBB SHADOW E&M-EST. PATIENT-LVL IV: CPT | Mod: PBBFAC,,, | Performed by: PODIATRIST

## 2020-11-10 PROCEDURE — 73630 X-RAY EXAM OF FOOT: CPT | Mod: TC,LT

## 2020-11-10 PROCEDURE — 73630 XR FOOT COMPLETE 3 VIEW LEFT: ICD-10-PCS | Mod: 26,LT,, | Performed by: RADIOLOGY

## 2020-11-10 PROCEDURE — 99024 POSTOP FOLLOW-UP VISIT: CPT | Mod: S$GLB,,, | Performed by: PODIATRIST

## 2020-11-10 PROCEDURE — 73630 X-RAY EXAM OF FOOT: CPT | Mod: 26,LT,, | Performed by: RADIOLOGY

## 2020-11-10 PROCEDURE — 99024 PR POST-OP FOLLOW-UP VISIT: ICD-10-PCS | Mod: S$GLB,,, | Performed by: PODIATRIST

## 2020-11-10 PROCEDURE — 99999 PR PBB SHADOW E&M-EST. PATIENT-LVL IV: ICD-10-PCS | Mod: PBBFAC,,, | Performed by: PODIATRIST

## 2020-11-10 RX ORDER — ERGOCALCIFEROL 1.25 MG/1
50000 CAPSULE ORAL
Qty: 12 CAPSULE | Refills: 0 | Status: SHIPPED | OUTPATIENT
Start: 2020-11-10 | End: 2021-01-29

## 2020-11-10 NOTE — PROGRESS NOTES
Subjective:       Patient ID: Taryn Serrano is a 55 y.o. female.    Chief Complaint: Post-op Evaluation (DOS09/18/2020 Bunionectomy Lapidus L. Toe. 0/10 walking boot non diabetic pt pcp Dr. Schroeder.)      HPI:  Taryn Serrano presents to the office today, s/p 9/18/2020 left Lapidus with 2nd and 3rd hammer toe repair with 2nd metatarsal osteotomy and GERRY. States RICE therapy as needed. Does continue weight-bearing as tolerated with a walking boot and a cane with heel touch. Did have xray evaluation this AM. States no hallux varus. States moderate pains at the plantar 1st TMTJ. States pedal edema.     Hemoglobin A1C   Date Value Ref Range Status   02/18/2020 5.0 4.0 - 5.6 % Final     Comment:     ADA Screening Guidelines:  5.7-6.4%  Consistent with prediabetes  >or=6.5%  Consistent with diabetes  High levels of fetal hemoglobin interfere with the HbA1C  assay. Heterozygous hemoglobin variants (HbS, HgC, etc)do  not significantly interfere with this assay.   However, presence of multiple variants may affect accuracy.     02/09/2018 4.9 4.0 - 5.6 % Final     Comment:     According to ADA guidelines, hemoglobin A1c <7.0% represents  optimal control in non-pregnant diabetic patients. Different  metrics may apply to specific patient populations.   Standards of Medical Care in Diabetes-2016.  For the purpose of screening for the presence of diabetes:  <5.7%     Consistent with the absence of diabetes  5.7-6.4%  Consistent with increasing risk for diabetes   (prediabetes)  >or=6.5%  Consistent with diabetes  Currently, no consensus exists for use of hemoglobin A1c  for diagnosis of diabetes for children.  This Hemoglobin A1c assay has significant interference with fetal   hemoglobin   (HbF). The results are invalid for patients with abnormal amounts of   HbF,   including those with known Hereditary Persistence   of Fetal Hemoglobin. Heterozygous hemoglobin variants (HbAS, HbAC,   HbAD, HbAE, HbA2) do not  significantly interfere with this assay;   however, presence of multiple variants in a sample may impact the %   interference.     01/12/2015 5.3 4.5 - 6.2 % Final       Review of patient's allergies indicates:   Allergen Reactions    Grass pollen-perennial rye, standard Shortness Of Breath    Horsetail (equisetum arvense) Shortness Of Breath    Mold extracts Shortness Of Breath       Past Medical History:   Diagnosis Date    Anxiety and depression     Asthma, exercise induced     Dry eyes     History of ADHD     Hypothyroid     URBAN (obstructive sleep apnea)     CPAP    Vitamin D deficiency        Family History   Problem Relation Age of Onset    Cataracts Father     Alzheimer's disease Father     Colon cancer Father     Heart failure Mother     Melanoma Neg Hx     Psoriasis Neg Hx     Lupus Neg Hx     Eczema Neg Hx     Acne Neg Hx     Breast cancer Neg Hx     Ovarian cancer Neg Hx     Thrombophilia Neg Hx        Social History     Socioeconomic History    Marital status:      Spouse name: Not on file    Number of children: 2    Years of education: Not on file    Highest education level: Not on file   Occupational History     Employer: [employer]   Social Needs    Financial resource strain: Not on file    Food insecurity     Worry: Not on file     Inability: Not on file    Transportation needs     Medical: Not on file     Non-medical: Not on file   Tobacco Use    Smoking status: Never Smoker    Smokeless tobacco: Never Used   Substance and Sexual Activity    Alcohol use: Yes     Comment: socially  No alcohol prior to surgery    Drug use: No    Sexual activity: Yes   Lifestyle    Physical activity     Days per week: Not on file     Minutes per session: Not on file    Stress: Not at all   Relationships    Social connections     Talks on phone: Not on file     Gets together: Not on file     Attends Orthodox service: Not on file     Active member of club or  organization: Not on file     Attends meetings of clubs or organizations: Not on file     Relationship status: Not on file   Other Topics Concern    Are you pregnant or think you may be? No    Breast-feeding No   Social History Narrative    Full time emplyed.       Past Surgical History:   Procedure Laterality Date    ANKLE SURGERY Right     BREAST BIOPSY Right     benign    CARPAL TUNNEL RELEASE Bilateral     CATARACT EXTRACTION BILATERAL W/ ANTERIOR VITRECTOMY      CATARACT EXTRACTION W/  INTRAOCULAR LENS IMPLANT  OU     SECTION      KNEE ARTHROSCOPY Left     KNEE SURGERY      right    LAPIDUS BUNIONECTOMY Left 2020    Procedure: BUNIONECTOMY, LAPIDUS;  Surgeon: Krzysztof Simmons DPM;  Location: [facility] OR;  Service: Podiatry;  Laterality: Left;    LENGTHENING OF ACHILLES TENDON Left 2020    Procedure: LENGTHENING, TENDON, ACHILLES;  Surgeon: Krzysztof Simmons DPM;  Location: [facility] OR;  Service: Podiatry;  Laterality: Left;    OSTEOTOMY OF METATARSAL BONE Left 2020    Procedure: OSTEOTOMY, METATARSAL BONE;  Surgeon: Krzysztof Simmons DPM;  Location: [facility] OR;  Service: Podiatry;  Laterality: Left;  2ND TOE    WISDOM TOOTH EXTRACTION         Review of Systems   Constitutional: Negative for chills, fatigue and fever.   HENT: Negative for hearing loss.    Eyes: Negative for photophobia and visual disturbance.   Respiratory: Negative for cough, chest tightness, shortness of breath and wheezing.    Cardiovascular: Negative for chest pain and palpitations.   Gastrointestinal: Negative for constipation, diarrhea, nausea and vomiting.   Endocrine: Negative for cold intolerance and heat intolerance.   Genitourinary: Negative for flank pain.   Musculoskeletal: Positive for gait problem. Negative for neck pain and neck stiffness.   Skin: Negative for wound.   Neurological: Negative for light-headedness and headaches.   Psychiatric/Behavioral: Negative for sleep disturbance.         "  Objective:   /68   Pulse 80   Ht 5' 5" (1.651 m)   Wt 102.3 kg (225 lb 8.5 oz)   LMP 01/07/2015   BMI 37.53 kg/m²       Physical Exam  LOWER EXTREMITY PHYSICAL EXAMINATION  ORTHOPEDIC: Anatomic alignment is noted to the 1st-3rd toe. Mild pedal edema is noted. No discomfort to palpation to any surface of border of the foot save for the plantar 1st tarsometatarsal joint. No hallux varus is noted.     Assessment:     1. Postop check    2. Hallux valgus (acquired), left foot    3. Hammer toes of both feet    4. Contracture, left ankle    5. Vitamin D deficiency          Plan:     Postop check  -     X-Ray Foot Complete Left; Future; Expected date: 11/10/2020    Hallux valgus (acquired), left foot  -     X-Ray Foot Complete Left; Future; Expected date: 11/10/2020    Hammer toes of both feet    Contracture, left ankle    Vitamin D deficiency  -     ergocalciferol (ERGOCALCIFEROL) 50,000 unit Cap; Take 1 capsule (50,000 Units total) by mouth every 7 days. for 12 doses  Dispense: 12 capsule; Refill: 0      Thorough discussion is had with the patient today, concerning the diagnosis, its etiology, and the treatment algorithm at present. XRAYS are reviewed in detail with the patient. All questions and concerns regarding findings and its/their implications are outlined and discussed.  No hallux varus.  Patient does have further healing to do at the plantar 1st tarsometatarsal joint, thus continue in the walking boot.  Please start oral vitamin-D.  Compression therapy. Range of motion exercises about the great toe.            Future Appointments   Date Time Provider Department Center   11/24/2020  8:00 AM [facility] XR1-DR [facility]   11/24/2020  8:15 AM Krzysztof Simmons DPM [facility]   2/11/2021  8:10 AM [facility] [address]   2/18/2021  9:20 AM MD MALINDA Gibbons [facility] [address]   10/5/2021  8:40 AM Elizabeth Lejeune, NP [facility] SLEEP [facility]           "

## 2020-11-23 ENCOUNTER — PATIENT MESSAGE (OUTPATIENT)
Dept: SLEEP MEDICINE | Facility: CLINIC | Age: 55
End: 2020-11-23

## 2020-11-24 ENCOUNTER — OFFICE VISIT (OUTPATIENT)
Dept: PODIATRY | Facility: CLINIC | Age: 55
End: 2020-11-24
Payer: COMMERCIAL

## 2020-11-24 ENCOUNTER — HOSPITAL ENCOUNTER (OUTPATIENT)
Dept: RADIOLOGY | Facility: HOSPITAL | Age: 55
Discharge: HOME | End: 2020-11-24
Attending: PODIATRIST
Payer: COMMERCIAL

## 2020-11-24 VITALS
SYSTOLIC BLOOD PRESSURE: 114 MMHG | BODY MASS INDEX: 38.95 KG/M2 | HEIGHT: 65 IN | HEART RATE: 72 BPM | DIASTOLIC BLOOD PRESSURE: 80 MMHG | WEIGHT: 233.81 LBS

## 2020-11-24 DIAGNOSIS — M20.42 HAMMER TOES OF BOTH FEET: ICD-10-CM

## 2020-11-24 DIAGNOSIS — M24.572 CONTRACTURE, LEFT ANKLE: ICD-10-CM

## 2020-11-24 DIAGNOSIS — M20.12 HALLUX VALGUS (ACQUIRED), LEFT FOOT: ICD-10-CM

## 2020-11-24 DIAGNOSIS — Z09 POSTOP CHECK: Primary | ICD-10-CM

## 2020-11-24 DIAGNOSIS — M20.41 HAMMER TOES OF BOTH FEET: ICD-10-CM

## 2020-11-24 DIAGNOSIS — Z09 POSTOP CHECK: ICD-10-CM

## 2020-11-24 PROCEDURE — 99024 POSTOP FOLLOW-UP VISIT: CPT | Mod: S$GLB,,, | Performed by: PODIATRIST

## 2020-11-24 PROCEDURE — 1126F PR PAIN SEVERITY QUANTIFIED, NO PAIN PRESENT: ICD-10-PCS | Mod: S$GLB,,, | Performed by: PODIATRIST

## 2020-11-24 PROCEDURE — 99024 PR POST-OP FOLLOW-UP VISIT: ICD-10-PCS | Mod: S$GLB,,, | Performed by: PODIATRIST

## 2020-11-24 PROCEDURE — 99999 PR PBB SHADOW E&M-EST. PATIENT-LVL IV: ICD-10-PCS | Mod: PBBFAC,,, | Performed by: PODIATRIST

## 2020-11-24 PROCEDURE — 1126F AMNT PAIN NOTED NONE PRSNT: CPT | Mod: S$GLB,,, | Performed by: PODIATRIST

## 2020-11-24 PROCEDURE — 99999 PR PBB SHADOW E&M-EST. PATIENT-LVL IV: CPT | Mod: PBBFAC,,, | Performed by: PODIATRIST

## 2020-11-24 PROCEDURE — 73630 X-RAY EXAM OF FOOT: CPT | Mod: 26,LT,, | Performed by: RADIOLOGY

## 2020-11-24 PROCEDURE — 3008F BODY MASS INDEX DOCD: CPT | Mod: CPTII,S$GLB,, | Performed by: PODIATRIST

## 2020-11-24 PROCEDURE — 3008F PR BODY MASS INDEX (BMI) DOCUMENTED: ICD-10-PCS | Mod: CPTII,S$GLB,, | Performed by: PODIATRIST

## 2020-11-24 PROCEDURE — 73630 XR FOOT COMPLETE 3 VIEW LEFT: ICD-10-PCS | Mod: 26,LT,, | Performed by: RADIOLOGY

## 2020-11-24 PROCEDURE — 73630 X-RAY EXAM OF FOOT: CPT | Mod: TC,LT

## 2020-11-24 NOTE — PROGRESS NOTES
Subjective:       Patient ID: Tarny Serrano is a 55 y.o. female.    Chief Complaint: Post-op Evaluation (DOS 09/18/2020 Bunionectomy, Lapidus, L. Foot, 0/10, tennis w/socks, non diabetic pt pcp Dr. Schroeder.)      HPI:  Taryn Serrano presents to the office today, s/p 9/18/2020 left Lapidus with 2nd and 3rd hammer toe repair with 2nd metatarsal osteotomy and GERRY. Prolonged protected WB due to evidence of delayed healing at the plantar 1st TMTJ. There has also been minimal radiographic evidence of hallux varus, but distal 1st metatarsal head is not clearly visualized on plain film. Patient states 2/10 pains at the plantar 1st TMTJ. States mild swelling. States continued Vit. D supplementation. Ambulatory with sneakers. States knee and hips pains on the RLE due to LLE CAM Walker.     Hemoglobin A1C   Date Value Ref Range Status   02/18/2020 5.0 4.0 - 5.6 % Final     Comment:     ADA Screening Guidelines:  5.7-6.4%  Consistent with prediabetes  >or=6.5%  Consistent with diabetes  High levels of fetal hemoglobin interfere with the HbA1C  assay. Heterozygous hemoglobin variants (HbS, HgC, etc)do  not significantly interfere with this assay.   However, presence of multiple variants may affect accuracy.     02/09/2018 4.9 4.0 - 5.6 % Final     Comment:     According to ADA guidelines, hemoglobin A1c <7.0% represents  optimal control in non-pregnant diabetic patients. Different  metrics may apply to specific patient populations.   Standards of Medical Care in Diabetes-2016.  For the purpose of screening for the presence of diabetes:  <5.7%     Consistent with the absence of diabetes  5.7-6.4%  Consistent with increasing risk for diabetes   (prediabetes)  >or=6.5%  Consistent with diabetes  Currently, no consensus exists for use of hemoglobin A1c  for diagnosis of diabetes for children.  This Hemoglobin A1c assay has significant interference with fetal   hemoglobin   (HbF). The results are invalid for patients  with abnormal amounts of   HbF,   including those with known Hereditary Persistence   of Fetal Hemoglobin. Heterozygous hemoglobin variants (HbAS, HbAC,   HbAD, HbAE, HbA2) do not significantly interfere with this assay;   however, presence of multiple variants in a sample may impact the %   interference.     01/12/2015 5.3 4.5 - 6.2 % Final       Review of patient's allergies indicates:   Allergen Reactions    Grass pollen-perennial rye, standard Shortness Of Breath    Horsetail (equisetum arvense) Shortness Of Breath    Mold extracts Shortness Of Breath       Past Medical History:   Diagnosis Date    Anxiety and depression     Asthma, exercise induced     Dry eyes     History of ADHD     Hypothyroid     URBAN (obstructive sleep apnea)     CPAP    Vitamin D deficiency        Family History   Problem Relation Age of Onset    Cataracts Father     Alzheimer's disease Father     Colon cancer Father     Heart failure Mother     Melanoma Neg Hx     Psoriasis Neg Hx     Lupus Neg Hx     Eczema Neg Hx     Acne Neg Hx     Breast cancer Neg Hx     Ovarian cancer Neg Hx     Thrombophilia Neg Hx        Social History     Socioeconomic History    Marital status:      Spouse name: Not on file    Number of children: 2    Years of education: Not on file    Highest education level: Not on file   Occupational History     Employer: [employer]   Social Needs    Financial resource strain: Not on file    Food insecurity     Worry: Not on file     Inability: Not on file    Transportation needs     Medical: Not on file     Non-medical: Not on file   Tobacco Use    Smoking status: Never Smoker    Smokeless tobacco: Never Used   Substance and Sexual Activity    Alcohol use: Yes     Comment: socially  No alcohol prior to surgery    Drug use: No    Sexual activity: Yes   Lifestyle    Physical activity     Days per week: Not on file     Minutes per session: Not on file    Stress: Not at all    Relationships    Social connections     Talks on phone: Not on file     Gets together: Not on file     Attends Yarsani service: Not on file     Active member of club or organization: Not on file     Attends meetings of clubs or organizations: Not on file     Relationship status: Not on file   Other Topics Concern    Are you pregnant or think you may be? No    Breast-feeding No   Social History Narrative    Full time emplyed.       Past Surgical History:   Procedure Laterality Date    ANKLE SURGERY Right     BREAST BIOPSY Right     benign    CARPAL TUNNEL RELEASE Bilateral     CATARACT EXTRACTION BILATERAL W/ ANTERIOR VITRECTOMY      CATARACT EXTRACTION W/  INTRAOCULAR LENS IMPLANT  OU     SECTION      KNEE ARTHROSCOPY Left     KNEE SURGERY      right    LAPIDUS BUNIONECTOMY Left 2020    Procedure: BUNIONECTOMY, LAPIDUS;  Surgeon: Krzysztof Simmons DPM;  Location: [facility] OR;  Service: Podiatry;  Laterality: Left;    LENGTHENING OF ACHILLES TENDON Left 2020    Procedure: LENGTHENING, TENDON, ACHILLES;  Surgeon: Krzysztof Simmons DPM;  Location: [facility] OR;  Service: Podiatry;  Laterality: Left;    OSTEOTOMY OF METATARSAL BONE Left 2020    Procedure: OSTEOTOMY, METATARSAL BONE;  Surgeon: Krzysztof Simmons DPM;  Location: [facility] OR;  Service: Podiatry;  Laterality: Left;  2ND TOE    WISDOM TOOTH EXTRACTION         Review of Systems   Constitutional: Negative for chills, fatigue and fever.   HENT: Negative for hearing loss.    Eyes: Negative for photophobia and visual disturbance.   Respiratory: Negative for cough, chest tightness, shortness of breath and wheezing.    Cardiovascular: Negative for chest pain and palpitations.   Gastrointestinal: Negative for constipation, diarrhea, nausea and vomiting.   Endocrine: Negative for cold intolerance and heat intolerance.   Genitourinary: Negative for flank pain.   Musculoskeletal: Positive for gait problem. Negative for neck pain and neck  "stiffness.   Skin: Negative for wound.   Neurological: Negative for light-headedness and headaches.   Psychiatric/Behavioral: Negative for sleep disturbance.          Objective:   /80   Pulse 72   Ht 5' 5" (1.651 m)   Wt 106.1 kg (233 lb 12.8 oz)   LMP 01/07/2015   BMI 38.91 kg/m²       Physical Exam  LOWER EXTREMITY PHYSICAL EXAMINATION  ORTHOPEDIC: Anatomic alignment is noted to the 1st-3rd toe. Mild edema is noted. Pains to the plantar 1st TMTJ.     Assessment:     1. Postop check    2. Hallux valgus (acquired), left foot    3. Hammer toes of both feet    4. Contracture, left ankle          Plan:     Postop check  -     Cancel: X-Ray Foot Complete Left; Future; Expected date: 11/24/2020  -     X-Ray Foot Complete Left; Future; Expected date: 11/24/2020    Hallux valgus (acquired), left foot  -     Cancel: X-Ray Foot Complete Left; Future; Expected date: 11/24/2020  -     X-Ray Foot Complete Left; Future; Expected date: 11/24/2020    Hammer toes of both feet    Contracture, left ankle      Thorough discussion is had with the patient today, concerning the diagnosis, its etiology, and the treatment algorithm at present. XRAYS are reviewed in detail with the patient. All questions and concerns regarding findings and its/their implications are outlined and discussed. Continue Vit. D supplementation. RICE therapy ATC for now. Delayed healing at the plantar 1st TMTJ. F/U in 4 weeks for repeat XRAY.           Future Appointments   Date Time Provider Department Center   12/28/2020  8:45 AM ON XR1- ON XRAY [facility]   12/28/2020  9:00 AM Krzysztof Simmons DPM [facility]   2/11/2021  8:10 AM [facility] [address]   2/18/2021  9:20 AM Alcye Schroeder MD [facility] [address]   10/5/2021  8:40 AM Elizabeth Lejeune, NP [facility] SLEEP [place]           "

## 2020-11-27 ENCOUNTER — PATIENT MESSAGE (OUTPATIENT)
Dept: FAMILY MEDICINE | Facility: CLINIC | Age: 55
End: 2020-11-27

## 2020-11-28 ENCOUNTER — OFFICE VISIT (OUTPATIENT)
Dept: URGENT CARE | Facility: CLINIC | Age: 55
End: 2020-11-28
Payer: COMMERCIAL

## 2020-11-28 VITALS
DIASTOLIC BLOOD PRESSURE: 69 MMHG | OXYGEN SATURATION: 99 % | RESPIRATION RATE: 16 BRPM | TEMPERATURE: 98 F | SYSTOLIC BLOOD PRESSURE: 132 MMHG | HEART RATE: 91 BPM

## 2020-11-28 DIAGNOSIS — R05.9 COUGH: ICD-10-CM

## 2020-11-28 DIAGNOSIS — U07.1 COVID-19 VIRUS INFECTION: Primary | ICD-10-CM

## 2020-11-28 DIAGNOSIS — U07.1 COVID-19 VIRUS DETECTED: ICD-10-CM

## 2020-11-28 DIAGNOSIS — R50.9 FEVER, UNSPECIFIED FEVER CAUSE: ICD-10-CM

## 2020-11-28 LAB
CTP QC/QA: YES
SARS-COV-2 RDRP RESP QL NAA+PROBE: POSITIVE

## 2020-11-28 PROCEDURE — 1126F PR PAIN SEVERITY QUANTIFIED, NO PAIN PRESENT: ICD-10-PCS | Mod: S$GLB,,, | Performed by: PHYSICIAN ASSISTANT

## 2020-11-28 PROCEDURE — U0002: ICD-10-PCS | Mod: QW,S$GLB,, | Performed by: PHYSICIAN ASSISTANT

## 2020-11-28 PROCEDURE — 99213 OFFICE O/P EST LOW 20 MIN: CPT | Mod: S$GLB,,, | Performed by: PHYSICIAN ASSISTANT

## 2020-11-28 PROCEDURE — U0002 COVID-19 LAB TEST NON-CDC: HCPCS | Mod: QW,S$GLB,, | Performed by: PHYSICIAN ASSISTANT

## 2020-11-28 PROCEDURE — 1126F AMNT PAIN NOTED NONE PRSNT: CPT | Mod: S$GLB,,, | Performed by: PHYSICIAN ASSISTANT

## 2020-11-28 PROCEDURE — 99213 PR OFFICE/OUTPT VISIT, EST, LEVL III, 20-29 MIN: ICD-10-PCS | Mod: S$GLB,,, | Performed by: PHYSICIAN ASSISTANT

## 2020-11-28 NOTE — PROGRESS NOTES
Subjective:       Patient ID: Taryn Serrano is a 55 y.o. female.    Vitals:  temperature is 98.2 °F (36.8 °C). Her blood pressure is 132/69 and her pulse is 91. Her respiration is 16 and oxygen saturation is 99%.     Chief Complaint: URI (fever, cough, congestion)    URI   This is a new problem. The current episode started in the past 7 days (11/26/2020). The problem has been waxing and waning. Maximum temperature: 100.3. Associated symptoms include congestion, coughing, headaches and a sore throat. Pertinent negatives include no chest pain, diarrhea, dysuria, nausea, rash or vomiting. Treatments tried: zyrtec, motrin, tylenol, mucinex. The treatment provided mild relief.       Constitution: Positive for sweating and fever. Negative for chills and fatigue.   HENT: Positive for congestion and sore throat.    Neck: Negative for painful lymph nodes.   Cardiovascular: Negative for chest pain and leg swelling.   Eyes: Negative for double vision and blurred vision.   Respiratory: Positive for cough. Negative for shortness of breath.    Gastrointestinal: Negative for nausea, vomiting and diarrhea.   Genitourinary: Negative for dysuria, frequency, urgency and history of kidney stones.   Musculoskeletal: Negative for joint pain, joint swelling, muscle cramps and muscle ache.   Skin: Negative for color change, pale, rash and bruising.   Allergic/Immunologic: Negative for seasonal allergies.   Neurological: Positive for light-headedness and headaches. Negative for dizziness, history of vertigo and passing out.   Hematologic/Lymphatic: Negative for swollen lymph nodes.   Psychiatric/Behavioral: Negative for nervous/anxious, sleep disturbance and depression. The patient is not nervous/anxious.        Objective:      Physical Exam   Constitutional: She is oriented to person, place, and time. She appears well-developed. She is cooperative.  Non-toxic appearance. She does not appear ill. No distress.   HENT:   Head:  Normocephalic and atraumatic.   Ears:   Right Ear: Hearing, tympanic membrane, external ear and ear canal normal.   Left Ear: Hearing, tympanic membrane, external ear and ear canal normal.   Nose: Nose normal. No mucosal edema, rhinorrhea or nasal deformity. No epistaxis. Right sinus exhibits no maxillary sinus tenderness and no frontal sinus tenderness. Left sinus exhibits no maxillary sinus tenderness and no frontal sinus tenderness.   Mouth/Throat: Uvula is midline, oropharynx is clear and moist and mucous membranes are normal. No trismus in the jaw. Normal dentition. No uvula swelling. No oropharyngeal exudate, posterior oropharyngeal edema or posterior oropharyngeal erythema.   Eyes: Conjunctivae and lids are normal. No scleral icterus.   Neck: Trachea normal, full passive range of motion without pain and phonation normal. Neck supple. No neck rigidity. No edema and no erythema present.   Cardiovascular: Normal rate, regular rhythm, normal heart sounds and normal pulses.   Pulmonary/Chest: Effort normal and breath sounds normal. No respiratory distress. She has no decreased breath sounds. She has no rhonchi.   Abdominal: Normal appearance.   Musculoskeletal: Normal range of motion.         General: No deformity.   Neurological: She is alert and oriented to person, place, and time. She exhibits normal muscle tone. Coordination normal.   Skin: Skin is warm, dry, intact, not diaphoretic and not pale. Psychiatric: Her speech is normal and behavior is normal. Judgment and thought content normal.   Nursing note and vitals reviewed.        Results for orders placed or performed in visit on 11/28/20   POCT COVID-19 Rapid Screening   Result Value Ref Range    POC Rapid COVID Positive (A) Negative     Acceptable Yes        Assessment:       1. COVID-19 virus infection    2. Fever, unspecified fever cause    3. Cough        Plan:       - Rapid COVID-19 positive  - Advised patient to stay home and self  quarantine until 10 days from onset of symptoms, and asymptomatic/fever free for at least 24 hours prior to resuming normal activity.  -Tylenol as needed for fever control. OTC medications prn for cold symptoms.   - Strict ED precautions given for any emergent symptoms.    COVID-19 virus infection    Fever, unspecified fever cause  -     POCT COVID-19 Rapid Screening    Cough  -     POCT COVID-19 Rapid Screening

## 2020-11-28 NOTE — LETTER
[id number] ? [place], [address] ? Phone [phone] ? Fax             Return to Work/School    Patient: Taryn Serrano  YOB: 1965   Date: 11/28/2020      To Whom It May Concern:     Taryn Serrano was in contact with/seen in my office on 11/28/2020. COVID-19 is present in our communities across the [facility]. Not all patients are eligible or appropriate to be tested. In this situation, your employee meets the following criteria:     Taryn Serrano has met the criteria for COVID-19 testing and has a POSITIVE result. She can return to work once they are asymptomatic for 24 hours without the use of fever reducing medications AND at least ten days from the start of symptoms (or from the first positive result if they have no symptoms).      If you have any questions or concerns, or if I can be of further assistance, please do not hesitate to contact me.     Sincerely,    Tatyana Payne PA-C

## 2020-12-28 ENCOUNTER — OFFICE VISIT (OUTPATIENT)
Dept: PODIATRY | Facility: CLINIC | Age: 55
End: 2020-12-28
Payer: COMMERCIAL

## 2020-12-28 ENCOUNTER — HOSPITAL ENCOUNTER (OUTPATIENT)
Dept: RADIOLOGY | Facility: HOSPITAL | Age: 55
Discharge: HOME | End: 2020-12-28
Attending: PODIATRIST
Payer: COMMERCIAL

## 2020-12-28 VITALS
HEART RATE: 80 BPM | SYSTOLIC BLOOD PRESSURE: 110 MMHG | DIASTOLIC BLOOD PRESSURE: 69 MMHG | WEIGHT: 228.31 LBS | HEIGHT: 65 IN | BODY MASS INDEX: 38.04 KG/M2

## 2020-12-28 DIAGNOSIS — Z09 POSTOP CHECK: ICD-10-CM

## 2020-12-28 DIAGNOSIS — M20.12 HALLUX VALGUS (ACQUIRED), LEFT FOOT: ICD-10-CM

## 2020-12-28 DIAGNOSIS — Z98.890 HISTORY OF FOOT SURGERY: Primary | ICD-10-CM

## 2020-12-28 DIAGNOSIS — M20.42 HAMMER TOES OF BOTH FEET: ICD-10-CM

## 2020-12-28 DIAGNOSIS — M24.572 CONTRACTURE, LEFT ANKLE: ICD-10-CM

## 2020-12-28 DIAGNOSIS — M20.41 HAMMER TOES OF BOTH FEET: ICD-10-CM

## 2020-12-28 PROCEDURE — 3008F BODY MASS INDEX DOCD: CPT | Mod: CPTII,S$GLB,, | Performed by: PODIATRIST

## 2020-12-28 PROCEDURE — 73630 X-RAY EXAM OF FOOT: CPT | Mod: 26,LT,, | Performed by: RADIOLOGY

## 2020-12-28 PROCEDURE — 99999 PR PBB SHADOW E&M-EST. PATIENT-LVL IV: CPT | Mod: PBBFAC,,, | Performed by: PODIATRIST

## 2020-12-28 PROCEDURE — 73630 X-RAY EXAM OF FOOT: CPT | Mod: TC,LT

## 2020-12-28 PROCEDURE — 1126F PR PAIN SEVERITY QUANTIFIED, NO PAIN PRESENT: ICD-10-PCS | Mod: S$GLB,,, | Performed by: PODIATRIST

## 2020-12-28 PROCEDURE — 99213 OFFICE O/P EST LOW 20 MIN: CPT | Mod: S$GLB,,, | Performed by: PODIATRIST

## 2020-12-28 PROCEDURE — 3008F PR BODY MASS INDEX (BMI) DOCUMENTED: ICD-10-PCS | Mod: CPTII,S$GLB,, | Performed by: PODIATRIST

## 2020-12-28 PROCEDURE — 73630 XR FOOT COMPLETE 3 VIEW LEFT: ICD-10-PCS | Mod: 26,LT,, | Performed by: RADIOLOGY

## 2020-12-28 PROCEDURE — 99213 PR OFFICE/OUTPT VISIT, EST, LEVL III, 20-29 MIN: ICD-10-PCS | Mod: S$GLB,,, | Performed by: PODIATRIST

## 2020-12-28 PROCEDURE — 99999 PR PBB SHADOW E&M-EST. PATIENT-LVL IV: ICD-10-PCS | Mod: PBBFAC,,, | Performed by: PODIATRIST

## 2020-12-28 PROCEDURE — 1126F AMNT PAIN NOTED NONE PRSNT: CPT | Mod: S$GLB,,, | Performed by: PODIATRIST

## 2020-12-28 NOTE — PROGRESS NOTES
Subjective:       Patient ID: Taryn Serrano is a 55 y.o. female.    Chief Complaint: Follow-up (Sugery l. foot, 0/10, walking boot, non diabetic, ppc Dr. Schroeder.)      HPI:  Taryn Serrano presents to the office today, s/p 9/18/2020 left Lapidus with 2nd and 3rd hammer toe repair with 2nd metatarsal osteotomy and GERRY. Prolonged protected WB due to evidence of delayed healing at the plantar 1st TMTJ. Patient states 0/10 pains at the plantar 1st TMTJ. States no swelling. States continued Vit. D supplementation. Ambulatory with CAM Walker on the LLE. Recovering for COVID19 at present.     Hemoglobin A1C   Date Value Ref Range Status   02/18/2020 5.0 4.0 - 5.6 % Final     Comment:     ADA Screening Guidelines:  5.7-6.4%  Consistent with prediabetes  >or=6.5%  Consistent with diabetes  High levels of fetal hemoglobin interfere with the HbA1C  assay. Heterozygous hemoglobin variants (HbS, HgC, etc)do  not significantly interfere with this assay.   However, presence of multiple variants may affect accuracy.     02/09/2018 4.9 4.0 - 5.6 % Final     Comment:     According to ADA guidelines, hemoglobin A1c <7.0% represents  optimal control in non-pregnant diabetic patients. Different  metrics may apply to specific patient populations.   Standards of Medical Care in Diabetes-2016.  For the purpose of screening for the presence of diabetes:  <5.7%     Consistent with the absence of diabetes  5.7-6.4%  Consistent with increasing risk for diabetes   (prediabetes)  >or=6.5%  Consistent with diabetes  Currently, no consensus exists for use of hemoglobin A1c  for diagnosis of diabetes for children.  This Hemoglobin A1c assay has significant interference with fetal   hemoglobin   (HbF). The results are invalid for patients with abnormal amounts of   HbF,   including those with known Hereditary Persistence   of Fetal Hemoglobin. Heterozygous hemoglobin variants (HbAS, HbAC,   HbAD, HbAE, HbA2) do not significantly  interfere with this assay;   however, presence of multiple variants in a sample may impact the %   interference.     01/12/2015 5.3 4.5 - 6.2 % Final       Review of patient's allergies indicates:   Allergen Reactions    Grass pollen-perennial rye, standard Shortness Of Breath    Horsetail (equisetum arvense) Shortness Of Breath    Mold extracts Shortness Of Breath       Past Medical History:   Diagnosis Date    Anxiety and depression     Asthma, exercise induced     Dry eyes     History of ADHD     Hypothyroid     URBAN (obstructive sleep apnea)     CPAP    Vitamin D deficiency        Family History   Problem Relation Age of Onset    Cataracts Father     Alzheimer's disease Father     Colon cancer Father     Heart failure Mother     Melanoma Neg Hx     Psoriasis Neg Hx     Lupus Neg Hx     Eczema Neg Hx     Acne Neg Hx     Breast cancer Neg Hx     Ovarian cancer Neg Hx     Thrombophilia Neg Hx        Social History     Socioeconomic History    Marital status:      Spouse name: Not on file    Number of children: 2    Years of education: Not on file    Highest education level: Not on file   Occupational History     Employer: [employer]   Social Needs    Financial resource strain: Not on file    Food insecurity     Worry: Not on file     Inability: Not on file    Transportation needs     Medical: Not on file     Non-medical: Not on file   Tobacco Use    Smoking status: Never Smoker    Smokeless tobacco: Never Used   Substance and Sexual Activity    Alcohol use: Yes     Comment: socially  No alcohol prior to surgery    Drug use: No    Sexual activity: Yes   Lifestyle    Physical activity     Days per week: Not on file     Minutes per session: Not on file    Stress: Not at all   Relationships    Social connections     Talks on phone: Not on file     Gets together: Not on file     Attends [employer] service: Not on file     Active member of club or organization: Not on  file     Attends meetings of clubs or organizations: Not on file     Relationship status: Not on file   Other Topics Concern    Are you pregnant or think you may be? No    Breast-feeding No   Social History Narrative    Full time emplyed.       Past Surgical History:   Procedure Laterality Date    ANKLE SURGERY Right     BREAST BIOPSY Right     benign    CARPAL TUNNEL RELEASE Bilateral     CATARACT EXTRACTION BILATERAL W/ ANTERIOR VITRECTOMY      CATARACT EXTRACTION W/  INTRAOCULAR LENS IMPLANT  OU     SECTION      KNEE ARTHROSCOPY Left     KNEE SURGERY      right    LAPIDUS BUNIONECTOMY Left 2020    Procedure: BUNIONECTOMY, LAPIDUS;  Surgeon: Krzysztof Simmons DPM;  Location: [facility] OR;  Service: Podiatry;  Laterality: Left;    LENGTHENING OF ACHILLES TENDON Left 2020    Procedure: LENGTHENING, TENDON, ACHILLES;  Surgeon: Krzysztof Simmons DPM;  Location: [facility] OR;  Service: Podiatry;  Laterality: Left;    OSTEOTOMY OF METATARSAL BONE Left 2020    Procedure: OSTEOTOMY, METATARSAL BONE;  Surgeon: Krzysztof Simmons DPM;  Location: [facility] OR;  Service: Podiatry;  Laterality: Left;  2ND TOE    WISDOM TOOTH EXTRACTION         Review of Systems   Constitutional: Negative for chills, fatigue and fever.   HENT: Negative for hearing loss.    Eyes: Negative for photophobia and visual disturbance.   Respiratory: Negative for cough, chest tightness, shortness of breath and wheezing.    Cardiovascular: Negative for chest pain and palpitations.   Gastrointestinal: Negative for constipation, diarrhea, nausea and vomiting.   Endocrine: Negative for cold intolerance and heat intolerance.   Genitourinary: Negative for flank pain.   Musculoskeletal: Negative for gait problem, neck pain and neck stiffness.   Skin: Negative for wound.   Neurological: Negative for light-headedness and headaches.   Psychiatric/Behavioral: Negative for sleep disturbance. Decreased concentration:             Objective:  "  /69   Pulse 80   Ht 5' 5" (1.651 m)   Wt 103.5 kg (228 lb 4.6 oz)   LMP 01/07/2015   BMI 37.99 kg/m²                 Physical Exam  LOWER EXTREMITY PHYSICAL EXAMINATION  ORTHOPEDIC: Anatomic alignment is noted to the 1st-3rd toe. No edema is noted. No pains to the plantar 1st TMTJ.  Decreased range of motion, right great toe.    VASCULAR: On the left foot, the dorsalis pedis pulse is 2/4 and the posterior tibial pulse is 2/4. Capillary refill time is less than 3 seconds. Hair growth is present on the dorsum of the foot and at the digits. No rubor is present. Proximal to distal temperature is warm to warm.    DERMATOLOGY: Skin is supple, dry and intact.     NEUROLOGY: Sensation to light touch is intact. Proprioception is intact. Sensation to pin prick is intact.       Assessment:     1. History of foot surgery    2. Hallux valgus (acquired), left foot    3. Hammer toes of both feet    4. Contracture, left ankle          Plan:     History of foot surgery    Hallux valgus (acquired), left foot    Hammer toes of both feet    Contracture, left ankle      Thorough discussion is had with the patient today, concerning the diagnosis, its etiology, and the treatment algorithm at present. XRAYS are reviewed in detail with the patient. All questions and concerns regarding findings and its/their implications are outlined and discussed. Continue Vit. D supplementation until completion. Patient may discontinue the walking boot and transition to normal shoe gear.  Range of motion about the great toe will improve with gait with normal shoe gear.          Future Appointments   Date Time Provider Department Center   2/11/2021  8:10 AM [facility]   2/18/2021  9:20 AM [facility]lyce [facility]eder [facility]   10/5/2021  8:40 AM Elizabeth Lejeune, NP [facility] SLEEP [facility]             "

## 2021-01-08 ENCOUNTER — PATIENT MESSAGE (OUTPATIENT)
Dept: FAMILY MEDICINE | Facility: CLINIC | Age: 56
End: 2021-01-08

## 2021-02-11 ENCOUNTER — LAB VISIT (OUTPATIENT)
Dept: LAB | Facility: HOSPITAL | Age: 56
End: 2021-02-11
Attending: INTERNAL MEDICINE
Payer: COMMERCIAL

## 2021-02-11 DIAGNOSIS — E03.9 ACQUIRED HYPOTHYROIDISM: ICD-10-CM

## 2021-02-11 DIAGNOSIS — E78.49 OTHER HYPERLIPIDEMIA: ICD-10-CM

## 2021-02-11 DIAGNOSIS — E55.9 VITAMIN D DEFICIENCY: ICD-10-CM

## 2021-02-11 DIAGNOSIS — Z29.9 PREVENTIVE MEASURE: ICD-10-CM

## 2021-02-11 LAB
BASOPHILS # BLD AUTO: 0.02 K/UL (ref 0–0.2)
BASOPHILS NFR BLD: 0.4 % (ref 0–1.9)
DIFFERENTIAL METHOD: ABNORMAL
EOSINOPHIL # BLD AUTO: 0.1 K/UL (ref 0–0.5)
EOSINOPHIL NFR BLD: 1.3 % (ref 0–8)
ERYTHROCYTE [DISTWIDTH] IN BLOOD BY AUTOMATED COUNT: 15.8 % (ref 11.5–14.5)
HCT VFR BLD AUTO: 45.2 % (ref 37–48.5)
HGB BLD-MCNC: 14.3 G/DL (ref 12–16)
IMM GRANULOCYTES # BLD AUTO: 0.02 K/UL (ref 0–0.04)
IMM GRANULOCYTES NFR BLD AUTO: 0.4 % (ref 0–0.5)
LYMPHOCYTES # BLD AUTO: 1.1 K/UL (ref 1–4.8)
LYMPHOCYTES NFR BLD: 19.9 % (ref 18–48)
MCH RBC QN AUTO: 30.4 PG (ref 27–31)
MCHC RBC AUTO-ENTMCNC: 31.6 G/DL (ref 32–36)
MCV RBC AUTO: 96 FL (ref 82–98)
MONOCYTES # BLD AUTO: 0.5 K/UL (ref 0.3–1)
MONOCYTES NFR BLD: 8.3 % (ref 4–15)
NEUTROPHILS # BLD AUTO: 3.8 K/UL (ref 1.8–7.7)
NEUTROPHILS NFR BLD: 69.7 % (ref 38–73)
NRBC BLD-RTO: 0 /100 WBC
PLATELET # BLD AUTO: 191 K/UL (ref 150–350)
PMV BLD AUTO: 9.6 FL (ref 9.2–12.9)
RBC # BLD AUTO: 4.7 M/UL (ref 4–5.4)
WBC # BLD AUTO: 5.43 K/UL (ref 3.9–12.7)

## 2021-02-11 PROCEDURE — 36415 COLL VENOUS BLD VENIPUNCTURE: CPT | Mod: PO

## 2021-02-11 PROCEDURE — 84443 ASSAY THYROID STIM HORMONE: CPT

## 2021-02-11 PROCEDURE — 83036 HEMOGLOBIN GLYCOSYLATED A1C: CPT

## 2021-02-11 PROCEDURE — 80061 LIPID PANEL: CPT

## 2021-02-11 PROCEDURE — 80053 COMPREHEN METABOLIC PANEL: CPT

## 2021-02-11 PROCEDURE — 82306 VITAMIN D 25 HYDROXY: CPT

## 2021-02-11 PROCEDURE — 85025 COMPLETE CBC W/AUTO DIFF WBC: CPT

## 2021-02-12 LAB
25(OH)D3+25(OH)D2 SERPL-MCNC: 52 NG/ML (ref 30–96)
ALBUMIN SERPL BCP-MCNC: 3.7 G/DL (ref 3.5–5.2)
ALP SERPL-CCNC: 101 U/L (ref 55–135)
ALT SERPL W/O P-5'-P-CCNC: 22 U/L (ref 10–44)
ANION GAP SERPL CALC-SCNC: 9 MMOL/L (ref 8–16)
AST SERPL-CCNC: 22 U/L (ref 10–40)
BILIRUB SERPL-MCNC: 0.5 MG/DL (ref 0.1–1)
BUN SERPL-MCNC: 17 MG/DL (ref 6–20)
CALCIUM SERPL-MCNC: 9.2 MG/DL (ref 8.7–10.5)
CHLORIDE SERPL-SCNC: 101 MMOL/L (ref 95–110)
CHOLEST SERPL-MCNC: 247 MG/DL (ref 120–199)
CHOLEST/HDLC SERPL: 2.2 {RATIO} (ref 2–5)
CO2 SERPL-SCNC: 28 MMOL/L (ref 23–29)
CREAT SERPL-MCNC: 1.1 MG/DL (ref 0.5–1.4)
EST. GFR  (AFRICAN AMERICAN): >60 ML/MIN/1.73 M^2
EST. GFR  (NON AFRICAN AMERICAN): 55.9 ML/MIN/1.73 M^2
ESTIMATED AVG GLUCOSE: 94 MG/DL (ref 68–131)
GLUCOSE SERPL-MCNC: 69 MG/DL (ref 70–110)
HBA1C MFR BLD: 4.9 % (ref 4–5.6)
HDLC SERPL-MCNC: 114 MG/DL (ref 40–75)
HDLC SERPL: 46.2 % (ref 20–50)
LDLC SERPL CALC-MCNC: 113.8 MG/DL (ref 63–159)
NONHDLC SERPL-MCNC: 133 MG/DL
POTASSIUM SERPL-SCNC: 4.6 MMOL/L (ref 3.5–5.1)
PROT SERPL-MCNC: 7.4 G/DL (ref 6–8.4)
SODIUM SERPL-SCNC: 138 MMOL/L (ref 136–145)
TRIGL SERPL-MCNC: 96 MG/DL (ref 30–150)
TSH SERPL DL<=0.005 MIU/L-ACNC: 2.95 UIU/ML (ref 0.4–4)

## 2021-02-18 ENCOUNTER — OFFICE VISIT (OUTPATIENT)
Dept: FAMILY MEDICINE | Facility: CLINIC | Age: 56
End: 2021-02-18
Payer: COMMERCIAL

## 2021-02-18 ENCOUNTER — LAB VISIT (OUTPATIENT)
Dept: LAB | Facility: HOSPITAL | Age: 56
End: 2021-02-18
Attending: INTERNAL MEDICINE
Payer: COMMERCIAL

## 2021-02-18 ENCOUNTER — PATIENT MESSAGE (OUTPATIENT)
Dept: FAMILY MEDICINE | Facility: CLINIC | Age: 56
End: 2021-02-18

## 2021-02-18 VITALS
HEART RATE: 77 BPM | BODY MASS INDEX: 38.6 KG/M2 | HEIGHT: 65 IN | DIASTOLIC BLOOD PRESSURE: 70 MMHG | TEMPERATURE: 98 F | OXYGEN SATURATION: 100 % | SYSTOLIC BLOOD PRESSURE: 102 MMHG | WEIGHT: 231.69 LBS

## 2021-02-18 DIAGNOSIS — N18.31 STAGE 3A CHRONIC KIDNEY DISEASE: ICD-10-CM

## 2021-02-18 DIAGNOSIS — J45.990 ASTHMA, EXERCISE INDUCED: ICD-10-CM

## 2021-02-18 DIAGNOSIS — E03.9 ACQUIRED HYPOTHYROIDISM: ICD-10-CM

## 2021-02-18 DIAGNOSIS — Z00.00 ENCOUNTER FOR PREVENTIVE HEALTH EXAMINATION: Primary | ICD-10-CM

## 2021-02-18 DIAGNOSIS — M85.80 OSTEOPENIA, UNSPECIFIED LOCATION: ICD-10-CM

## 2021-02-18 DIAGNOSIS — E55.9 VITAMIN D DEFICIENCY: ICD-10-CM

## 2021-02-18 DIAGNOSIS — H60.312 CHRONIC DIFFUSE OTITIS EXTERNA OF LEFT EAR: ICD-10-CM

## 2021-02-18 DIAGNOSIS — F41.9 ANXIETY AND DEPRESSION: ICD-10-CM

## 2021-02-18 DIAGNOSIS — G47.33 OSA (OBSTRUCTIVE SLEEP APNEA): ICD-10-CM

## 2021-02-18 DIAGNOSIS — R82.90 URINE MALODOR: ICD-10-CM

## 2021-02-18 DIAGNOSIS — F32.A ANXIETY AND DEPRESSION: ICD-10-CM

## 2021-02-18 LAB
BILIRUB UR QL STRIP: NEGATIVE
CLARITY UR REFRACT.AUTO: CLEAR
COLOR UR AUTO: YELLOW
GLUCOSE UR QL STRIP: NEGATIVE
HGB UR QL STRIP: NEGATIVE
KETONES UR QL STRIP: NEGATIVE
LEUKOCYTE ESTERASE UR QL STRIP: NEGATIVE
NITRITE UR QL STRIP: NEGATIVE
PH UR STRIP: 5 [PH] (ref 5–8)
PROT UR QL STRIP: NEGATIVE
SP GR UR STRIP: 1.02 (ref 1–1.03)
URN SPEC COLLECT METH UR: NORMAL

## 2021-02-18 PROCEDURE — 1126F PR PAIN SEVERITY QUANTIFIED, NO PAIN PRESENT: ICD-10-PCS | Mod: S$GLB,,, | Performed by: INTERNAL MEDICINE

## 2021-02-18 PROCEDURE — 99396 PREV VISIT EST AGE 40-64: CPT | Mod: S$GLB,,, | Performed by: INTERNAL MEDICINE

## 2021-02-18 PROCEDURE — 81003 URINALYSIS AUTO W/O SCOPE: CPT

## 2021-02-18 PROCEDURE — 3008F PR BODY MASS INDEX (BMI) DOCUMENTED: ICD-10-PCS | Mod: CPTII,S$GLB,, | Performed by: INTERNAL MEDICINE

## 2021-02-18 PROCEDURE — 3008F BODY MASS INDEX DOCD: CPT | Mod: CPTII,S$GLB,, | Performed by: INTERNAL MEDICINE

## 2021-02-18 PROCEDURE — 1126F AMNT PAIN NOTED NONE PRSNT: CPT | Mod: S$GLB,,, | Performed by: INTERNAL MEDICINE

## 2021-02-18 PROCEDURE — 99999 PR PBB SHADOW E&M-EST. PATIENT-LVL V: CPT | Mod: PBBFAC,,, | Performed by: INTERNAL MEDICINE

## 2021-02-18 PROCEDURE — 99999 PR PBB SHADOW E&M-EST. PATIENT-LVL V: ICD-10-PCS | Mod: PBBFAC,,, | Performed by: INTERNAL MEDICINE

## 2021-02-18 PROCEDURE — 99396 PR PREVENTIVE VISIT,EST,40-64: ICD-10-PCS | Mod: S$GLB,,, | Performed by: INTERNAL MEDICINE

## 2021-02-18 RX ORDER — NEOMYCIN SULFATE, POLYMYXIN B SULFATE AND HYDROCORTISONE 10; 3.5; 1 MG/ML; MG/ML; [USP'U]/ML
3 SUSPENSION/ DROPS AURICULAR (OTIC) 3 TIMES DAILY
Qty: 10 ML | Refills: 2 | Status: SHIPPED | OUTPATIENT
Start: 2021-02-18 | End: 2021-06-10

## 2021-03-15 ENCOUNTER — OFFICE VISIT (OUTPATIENT)
Dept: OBSTETRICS AND GYNECOLOGY | Facility: CLINIC | Age: 56
End: 2021-03-15
Payer: COMMERCIAL

## 2021-03-15 ENCOUNTER — HOSPITAL ENCOUNTER (OUTPATIENT)
Dept: RADIOLOGY | Facility: HOSPITAL | Age: 56
Discharge: HOME | End: 2021-03-15
Attending: INTERNAL MEDICINE
Payer: COMMERCIAL

## 2021-03-15 ENCOUNTER — PATIENT MESSAGE (OUTPATIENT)
Dept: FAMILY MEDICINE | Facility: CLINIC | Age: 56
End: 2021-03-15

## 2021-03-15 VITALS
SYSTOLIC BLOOD PRESSURE: 118 MMHG | HEIGHT: 65 IN | DIASTOLIC BLOOD PRESSURE: 74 MMHG | BODY MASS INDEX: 38.79 KG/M2 | WEIGHT: 232.81 LBS

## 2021-03-15 VITALS — HEIGHT: 65 IN | BODY MASS INDEX: 38.6 KG/M2 | WEIGHT: 231.69 LBS

## 2021-03-15 DIAGNOSIS — Z00.00 ENCOUNTER FOR PREVENTIVE HEALTH EXAMINATION: ICD-10-CM

## 2021-03-15 DIAGNOSIS — Z01.419 WELL WOMAN EXAM WITH ROUTINE GYNECOLOGICAL EXAM: Primary | ICD-10-CM

## 2021-03-15 PROCEDURE — 99396 PREV VISIT EST AGE 40-64: CPT | Mod: S$GLB,,, | Performed by: NURSE PRACTITIONER

## 2021-03-15 PROCEDURE — 77067 MAMMO DIGITAL SCREENING BILAT WITH TOMO: ICD-10-PCS | Mod: 26,,, | Performed by: RADIOLOGY

## 2021-03-15 PROCEDURE — 99999 PR PBB SHADOW E&M-EST. PATIENT-LVL IV: CPT | Mod: PBBFAC,,, | Performed by: NURSE PRACTITIONER

## 2021-03-15 PROCEDURE — 77063 BREAST TOMOSYNTHESIS BI: CPT | Mod: 26,,, | Performed by: RADIOLOGY

## 2021-03-15 PROCEDURE — 3008F PR BODY MASS INDEX (BMI) DOCUMENTED: ICD-10-PCS | Mod: CPTII,S$GLB,, | Performed by: NURSE PRACTITIONER

## 2021-03-15 PROCEDURE — 99999 PR PBB SHADOW E&M-EST. PATIENT-LVL IV: ICD-10-PCS | Mod: PBBFAC,,, | Performed by: NURSE PRACTITIONER

## 2021-03-15 PROCEDURE — 99396 PR PREVENTIVE VISIT,EST,40-64: ICD-10-PCS | Mod: S$GLB,,, | Performed by: NURSE PRACTITIONER

## 2021-03-15 PROCEDURE — 3008F BODY MASS INDEX DOCD: CPT | Mod: CPTII,S$GLB,, | Performed by: NURSE PRACTITIONER

## 2021-03-15 PROCEDURE — 77067 SCR MAMMO BI INCL CAD: CPT | Mod: TC

## 2021-03-15 PROCEDURE — 77063 MAMMO DIGITAL SCREENING BILAT WITH TOMO: ICD-10-PCS | Mod: 26,,, | Performed by: RADIOLOGY

## 2021-03-15 PROCEDURE — 77067 SCR MAMMO BI INCL CAD: CPT | Mod: 26,,, | Performed by: RADIOLOGY

## 2021-04-20 ENCOUNTER — TELEPHONE (OUTPATIENT)
Dept: PULMONOLOGY | Facility: CLINIC | Age: 56
End: 2021-04-20

## 2021-04-27 ENCOUNTER — PATIENT MESSAGE (OUTPATIENT)
Dept: FAMILY MEDICINE | Facility: CLINIC | Age: 56
End: 2021-04-27

## 2021-04-28 ENCOUNTER — PATIENT MESSAGE (OUTPATIENT)
Dept: FAMILY MEDICINE | Facility: CLINIC | Age: 56
End: 2021-04-28

## 2021-04-28 ENCOUNTER — PATIENT MESSAGE (OUTPATIENT)
Dept: SLEEP MEDICINE | Facility: CLINIC | Age: 56
End: 2021-04-28

## 2021-04-28 DIAGNOSIS — J45.990 ASTHMA, EXERCISE INDUCED: Primary | ICD-10-CM

## 2021-05-07 ENCOUNTER — HOSPITAL ENCOUNTER (OUTPATIENT)
Dept: RADIOLOGY | Facility: HOSPITAL | Age: 56
Discharge: HOME | End: 2021-05-07
Attending: NURSE PRACTITIONER
Payer: COMMERCIAL

## 2021-05-07 ENCOUNTER — OFFICE VISIT (OUTPATIENT)
Dept: PULMONOLOGY | Facility: CLINIC | Age: 56
End: 2021-05-07
Payer: COMMERCIAL

## 2021-05-07 VITALS
BODY MASS INDEX: 38.89 KG/M2 | DIASTOLIC BLOOD PRESSURE: 76 MMHG | OXYGEN SATURATION: 100 % | HEIGHT: 65 IN | SYSTOLIC BLOOD PRESSURE: 108 MMHG | WEIGHT: 233.44 LBS | RESPIRATION RATE: 18 BRPM | HEART RATE: 86 BPM

## 2021-05-07 DIAGNOSIS — J45.909 ASTHMA, UNSPECIFIED ASTHMA SEVERITY, UNSPECIFIED WHETHER COMPLICATED, UNSPECIFIED WHETHER PERSISTENT: Primary | ICD-10-CM

## 2021-05-07 DIAGNOSIS — E66.9 OBESITY, CLASS II, BMI 35-39.9: ICD-10-CM

## 2021-05-07 DIAGNOSIS — G47.33 OSA (OBSTRUCTIVE SLEEP APNEA): ICD-10-CM

## 2021-05-07 DIAGNOSIS — G93.31 POST VIRAL SYNDROME: ICD-10-CM

## 2021-05-07 DIAGNOSIS — J45.909 ASTHMA, UNSPECIFIED ASTHMA SEVERITY, UNSPECIFIED WHETHER COMPLICATED, UNSPECIFIED WHETHER PERSISTENT: ICD-10-CM

## 2021-05-07 DIAGNOSIS — Z86.16 HISTORY OF 2019 NOVEL CORONAVIRUS DISEASE (COVID-19): ICD-10-CM

## 2021-05-07 PROCEDURE — 99999 PR PBB SHADOW E&M-EST. PATIENT-LVL IV: ICD-10-PCS | Mod: PBBFAC,,, | Performed by: NURSE PRACTITIONER

## 2021-05-07 PROCEDURE — 99999 PR PBB SHADOW E&M-EST. PATIENT-LVL IV: CPT | Mod: PBBFAC,,, | Performed by: NURSE PRACTITIONER

## 2021-05-07 PROCEDURE — 99214 OFFICE O/P EST MOD 30 MIN: CPT | Mod: S$GLB,,, | Performed by: NURSE PRACTITIONER

## 2021-05-07 PROCEDURE — 71046 X-RAY EXAM CHEST 2 VIEWS: CPT | Mod: TC

## 2021-05-07 PROCEDURE — 71046 XR CHEST PA AND LATERAL: ICD-10-PCS | Mod: 26,,, | Performed by: RADIOLOGY

## 2021-05-07 PROCEDURE — 99214 PR OFFICE/OUTPT VISIT, EST, LEVL IV, 30-39 MIN: ICD-10-PCS | Mod: S$GLB,,, | Performed by: NURSE PRACTITIONER

## 2021-05-07 PROCEDURE — 3008F PR BODY MASS INDEX (BMI) DOCUMENTED: ICD-10-PCS | Mod: CPTII,S$GLB,, | Performed by: NURSE PRACTITIONER

## 2021-05-07 PROCEDURE — 3008F BODY MASS INDEX DOCD: CPT | Mod: CPTII,S$GLB,, | Performed by: NURSE PRACTITIONER

## 2021-05-07 PROCEDURE — 71046 X-RAY EXAM CHEST 2 VIEWS: CPT | Mod: 26,,, | Performed by: RADIOLOGY

## 2021-05-07 RX ORDER — FLUTICASONE FUROATE, UMECLIDINIUM BROMIDE AND VILANTEROL TRIFENATATE 200; 62.5; 25 UG/1; UG/1; UG/1
1 POWDER RESPIRATORY (INHALATION) DAILY
Qty: 60 EACH | Refills: 11 | Status: SHIPPED | OUTPATIENT
Start: 2021-05-07 | End: 2022-05-26

## 2021-05-20 ENCOUNTER — OFFICE VISIT (OUTPATIENT)
Dept: URGENT CARE | Facility: CLINIC | Age: 56
End: 2021-05-20
Payer: COMMERCIAL

## 2021-05-20 VITALS
TEMPERATURE: 98 F | OXYGEN SATURATION: 100 % | WEIGHT: 228 LBS | SYSTOLIC BLOOD PRESSURE: 124 MMHG | BODY MASS INDEX: 38.93 KG/M2 | HEART RATE: 74 BPM | DIASTOLIC BLOOD PRESSURE: 65 MMHG | HEIGHT: 64 IN

## 2021-05-20 DIAGNOSIS — R51.9 UNILATERAL OCCIPITAL HEADACHE: ICD-10-CM

## 2021-05-20 DIAGNOSIS — H66.91 RIGHT OTITIS MEDIA, UNSPECIFIED OTITIS MEDIA TYPE: Primary | ICD-10-CM

## 2021-05-20 PROCEDURE — 3008F PR BODY MASS INDEX (BMI) DOCUMENTED: ICD-10-PCS | Mod: CPTII,S$GLB,, | Performed by: PHYSICIAN ASSISTANT

## 2021-05-20 PROCEDURE — 3008F BODY MASS INDEX DOCD: CPT | Mod: CPTII,S$GLB,, | Performed by: PHYSICIAN ASSISTANT

## 2021-05-20 PROCEDURE — 96372 THER/PROPH/DIAG INJ SC/IM: CPT | Mod: S$GLB,,, | Performed by: PHYSICIAN ASSISTANT

## 2021-05-20 PROCEDURE — 99214 PR OFFICE/OUTPT VISIT, EST, LEVL IV, 30-39 MIN: ICD-10-PCS | Mod: 25,S$GLB,, | Performed by: PHYSICIAN ASSISTANT

## 2021-05-20 PROCEDURE — 96372 PR INJECTION,THERAP/PROPH/DIAG2ST, IM OR SUBCUT: ICD-10-PCS | Mod: S$GLB,,, | Performed by: PHYSICIAN ASSISTANT

## 2021-05-20 PROCEDURE — 99214 OFFICE O/P EST MOD 30 MIN: CPT | Mod: 25,S$GLB,, | Performed by: PHYSICIAN ASSISTANT

## 2021-05-20 PROCEDURE — 1125F AMNT PAIN NOTED PAIN PRSNT: CPT | Mod: S$GLB,,, | Performed by: PHYSICIAN ASSISTANT

## 2021-05-20 PROCEDURE — 1125F PR PAIN SEVERITY QUANTIFIED, PAIN PRESENT: ICD-10-PCS | Mod: S$GLB,,, | Performed by: PHYSICIAN ASSISTANT

## 2021-05-20 RX ORDER — KETOROLAC TROMETHAMINE 30 MG/ML
30 INJECTION, SOLUTION INTRAMUSCULAR; INTRAVENOUS
Status: COMPLETED | OUTPATIENT
Start: 2021-05-20 | End: 2021-05-20

## 2021-05-20 RX ORDER — AMOXICILLIN AND CLAVULANATE POTASSIUM 875; 125 MG/1; MG/1
1 TABLET, FILM COATED ORAL 2 TIMES DAILY
Qty: 20 TABLET | Refills: 0 | Status: SHIPPED | OUTPATIENT
Start: 2021-05-20 | End: 2021-06-10

## 2021-05-20 RX ADMIN — KETOROLAC TROMETHAMINE 30 MG: 30 INJECTION, SOLUTION INTRAMUSCULAR; INTRAVENOUS at 06:05

## 2021-05-23 ENCOUNTER — TELEPHONE (OUTPATIENT)
Dept: URGENT CARE | Facility: CLINIC | Age: 56
End: 2021-05-23

## 2021-05-27 ENCOUNTER — PATIENT MESSAGE (OUTPATIENT)
Dept: PHYSICAL MEDICINE AND REHAB | Facility: CLINIC | Age: 56
End: 2021-05-27

## 2021-06-10 ENCOUNTER — CLINICAL SUPPORT (OUTPATIENT)
Dept: PULMONOLOGY | Facility: CLINIC | Age: 56
End: 2021-06-10
Payer: COMMERCIAL

## 2021-06-10 ENCOUNTER — OFFICE VISIT (OUTPATIENT)
Dept: PULMONOLOGY | Facility: CLINIC | Age: 56
End: 2021-06-10
Payer: COMMERCIAL

## 2021-06-10 VITALS
HEART RATE: 80 BPM | OXYGEN SATURATION: 98 % | RESPIRATION RATE: 14 BRPM | BODY MASS INDEX: 38.91 KG/M2 | WEIGHT: 227.94 LBS | SYSTOLIC BLOOD PRESSURE: 100 MMHG | HEIGHT: 64 IN | DIASTOLIC BLOOD PRESSURE: 60 MMHG

## 2021-06-10 DIAGNOSIS — Z86.16 HISTORY OF 2019 NOVEL CORONAVIRUS DISEASE (COVID-19): ICD-10-CM

## 2021-06-10 DIAGNOSIS — J45.909 ASTHMA, UNSPECIFIED ASTHMA SEVERITY, UNSPECIFIED WHETHER COMPLICATED, UNSPECIFIED WHETHER PERSISTENT: ICD-10-CM

## 2021-06-10 DIAGNOSIS — Z86.16 HISTORY OF 2019 NOVEL CORONAVIRUS DISEASE (COVID-19): Primary | ICD-10-CM

## 2021-06-10 DIAGNOSIS — G93.31 POST VIRAL SYNDROME: ICD-10-CM

## 2021-06-10 DIAGNOSIS — G47.33 OSA (OBSTRUCTIVE SLEEP APNEA): ICD-10-CM

## 2021-06-10 LAB
BRPFT: ABNORMAL
DLCO SINGLE BREATH LLN: 17.45
DLCO SINGLE BREATH PRE REF: 73.7 %
DLCO SINGLE BREATH REF: 23.18
DLCOC SBVA LLN: 3.2
DLCOC SBVA REF: 4.69
DLCOC SINGLE BREATH LLN: 17.45
DLCOC SINGLE BREATH REF: 23.18
DLCOVA LLN: 3.2
DLCOVA PRE REF: 96.6 %
DLCOVA PRE: 4.53 ML/(MIN*MMHG*L) (ref 3.2–6.19)
DLCOVA REF: 4.69
ERV LLN: -16449.16
ERV PRE REF: 110.9 %
ERV REF: 0.84
FEF 25 75 CHG: -33.2 %
FEF 25 75 LLN: 1.24
FEF 25 75 POST REF: 103.8 %
FEF 25 75 PRE REF: 155.4 %
FEF 25 75 REF: 2.36
FET100 CHG: 19.6 %
FEV1 CHG: -3.2 %
FEV1 FVC CHG: -0.3 %
FEV1 FVC LLN: 68
FEV1 FVC POST REF: 108.7 %
FEV1 FVC PRE REF: 109 %
FEV1 FVC REF: 80
FEV1 LLN: 1.95
FEV1 POST REF: 92.6 %
FEV1 PRE REF: 95.7 %
FEV1 REF: 2.55
FRCPLETH LLN: 1.88
FRCPLETH PREREF: 79.6 %
FRCPLETH REF: 2.7
FVC CHG: -2.9 %
FVC LLN: 2.47
FVC POST REF: 84.7 %
FVC PRE REF: 87.2 %
FVC REF: 3.23
IVC PRE: 2.84 L (ref 2.47–4.03)
IVC SINGLE BREATH LLN: 2.47
IVC SINGLE BREATH PRE REF: 87.9 %
IVC SINGLE BREATH REF: 3.23
MVV LLN: 81
MVV PRE REF: 112.8 %
MVV REF: 96
PEF CHG: -37.9 %
PEF LLN: 4.74
PEF POST REF: 70.6 %
PEF PRE REF: 113.7 %
PEF REF: 6.45
POST FEF 25 75: 2.45 L/S (ref 1.24–3.86)
POST FET 100: 6.58 SEC
POST FEV1 FVC: 86.48 % (ref 67.78–89.61)
POST FEV1: 2.37 L (ref 1.95–3.14)
POST FVC: 2.74 L (ref 2.47–4.03)
POST PEF: 4.55 L/S (ref 4.74–8.16)
PRE DLCO: 17.08 ML/(MIN*MMHG) (ref 17.45–28.92)
PRE ERV: 0.94 L (ref -16449.16–16450.84)
PRE FEF 25 75: 3.67 L/S (ref 1.24–3.86)
PRE FET 100: 5.5 SEC
PRE FEV1 FVC: 86.77 % (ref 67.78–89.61)
PRE FEV1: 2.45 L (ref 1.95–3.14)
PRE FRC PL: 2.15 L (ref 1.88–3.52)
PRE FVC: 2.82 L (ref 2.47–4.03)
PRE MVV: 108.11 L/MIN (ref 81.47–110.22)
PRE PEF: 7.33 L/S (ref 4.74–8.16)
PRE RV: 1.21 L (ref 1.28–2.43)
PRE TLC: 4.03 L (ref 3.95–5.93)
RAW LLN: 3.06
RAW PRE REF: 131.5 %
RAW PRE: 4.02 CMH2O*S/L (ref 3.06–3.06)
RAW REF: 3.06
RV LLN: 1.28
RV PRE REF: 65.3 %
RV REF: 1.85
RVTLC LLN: 29
RVTLC PRE REF: 78.4 %
RVTLC PRE: 30.05 % (ref 28.75–47.93)
RVTLC REF: 38
TLC LLN: 3.95
TLC PRE REF: 81.6 %
TLC REF: 4.94
VA PRE: 3.77 L (ref 4.79–4.79)
VA SINGLE BREATH LLN: 4.79
VA SINGLE BREATH PRE REF: 78.7 %
VA SINGLE BREATH REF: 4.79
VC LLN: 2.47
VC PRE REF: 87.2 %
VC PRE: 2.82 L (ref 2.47–4.03)
VC REF: 3.23

## 2021-06-10 PROCEDURE — 94729 PR C02/MEMBANE DIFFUSE CAPACITY: ICD-10-PCS | Mod: S$GLB,,, | Performed by: INTERNAL MEDICINE

## 2021-06-10 PROCEDURE — 94729 DIFFUSING CAPACITY: CPT | Mod: S$GLB,,, | Performed by: INTERNAL MEDICINE

## 2021-06-10 PROCEDURE — 99214 PR OFFICE/OUTPT VISIT, EST, LEVL IV, 30-39 MIN: ICD-10-PCS | Mod: 25,S$GLB,, | Performed by: NURSE PRACTITIONER

## 2021-06-10 PROCEDURE — 99999 PR PBB SHADOW E&M-EST. PATIENT-LVL III: ICD-10-PCS | Mod: PBBFAC,,, | Performed by: NURSE PRACTITIONER

## 2021-06-10 PROCEDURE — 99999 PR PBB SHADOW E&M-EST. PATIENT-LVL III: CPT | Mod: PBBFAC,,, | Performed by: NURSE PRACTITIONER

## 2021-06-10 PROCEDURE — 94060 PR EVAL OF BRONCHOSPASM: ICD-10-PCS | Mod: S$GLB,,, | Performed by: INTERNAL MEDICINE

## 2021-06-10 PROCEDURE — 3008F PR BODY MASS INDEX (BMI) DOCUMENTED: ICD-10-PCS | Mod: CPTII,S$GLB,, | Performed by: NURSE PRACTITIONER

## 2021-06-10 PROCEDURE — 94726 PLETHYSMOGRAPHY LUNG VOLUMES: CPT | Mod: S$GLB,,, | Performed by: INTERNAL MEDICINE

## 2021-06-10 PROCEDURE — 3008F BODY MASS INDEX DOCD: CPT | Mod: CPTII,S$GLB,, | Performed by: NURSE PRACTITIONER

## 2021-06-10 PROCEDURE — 94060 EVALUATION OF WHEEZING: CPT | Mod: S$GLB,,, | Performed by: INTERNAL MEDICINE

## 2021-06-10 PROCEDURE — 99214 OFFICE O/P EST MOD 30 MIN: CPT | Mod: 25,S$GLB,, | Performed by: NURSE PRACTITIONER

## 2021-06-10 PROCEDURE — 94726 PULM FUNCT TST PLETHYSMOGRAP: ICD-10-PCS | Mod: S$GLB,,, | Performed by: INTERNAL MEDICINE

## 2021-06-12 ENCOUNTER — PATIENT MESSAGE (OUTPATIENT)
Dept: FAMILY MEDICINE | Facility: CLINIC | Age: 56
End: 2021-06-12

## 2021-07-02 ENCOUNTER — PATIENT MESSAGE (OUTPATIENT)
Dept: FAMILY MEDICINE | Facility: CLINIC | Age: 56
End: 2021-07-02

## 2021-07-02 DIAGNOSIS — F32.A ANXIETY AND DEPRESSION: ICD-10-CM

## 2021-07-02 DIAGNOSIS — F41.9 ANXIETY AND DEPRESSION: ICD-10-CM

## 2021-07-02 RX ORDER — FLUOXETINE HYDROCHLORIDE 40 MG/1
CAPSULE ORAL
Qty: 180 CAPSULE | Refills: 2 | Status: SHIPPED | OUTPATIENT
Start: 2021-07-02 | End: 2022-07-01

## 2021-07-06 ENCOUNTER — PATIENT MESSAGE (OUTPATIENT)
Dept: PULMONOLOGY | Facility: CLINIC | Age: 56
End: 2021-07-06

## 2021-07-12 ENCOUNTER — PATIENT MESSAGE (OUTPATIENT)
Dept: FAMILY MEDICINE | Facility: CLINIC | Age: 56
End: 2021-07-12

## 2021-07-12 RX ORDER — SULFAMETHOXAZOLE AND TRIMETHOPRIM 800; 160 MG/1; MG/1
1 TABLET ORAL 2 TIMES DAILY
Qty: 14 TABLET | Refills: 0 | Status: SHIPPED | OUTPATIENT
Start: 2021-07-12 | End: 2021-07-19

## 2021-07-28 ENCOUNTER — PATIENT MESSAGE (OUTPATIENT)
Dept: SLEEP MEDICINE | Facility: CLINIC | Age: 56
End: 2021-07-28

## 2021-08-04 ENCOUNTER — LAB VISIT (OUTPATIENT)
Dept: LAB | Facility: HOSPITAL | Age: 56
End: 2021-08-04
Attending: INTERNAL MEDICINE
Payer: COMMERCIAL

## 2021-08-04 DIAGNOSIS — N18.31 STAGE 3A CHRONIC KIDNEY DISEASE: ICD-10-CM

## 2021-08-04 LAB
ANION GAP SERPL CALC-SCNC: 5 MMOL/L (ref 8–16)
BUN SERPL-MCNC: 16 MG/DL (ref 6–20)
CALCIUM SERPL-MCNC: 9.2 MG/DL (ref 8.7–10.5)
CHLORIDE SERPL-SCNC: 106 MMOL/L (ref 95–110)
CO2 SERPL-SCNC: 31 MMOL/L (ref 23–29)
CREAT SERPL-MCNC: 1.1 MG/DL (ref 0.5–1.4)
EST. GFR  (AFRICAN AMERICAN): >60 ML/MIN/1.73 M^2
EST. GFR  (NON AFRICAN AMERICAN): 55.5 ML/MIN/1.73 M^2
GLUCOSE SERPL-MCNC: 76 MG/DL (ref 70–110)
POTASSIUM SERPL-SCNC: 4.3 MMOL/L (ref 3.5–5.1)
SODIUM SERPL-SCNC: 142 MMOL/L (ref 136–145)

## 2021-08-04 PROCEDURE — 36415 COLL VENOUS BLD VENIPUNCTURE: CPT | Mod: PO | Performed by: INTERNAL MEDICINE

## 2021-08-04 PROCEDURE — 80048 BASIC METABOLIC PNL TOTAL CA: CPT | Performed by: INTERNAL MEDICINE

## 2021-08-18 ENCOUNTER — OFFICE VISIT (OUTPATIENT)
Dept: FAMILY MEDICINE | Facility: CLINIC | Age: 56
End: 2021-08-18
Payer: COMMERCIAL

## 2021-08-18 VITALS
WEIGHT: 209.44 LBS | OXYGEN SATURATION: 95 % | HEART RATE: 83 BPM | BODY MASS INDEX: 35.76 KG/M2 | TEMPERATURE: 98 F | DIASTOLIC BLOOD PRESSURE: 78 MMHG | RESPIRATION RATE: 18 BRPM | SYSTOLIC BLOOD PRESSURE: 110 MMHG | HEIGHT: 64 IN

## 2021-08-18 DIAGNOSIS — G47.33 OSA (OBSTRUCTIVE SLEEP APNEA): ICD-10-CM

## 2021-08-18 DIAGNOSIS — F41.9 ANXIETY AND DEPRESSION: ICD-10-CM

## 2021-08-18 DIAGNOSIS — E03.9 ACQUIRED HYPOTHYROIDISM: ICD-10-CM

## 2021-08-18 DIAGNOSIS — F32.A ANXIETY AND DEPRESSION: ICD-10-CM

## 2021-08-18 DIAGNOSIS — E55.9 VITAMIN D DEFICIENCY: ICD-10-CM

## 2021-08-18 DIAGNOSIS — J45.990 ASTHMA, EXERCISE INDUCED: ICD-10-CM

## 2021-08-18 DIAGNOSIS — E78.49 OTHER HYPERLIPIDEMIA: ICD-10-CM

## 2021-08-18 DIAGNOSIS — Z29.9 PREVENTIVE MEASURE: ICD-10-CM

## 2021-08-18 DIAGNOSIS — N18.31 STAGE 3A CHRONIC KIDNEY DISEASE: Primary | ICD-10-CM

## 2021-08-18 PROCEDURE — 3008F BODY MASS INDEX DOCD: CPT | Mod: CPTII,S$GLB,, | Performed by: INTERNAL MEDICINE

## 2021-08-18 PROCEDURE — 99999 PR PBB SHADOW E&M-EST. PATIENT-LVL IV: ICD-10-PCS | Mod: PBBFAC,,, | Performed by: INTERNAL MEDICINE

## 2021-08-18 PROCEDURE — 3044F PR MOST RECENT HEMOGLOBIN A1C LEVEL <7.0%: ICD-10-PCS | Mod: CPTII,S$GLB,, | Performed by: INTERNAL MEDICINE

## 2021-08-18 PROCEDURE — 99214 OFFICE O/P EST MOD 30 MIN: CPT | Mod: S$GLB,,, | Performed by: INTERNAL MEDICINE

## 2021-08-18 PROCEDURE — 99214 PR OFFICE/OUTPT VISIT, EST, LEVL IV, 30-39 MIN: ICD-10-PCS | Mod: S$GLB,,, | Performed by: INTERNAL MEDICINE

## 2021-08-18 PROCEDURE — 1159F PR MEDICATION LIST DOCUMENTED IN MEDICAL RECORD: ICD-10-PCS | Mod: CPTII,S$GLB,, | Performed by: INTERNAL MEDICINE

## 2021-08-18 PROCEDURE — 3074F PR MOST RECENT SYSTOLIC BLOOD PRESSURE < 130 MM HG: ICD-10-PCS | Mod: CPTII,S$GLB,, | Performed by: INTERNAL MEDICINE

## 2021-08-18 PROCEDURE — 1160F PR REVIEW ALL MEDS BY PRESCRIBER/CLIN PHARMACIST DOCUMENTED: ICD-10-PCS | Mod: CPTII,S$GLB,, | Performed by: INTERNAL MEDICINE

## 2021-08-18 PROCEDURE — 3008F PR BODY MASS INDEX (BMI) DOCUMENTED: ICD-10-PCS | Mod: CPTII,S$GLB,, | Performed by: INTERNAL MEDICINE

## 2021-08-18 PROCEDURE — 3078F DIAST BP <80 MM HG: CPT | Mod: CPTII,S$GLB,, | Performed by: INTERNAL MEDICINE

## 2021-08-18 PROCEDURE — 1160F RVW MEDS BY RX/DR IN RCRD: CPT | Mod: CPTII,S$GLB,, | Performed by: INTERNAL MEDICINE

## 2021-08-18 PROCEDURE — 3044F HG A1C LEVEL LT 7.0%: CPT | Mod: CPTII,S$GLB,, | Performed by: INTERNAL MEDICINE

## 2021-08-18 PROCEDURE — 3078F PR MOST RECENT DIASTOLIC BLOOD PRESSURE < 80 MM HG: ICD-10-PCS | Mod: CPTII,S$GLB,, | Performed by: INTERNAL MEDICINE

## 2021-08-18 PROCEDURE — 1126F PR PAIN SEVERITY QUANTIFIED, NO PAIN PRESENT: ICD-10-PCS | Mod: CPTII,S$GLB,, | Performed by: INTERNAL MEDICINE

## 2021-08-18 PROCEDURE — 3074F SYST BP LT 130 MM HG: CPT | Mod: CPTII,S$GLB,, | Performed by: INTERNAL MEDICINE

## 2021-08-18 PROCEDURE — 1126F AMNT PAIN NOTED NONE PRSNT: CPT | Mod: CPTII,S$GLB,, | Performed by: INTERNAL MEDICINE

## 2021-08-18 PROCEDURE — 99999 PR PBB SHADOW E&M-EST. PATIENT-LVL IV: CPT | Mod: PBBFAC,,, | Performed by: INTERNAL MEDICINE

## 2021-08-18 PROCEDURE — 1159F MED LIST DOCD IN RCRD: CPT | Mod: CPTII,S$GLB,, | Performed by: INTERNAL MEDICINE

## 2021-08-24 ENCOUNTER — TELEPHONE (OUTPATIENT)
Dept: RADIOLOGY | Facility: HOSPITAL | Age: 56
End: 2021-08-24

## 2021-08-25 ENCOUNTER — PATIENT MESSAGE (OUTPATIENT)
Dept: FAMILY MEDICINE | Facility: CLINIC | Age: 56
End: 2021-08-25

## 2021-08-25 ENCOUNTER — HOSPITAL ENCOUNTER (OUTPATIENT)
Dept: RADIOLOGY | Facility: HOSPITAL | Age: 56
Discharge: HOME | End: 2021-08-25
Attending: INTERNAL MEDICINE
Payer: COMMERCIAL

## 2021-08-25 DIAGNOSIS — N28.1 KIDNEY CYST, ACQUIRED: Primary | ICD-10-CM

## 2021-08-25 DIAGNOSIS — N18.31 STAGE 3A CHRONIC KIDNEY DISEASE: ICD-10-CM

## 2021-08-25 PROCEDURE — 76770 US EXAM ABDO BACK WALL COMP: CPT | Mod: TC

## 2021-08-25 PROCEDURE — 76770 US RETROPERITONEAL COMPLETE: ICD-10-PCS | Mod: 26,,, | Performed by: RADIOLOGY

## 2021-08-25 PROCEDURE — 76770 US EXAM ABDO BACK WALL COMP: CPT | Mod: 26,,, | Performed by: RADIOLOGY

## 2021-08-31 ENCOUNTER — OFFICE VISIT (OUTPATIENT)
Dept: UROLOGY | Facility: CLINIC | Age: 56
End: 2021-08-31
Payer: COMMERCIAL

## 2021-08-31 VITALS
DIASTOLIC BLOOD PRESSURE: 75 MMHG | BODY MASS INDEX: 38.91 KG/M2 | HEIGHT: 64 IN | WEIGHT: 227.94 LBS | HEART RATE: 84 BPM | SYSTOLIC BLOOD PRESSURE: 112 MMHG

## 2021-08-31 DIAGNOSIS — N28.1 KIDNEY CYST, ACQUIRED: ICD-10-CM

## 2021-08-31 PROCEDURE — 3074F PR MOST RECENT SYSTOLIC BLOOD PRESSURE < 130 MM HG: ICD-10-PCS | Mod: CPTII,S$GLB,, | Performed by: UROLOGY

## 2021-08-31 PROCEDURE — 3008F BODY MASS INDEX DOCD: CPT | Mod: CPTII,S$GLB,, | Performed by: UROLOGY

## 2021-08-31 PROCEDURE — 99999 PR PBB SHADOW E&M-EST. PATIENT-LVL IV: CPT | Mod: PBBFAC,,, | Performed by: UROLOGY

## 2021-08-31 PROCEDURE — 99999 PR PBB SHADOW E&M-EST. PATIENT-LVL IV: ICD-10-PCS | Mod: PBBFAC,,, | Performed by: UROLOGY

## 2021-08-31 PROCEDURE — 1160F PR REVIEW ALL MEDS BY PRESCRIBER/CLIN PHARMACIST DOCUMENTED: ICD-10-PCS | Mod: CPTII,S$GLB,, | Performed by: UROLOGY

## 2021-08-31 PROCEDURE — 1160F RVW MEDS BY RX/DR IN RCRD: CPT | Mod: CPTII,S$GLB,, | Performed by: UROLOGY

## 2021-08-31 PROCEDURE — 3044F HG A1C LEVEL LT 7.0%: CPT | Mod: CPTII,S$GLB,, | Performed by: UROLOGY

## 2021-08-31 PROCEDURE — 99203 OFFICE O/P NEW LOW 30 MIN: CPT | Mod: S$GLB,,, | Performed by: UROLOGY

## 2021-08-31 PROCEDURE — 3008F PR BODY MASS INDEX (BMI) DOCUMENTED: ICD-10-PCS | Mod: CPTII,S$GLB,, | Performed by: UROLOGY

## 2021-08-31 PROCEDURE — 3078F DIAST BP <80 MM HG: CPT | Mod: CPTII,S$GLB,, | Performed by: UROLOGY

## 2021-08-31 PROCEDURE — 3044F PR MOST RECENT HEMOGLOBIN A1C LEVEL <7.0%: ICD-10-PCS | Mod: CPTII,S$GLB,, | Performed by: UROLOGY

## 2021-08-31 PROCEDURE — 1159F PR MEDICATION LIST DOCUMENTED IN MEDICAL RECORD: ICD-10-PCS | Mod: CPTII,S$GLB,, | Performed by: UROLOGY

## 2021-08-31 PROCEDURE — 99203 PR OFFICE/OUTPT VISIT, NEW, LEVL III, 30-44 MIN: ICD-10-PCS | Mod: S$GLB,,, | Performed by: UROLOGY

## 2021-08-31 PROCEDURE — 3074F SYST BP LT 130 MM HG: CPT | Mod: CPTII,S$GLB,, | Performed by: UROLOGY

## 2021-08-31 PROCEDURE — 3078F PR MOST RECENT DIASTOLIC BLOOD PRESSURE < 80 MM HG: ICD-10-PCS | Mod: CPTII,S$GLB,, | Performed by: UROLOGY

## 2021-08-31 PROCEDURE — 1159F MED LIST DOCD IN RCRD: CPT | Mod: CPTII,S$GLB,, | Performed by: UROLOGY

## 2021-09-14 ENCOUNTER — PATIENT MESSAGE (OUTPATIENT)
Dept: SLEEP MEDICINE | Facility: CLINIC | Age: 56
End: 2021-09-14

## 2021-10-06 ENCOUNTER — PATIENT MESSAGE (OUTPATIENT)
Dept: FAMILY MEDICINE | Facility: CLINIC | Age: 56
End: 2021-10-06

## 2021-10-08 ENCOUNTER — PATIENT MESSAGE (OUTPATIENT)
Dept: FAMILY MEDICINE | Facility: CLINIC | Age: 56
End: 2021-10-08

## 2021-10-08 DIAGNOSIS — R82.90 URINE MALODOR: Primary | ICD-10-CM

## 2021-10-12 ENCOUNTER — LAB VISIT (OUTPATIENT)
Dept: LAB | Facility: HOSPITAL | Age: 56
End: 2021-10-12
Payer: COMMERCIAL

## 2021-10-12 DIAGNOSIS — R82.90 URINE MALODOR: ICD-10-CM

## 2021-10-12 LAB
BILIRUB UR QL STRIP: NEGATIVE
CLARITY UR REFRACT.AUTO: ABNORMAL
COLOR UR AUTO: YELLOW
GLUCOSE UR QL STRIP: NEGATIVE
HGB UR QL STRIP: NEGATIVE
KETONES UR QL STRIP: NEGATIVE
LEUKOCYTE ESTERASE UR QL STRIP: NEGATIVE
NITRITE UR QL STRIP: NEGATIVE
PH UR STRIP: 5 [PH] (ref 5–8)
PROT UR QL STRIP: NEGATIVE
SP GR UR STRIP: 1.02 (ref 1–1.03)
URN SPEC COLLECT METH UR: ABNORMAL

## 2021-10-12 PROCEDURE — 81003 URINALYSIS AUTO W/O SCOPE: CPT | Performed by: INTERNAL MEDICINE

## 2021-10-12 PROCEDURE — 87086 URINE CULTURE/COLONY COUNT: CPT | Performed by: INTERNAL MEDICINE

## 2021-10-14 LAB
BACTERIA UR CULT: NORMAL
BACTERIA UR CULT: NORMAL

## 2021-10-19 ENCOUNTER — PATIENT MESSAGE (OUTPATIENT)
Dept: PULMONOLOGY | Facility: CLINIC | Age: 56
End: 2021-10-19
Payer: COMMERCIAL

## 2021-12-09 ENCOUNTER — PATIENT MESSAGE (OUTPATIENT)
Dept: FAMILY MEDICINE | Facility: CLINIC | Age: 56
End: 2021-12-09
Payer: COMMERCIAL

## 2021-12-13 ENCOUNTER — PATIENT MESSAGE (OUTPATIENT)
Dept: FAMILY MEDICINE | Facility: CLINIC | Age: 56
End: 2021-12-13
Payer: COMMERCIAL

## 2021-12-23 ENCOUNTER — TELEPHONE (OUTPATIENT)
Dept: FAMILY MEDICINE | Facility: CLINIC | Age: 56
End: 2021-12-23
Payer: COMMERCIAL

## 2021-12-23 ENCOUNTER — LAB VISIT (OUTPATIENT)
Dept: LAB | Facility: HOSPITAL | Age: 56
End: 2021-12-23
Attending: INTERNAL MEDICINE
Payer: COMMERCIAL

## 2021-12-23 DIAGNOSIS — E55.9 VITAMIN D DEFICIENCY: ICD-10-CM

## 2021-12-23 DIAGNOSIS — Z29.9 PREVENTIVE MEASURE: ICD-10-CM

## 2021-12-23 LAB
25(OH)D3+25(OH)D2 SERPL-MCNC: 48 NG/ML (ref 30–96)
ALBUMIN SERPL BCP-MCNC: 3.3 G/DL (ref 3.5–5.2)
ALP SERPL-CCNC: 103 U/L (ref 55–135)
ALT SERPL W/O P-5'-P-CCNC: 17 U/L (ref 10–44)
ANION GAP SERPL CALC-SCNC: 8 MMOL/L (ref 8–16)
AST SERPL-CCNC: 19 U/L (ref 10–40)
BILIRUB SERPL-MCNC: 0.4 MG/DL (ref 0.1–1)
BUN SERPL-MCNC: 18 MG/DL (ref 6–20)
CALCIUM SERPL-MCNC: 9.4 MG/DL (ref 8.7–10.5)
CHLORIDE SERPL-SCNC: 103 MMOL/L (ref 95–110)
CHOLEST SERPL-MCNC: 202 MG/DL (ref 120–199)
CHOLEST/HDLC SERPL: 2.8 {RATIO} (ref 2–5)
CO2 SERPL-SCNC: 30 MMOL/L (ref 23–29)
CREAT SERPL-MCNC: 1.1 MG/DL (ref 0.5–1.4)
EST. GFR  (AFRICAN AMERICAN): >60 ML/MIN/1.73 M^2
EST. GFR  (NON AFRICAN AMERICAN): 55.5 ML/MIN/1.73 M^2
GLUCOSE SERPL-MCNC: 90 MG/DL (ref 70–110)
HDLC SERPL-MCNC: 73 MG/DL (ref 40–75)
HDLC SERPL: 36.1 % (ref 20–50)
LDLC SERPL CALC-MCNC: 107.6 MG/DL (ref 63–159)
NONHDLC SERPL-MCNC: 129 MG/DL
POTASSIUM SERPL-SCNC: 4.7 MMOL/L (ref 3.5–5.1)
PROT SERPL-MCNC: 6.7 G/DL (ref 6–8.4)
SODIUM SERPL-SCNC: 141 MMOL/L (ref 136–145)
TRIGL SERPL-MCNC: 107 MG/DL (ref 30–150)
TSH SERPL DL<=0.005 MIU/L-ACNC: 2.19 UIU/ML (ref 0.4–4)

## 2021-12-23 PROCEDURE — 82306 VITAMIN D 25 HYDROXY: CPT | Performed by: INTERNAL MEDICINE

## 2021-12-23 PROCEDURE — 80053 COMPREHEN METABOLIC PANEL: CPT | Performed by: INTERNAL MEDICINE

## 2021-12-23 PROCEDURE — 85025 COMPLETE CBC W/AUTO DIFF WBC: CPT | Performed by: INTERNAL MEDICINE

## 2021-12-23 PROCEDURE — 84443 ASSAY THYROID STIM HORMONE: CPT | Performed by: INTERNAL MEDICINE

## 2021-12-23 PROCEDURE — 36415 COLL VENOUS BLD VENIPUNCTURE: CPT | Mod: PO | Performed by: INTERNAL MEDICINE

## 2021-12-23 PROCEDURE — 80061 LIPID PANEL: CPT | Performed by: INTERNAL MEDICINE

## 2021-12-24 LAB
BASOPHILS # BLD AUTO: 0.02 K/UL (ref 0–0.2)
BASOPHILS NFR BLD: 0.3 % (ref 0–1.9)
DIFFERENTIAL METHOD: ABNORMAL
EOSINOPHIL # BLD AUTO: 0.1 K/UL (ref 0–0.5)
EOSINOPHIL NFR BLD: 1.6 % (ref 0–8)
ERYTHROCYTE [DISTWIDTH] IN BLOOD BY AUTOMATED COUNT: 13.2 % (ref 11.5–14.5)
HCT VFR BLD AUTO: 42.7 % (ref 37–48.5)
HGB BLD-MCNC: 13.6 G/DL (ref 12–16)
IMM GRANULOCYTES # BLD AUTO: 0.02 K/UL (ref 0–0.04)
IMM GRANULOCYTES NFR BLD AUTO: 0.3 % (ref 0–0.5)
LYMPHOCYTES # BLD AUTO: 1.3 K/UL (ref 1–4.8)
LYMPHOCYTES NFR BLD: 21.4 % (ref 18–48)
MCH RBC QN AUTO: 30.6 PG (ref 27–31)
MCHC RBC AUTO-ENTMCNC: 31.9 G/DL (ref 32–36)
MCV RBC AUTO: 96 FL (ref 82–98)
MONOCYTES # BLD AUTO: 0.5 K/UL (ref 0.3–1)
MONOCYTES NFR BLD: 8.4 % (ref 4–15)
NEUTROPHILS # BLD AUTO: 4.1 K/UL (ref 1.8–7.7)
NEUTROPHILS NFR BLD: 68 % (ref 38–73)
NRBC BLD-RTO: 0 /100 WBC
PLATELET # BLD AUTO: 185 K/UL (ref 150–450)
PMV BLD AUTO: 10 FL (ref 9.2–12.9)
RBC # BLD AUTO: 4.44 M/UL (ref 4–5.4)
WBC # BLD AUTO: 6.07 K/UL (ref 3.9–12.7)

## 2021-12-26 ENCOUNTER — PATIENT MESSAGE (OUTPATIENT)
Dept: FAMILY MEDICINE | Facility: CLINIC | Age: 56
End: 2021-12-26
Payer: COMMERCIAL

## 2021-12-27 ENCOUNTER — PATIENT MESSAGE (OUTPATIENT)
Dept: FAMILY MEDICINE | Facility: CLINIC | Age: 56
End: 2021-12-27
Payer: COMMERCIAL

## 2022-02-17 ENCOUNTER — PATIENT MESSAGE (OUTPATIENT)
Dept: RESEARCH | Facility: HOSPITAL | Age: 57
End: 2022-02-17
Payer: COMMERCIAL

## 2022-04-14 DIAGNOSIS — Z12.31 OTHER SCREENING MAMMOGRAM: ICD-10-CM

## 2022-04-26 ENCOUNTER — PATIENT MESSAGE (OUTPATIENT)
Dept: ADMINISTRATIVE | Facility: HOSPITAL | Age: 57
End: 2022-04-26
Payer: COMMERCIAL

## 2022-07-01 DIAGNOSIS — F41.9 ANXIETY AND DEPRESSION: ICD-10-CM

## 2022-07-01 DIAGNOSIS — F32.A ANXIETY AND DEPRESSION: ICD-10-CM

## 2022-07-01 RX ORDER — FLUOXETINE HYDROCHLORIDE 40 MG/1
CAPSULE ORAL
Qty: 180 CAPSULE | Refills: 0 | Status: SHIPPED | OUTPATIENT
Start: 2022-07-01 | End: 2022-09-27

## 2022-07-01 NOTE — TELEPHONE ENCOUNTER
Care Due:                  Date            Visit Type   Department     Provider  --------------------------------------------------------------------------------                                EP -                              PRIMARY      [facility] FAMILY    Alyce Rhodesally  Last Visit: 08-      CARE ([facility])   MEDICINE       Elda  Next Visit: None Scheduled  None         None Found                                                            Last  Test          Frequency    Reason                     Performed    Due Date  --------------------------------------------------------------------------------    Office Visit  12 months..  FLUoxetine, WIXELA,        08- 08-                             levothyroxine............    [employer] Embedded Care Gaps. Reference number: [id number]. 7/01/2022   9:34:12 AM CDT

## 2022-07-01 NOTE — TELEPHONE ENCOUNTER
Refill Decision Note   Taryn Serrano  is requesting a refill authorization.  Brief Assessment and Rationale for Refill:  Approve    -Medication-Related Problems Identified: Requires appointment  Medication Therapy Plan:       Medication Reconciliation Completed: No   Comments:     Provider Staff:     Action is required for this patient.   Please see care gap opportunities below in Care Due Message.     Thanks!  [facility]     Appointments      Date Provider   Last Visit   8/18/2021 Alyce Schroeder MD   Next Visit   Visit date not found Alyce Schroeder MD     Note composed:12:53 PM 07/01/2022           Note composed:12:53 PM 07/01/2022

## 2022-08-09 ENCOUNTER — PATIENT MESSAGE (OUTPATIENT)
Dept: ADMINISTRATIVE | Facility: HOSPITAL | Age: 57
End: 2022-08-09
Payer: COMMERCIAL

## 2023-02-09 ENCOUNTER — PATIENT OUTREACH (OUTPATIENT)
Dept: ADMINISTRATIVE | Facility: HOSPITAL | Age: 58
End: 2023-02-09
Payer: COMMERCIAL

## 2023-02-09 NOTE — PROGRESS NOTES
Attempted to contact patient by phone for a MAMMOGRAM SCHEDULING, was unable to leave voice mail message.  Updated Care Everywhere and Team.

## 2023-05-12 DIAGNOSIS — F41.9 ANXIETY AND DEPRESSION: ICD-10-CM

## 2023-05-12 DIAGNOSIS — F32.A ANXIETY AND DEPRESSION: ICD-10-CM

## 2023-05-12 RX ORDER — FLUOXETINE HYDROCHLORIDE 40 MG/1
CAPSULE ORAL
Qty: 180 CAPSULE | Refills: 0 | Status: SHIPPED | OUTPATIENT
Start: 2023-05-12 | End: 2024-02-15 | Stop reason: SDUPTHER

## 2023-05-12 NOTE — TELEPHONE ENCOUNTER
Refill Routing Note   Medication(s) are not appropriate for processing by [facility] for the following reason(s):      Responsible provider unclear    [facility] action(s):  Defer None identified          Appointments  past 12m or future 3m with PCP    Date Provider   Last Visit   8/18/2021 Alyce Schroeder MD   Next Visit   Visit date not found Alyce Schroeder MD   ED visits in past 90 days: 0        Note composed:7:38 AM 05/12/2023

## 2023-06-22 ENCOUNTER — PATIENT MESSAGE (OUTPATIENT)
Dept: PULMONOLOGY | Facility: CLINIC | Age: 58
End: 2023-06-22

## 2023-06-22 DIAGNOSIS — J45.990 ASTHMA, EXERCISE INDUCED: Primary | ICD-10-CM

## 2023-06-22 DIAGNOSIS — J45.909 ASTHMA, UNSPECIFIED ASTHMA SEVERITY, UNSPECIFIED WHETHER COMPLICATED, UNSPECIFIED WHETHER PERSISTENT: ICD-10-CM

## 2023-06-22 DIAGNOSIS — Z86.16 HISTORY OF 2019 NOVEL CORONAVIRUS DISEASE (COVID-19): ICD-10-CM

## 2023-06-22 DIAGNOSIS — G93.31 POST VIRAL SYNDROME: ICD-10-CM

## 2023-06-22 RX ORDER — FLUTICASONE FUROATE, UMECLIDINIUM BROMIDE AND VILANTEROL TRIFENATATE 200; 62.5; 25 UG/1; UG/1; UG/1
POWDER RESPIRATORY (INHALATION)
Qty: 60 EACH | Refills: 11 | OUTPATIENT
Start: 2023-06-22

## 2023-09-18 ENCOUNTER — OFFICE VISIT (OUTPATIENT)
Dept: PULMONOLOGY | Facility: CLINIC | Age: 58
End: 2023-09-18
Payer: COMMERCIAL

## 2023-09-18 ENCOUNTER — CLINICAL SUPPORT (OUTPATIENT)
Dept: PULMONOLOGY | Facility: CLINIC | Age: 58
End: 2023-09-18
Payer: COMMERCIAL

## 2023-09-18 VITALS
HEIGHT: 64 IN | HEART RATE: 62 BPM | SYSTOLIC BLOOD PRESSURE: 110 MMHG | BODY MASS INDEX: 35.87 KG/M2 | DIASTOLIC BLOOD PRESSURE: 62 MMHG | RESPIRATION RATE: 20 BRPM | WEIGHT: 210.13 LBS

## 2023-09-18 DIAGNOSIS — E66.9 OBESITY, CLASS II, BMI 35-39.9, NO COMORBIDITY: ICD-10-CM

## 2023-09-18 DIAGNOSIS — J45.990 ASTHMA, EXERCISE INDUCED: ICD-10-CM

## 2023-09-18 DIAGNOSIS — G47.33 OSA (OBSTRUCTIVE SLEEP APNEA): Primary | ICD-10-CM

## 2023-09-18 LAB
BRPFT: NORMAL
FEF 25 75 CHG: -46.6 %
FEF 25 75 LLN: 1.16
FEF 25 75 POST REF: 62.8 %
FEF 25 75 PRE REF: 117.5 %
FEF 25 75 REF: 2.27
FET100 CHG: -1.5 %
FEV1 CHG: -10.1 %
FEV1 FVC CHG: -8.1 %
FEV1 FVC LLN: 67
FEV1 FVC POST REF: 92.8 %
FEV1 FVC PRE REF: 101 %
FEV1 FVC REF: 79
FEV1 LLN: 1.89
FEV1 POST REF: 77.7 %
FEV1 PRE REF: 86.4 %
FEV1 REF: 2.51
FVC CHG: -2.1 %
FVC LLN: 2.41
FVC POST REF: 83.2 %
FVC PRE REF: 85 %
FVC REF: 3.18
PEF CHG: -14.9 %
PEF LLN: 4.6
PEF POST REF: 73.1 %
PEF PRE REF: 85.9 %
PEF REF: 6.32
POST FEF 25 75: 1.43 L/S (ref 1.16–3.38)
POST FET 100: 7.34 SEC
POST FEV1 FVC: 73.59 % (ref 67.25–91.33)
POST FEV1: 1.95 L (ref 1.89–3.12)
POST FVC: 2.65 L (ref 2.41–3.95)
POST PEF: 4.62 L/S (ref 4.6–8.04)
PRE FEF 25 75: 2.67 L/S (ref 1.16–3.38)
PRE FET 100: 7.46 SEC
PRE FEV1 FVC: 80.1 % (ref 67.25–91.33)
PRE FEV1: 2.17 L (ref 1.89–3.12)
PRE FVC: 2.7 L (ref 2.41–3.95)
PRE PEF: 5.43 L/S (ref 4.6–8.04)

## 2023-09-18 PROCEDURE — 1159F MED LIST DOCD IN RCRD: CPT | Mod: CPTII,S$GLB,, | Performed by: NURSE PRACTITIONER

## 2023-09-18 PROCEDURE — 99999 PR PBB SHADOW E&M-EST. PATIENT-LVL IV: CPT | Mod: PBBFAC,,, | Performed by: NURSE PRACTITIONER

## 2023-09-18 PROCEDURE — 3078F PR MOST RECENT DIASTOLIC BLOOD PRESSURE < 80 MM HG: ICD-10-PCS | Mod: CPTII,S$GLB,, | Performed by: NURSE PRACTITIONER

## 2023-09-18 PROCEDURE — 3008F PR BODY MASS INDEX (BMI) DOCUMENTED: ICD-10-PCS | Mod: CPTII,S$GLB,, | Performed by: NURSE PRACTITIONER

## 2023-09-18 PROCEDURE — 3078F DIAST BP <80 MM HG: CPT | Mod: CPTII,S$GLB,, | Performed by: NURSE PRACTITIONER

## 2023-09-18 PROCEDURE — 1160F RVW MEDS BY RX/DR IN RCRD: CPT | Mod: CPTII,S$GLB,, | Performed by: NURSE PRACTITIONER

## 2023-09-18 PROCEDURE — 99999 PR PBB SHADOW E&M-EST. PATIENT-LVL I: CPT | Mod: PBBFAC,,,

## 2023-09-18 PROCEDURE — 3074F SYST BP LT 130 MM HG: CPT | Mod: CPTII,S$GLB,, | Performed by: NURSE PRACTITIONER

## 2023-09-18 PROCEDURE — 1159F PR MEDICATION LIST DOCUMENTED IN MEDICAL RECORD: ICD-10-PCS | Mod: CPTII,S$GLB,, | Performed by: NURSE PRACTITIONER

## 2023-09-18 PROCEDURE — 1160F PR REVIEW ALL MEDS BY PRESCRIBER/CLIN PHARMACIST DOCUMENTED: ICD-10-PCS | Mod: CPTII,S$GLB,, | Performed by: NURSE PRACTITIONER

## 2023-09-18 PROCEDURE — 3008F BODY MASS INDEX DOCD: CPT | Mod: CPTII,S$GLB,, | Performed by: NURSE PRACTITIONER

## 2023-09-18 PROCEDURE — 94060 EVALUATION OF WHEEZING: CPT | Mod: S$GLB,,, | Performed by: INTERNAL MEDICINE

## 2023-09-18 PROCEDURE — 99214 PR OFFICE/OUTPT VISIT, EST, LEVL IV, 30-39 MIN: ICD-10-PCS | Mod: 25,S$GLB,, | Performed by: NURSE PRACTITIONER

## 2023-09-18 PROCEDURE — 99999 PR PBB SHADOW E&M-EST. PATIENT-LVL IV: ICD-10-PCS | Mod: PBBFAC,,, | Performed by: NURSE PRACTITIONER

## 2023-09-18 PROCEDURE — 94060 PR EVAL OF BRONCHOSPASM: ICD-10-PCS | Mod: S$GLB,,, | Performed by: INTERNAL MEDICINE

## 2023-09-18 PROCEDURE — 99214 OFFICE O/P EST MOD 30 MIN: CPT | Mod: 25,S$GLB,, | Performed by: NURSE PRACTITIONER

## 2023-09-18 PROCEDURE — 3074F PR MOST RECENT SYSTOLIC BLOOD PRESSURE < 130 MM HG: ICD-10-PCS | Mod: CPTII,S$GLB,, | Performed by: NURSE PRACTITIONER

## 2023-09-18 PROCEDURE — 99999 PR PBB SHADOW E&M-EST. PATIENT-LVL I: ICD-10-PCS | Mod: PBBFAC,,,

## 2023-09-18 RX ORDER — ALBUTEROL SULFATE 90 UG/1
1 AEROSOL, METERED RESPIRATORY (INHALATION) EVERY 6 HOURS PRN
COMMUNITY
Start: 2022-11-21

## 2023-09-18 RX ORDER — CELECOXIB 200 MG/1
200 CAPSULE ORAL
COMMUNITY
Start: 2023-09-05

## 2023-09-18 RX ORDER — BUPROPION HYDROCHLORIDE 300 MG/1
300 TABLET ORAL
COMMUNITY
Start: 2023-09-14

## 2023-09-18 RX ORDER — ALBUTEROL SULFATE 90 UG/1
2 AEROSOL, METERED RESPIRATORY (INHALATION) EVERY 4 HOURS PRN
Qty: 18 G | Refills: 1 | Status: SHIPPED | OUTPATIENT
Start: 2023-09-18

## 2023-09-18 RX ORDER — BUPROPION HYDROCHLORIDE 300 MG/1
1 TABLET ORAL EVERY MORNING
COMMUNITY
Start: 2023-06-19 | End: 2024-06-18

## 2023-09-18 NOTE — PROGRESS NOTES
"Subjective:      Patient ID: Taryn Serrano is a 58 y.o. female.    Chief Complaint: Apnea    Apnea        Presents to office for review of AutoPAP therapy. Patient states improved symptoms with use of AutoPAP. Sleeping more soundly. Waking up feeling more refreshed. Improved daytime sleepiness. Patient states she is benefiting from use of the AutoPAP.   Asthma controlled with Trelegy daily. No recent use of albuterol.   She states is is interested in getting back into exercise and may use albuterol 20 min before.     Patient Active Problem List   Diagnosis    Circadian rhythm sleep disorder, irregular sleep-wake type    Paving stone degeneration of peripheral retina    Periodic limb movement disorder    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    Asthma, exercise induced    URBAN (obstructive sleep apnea)    Acquired hypothyroidism    Anxiety and depression    Vitamin D deficiency    Pseudophakia of both eyes    Dry eye    PCO (posterior capsular opacification)    Obesity, Class II, BMI 35-39.9, no comorbidity    Synovial cyst of left popliteal space    Left ACL tear    Tear of medial meniscus of left knee    Other hyperlipidemia    CKD (chronic kidney disease) stage 3, GFR 30-59 ml/min    Osteopenia    Bunion, left foot     /62   Pulse 62   Resp 20   Ht 5' 4" (1.626 m)   Wt 95.3 kg (210 lb 1.6 oz)   LMP 01/07/2015   BMI 36.06 kg/m²   Body mass index is 36.06 kg/m².    Review of Systems   Constitutional: Negative.    HENT: Negative.     Respiratory: Negative.     Cardiovascular: Negative.    Musculoskeletal: Negative.    Gastrointestinal: Negative.    Neurological: Negative.    Psychiatric/Behavioral: Negative.       Objective:      Physical Exam  Constitutional:       Appearance: She is well-developed. She is obese.   HENT:      Head: Normocephalic and atraumatic.      Nose: Nose normal.   Cardiovascular:      Rate and Rhythm: Normal rate and regular rhythm.      Heart sounds: No " murmur heard.     No gallop.   Pulmonary:      Effort: Pulmonary effort is normal.      Breath sounds: Normal breath sounds.   Abdominal:      Palpations: Abdomen is soft.      Tenderness: There is no abdominal tenderness.   Musculoskeletal:         General: Normal range of motion.      Cervical back: Normal range of motion and neck supple.   Skin:     General: Skin is warm and dry.   Neurological:      Mental Status: She is alert and oriented to person, place, and time.   Psychiatric:         Mood and Affect: Mood normal.         Behavior: Behavior normal.       Personal Diagnostic Review    Results for orders placed or performed in visit on 09/18/23   Spirometry with/without bronchodilator   Result Value Ref Range    Post FVC 2.65 2.41 - 3.95 L    Post FEV1 1.95 1.89 - 3.12 L    Post FEV1 FVC 73.59 67.25 - 91.33 %    Post FEF 25 75 1.43 1.16 - 3.38 L/s    Post PEF 4.62 4.60 - 8.04 L/s    Post  7.34 sec    Pre FVC 2.70 2.41 - 3.95 L    Pre FEV1 2.17 1.89 - 3.12 L    Pre FEV1 FVC 80.10 67.25 - 91.33 %    Pre FEF 25 75 2.67 1.16 - 3.38 L/s    Pre PEF 5.43 4.60 - 8.04 L/s    Pre  7.46 sec    FVC Ref 3.18     FVC LLN 2.41     FVC Pre Ref 85.0 %    FVC Post Ref 83.2 %    FVC Chg -2.1 %    FEV1 Ref 2.51     FEV1 LLN 1.89     FEV1 Pre Ref 86.4 %    FEV1 Post Ref 77.7 %    FEV1 Chg -10.1 %    FEV1 FVC Ref 79     FEV1 FVC LLN 67     FEV1 FVC Pre Ref 101.0 %    FEV1 FVC Post Ref 92.8 %    FEV1 FVC Chg -8.1 %    FEF 25 75 Ref 2.27     FEF 25 75 LLN 1.16     FEF 25 75 Pre Ref 117.5 %    FEF 25 75 Post Ref 62.8 %    FEF 25 75 Chg -46.6 %    PEF Ref 6.32     PEF LLN 4.60     PEF Pre Ref 85.9 %    PEF Post Ref 73.1 %    PEF Chg -14.9 %    FDY064 Chg -1.5 %     APAP download. Compliant. AHI 5.4      Assessment:       1. URBAN (obstructive sleep apnea)    2. Asthma, exercise induced    3. Obesity, Class II, BMI 35-39.9, no comorbidity    4. Asthma        Outpatient Encounter Medications as of 9/18/2023   Medication Sig  Dispense Refill    albuterol (PROVENTIL/VENTOLIN HFA) 90 mcg/actuation inhaler Inhale 1 puff into the lungs every 6 (six) hours as needed.      buPROPion (WELLBUTRIN XL) 300 MG 24 hr tablet Take 1 tablet by mouth every morning.      buPROPion (WELLBUTRIN XL) 300 MG 24 hr tablet Take 300 mg by mouth.      celecoxib (CELEBREX) 200 MG capsule Take 200 mg by mouth.      enoxaparin sodium (LOVENOX SUBQ) Lovenox Take No date recorded No form recorded No frequency recorded No route recorded No set duration recorded No set duration amount recorded active No dosage strength recorded No dosage strength units of measure recorded      FLUoxetine 40 MG capsule TAKE 2 CAPSULES BY MOUTH EVERY  capsule 0    levothyroxine (SYNTHROID) 112 MCG tablet TAKE 1 TABLET (112 MCG TOTAL) BY MOUTH ONCE DAILY. 90 tablet 3    TRELEGY ELLIPTA 200-62.5-25 mcg inhaler INHALE 1 PUFF INTO THE LUNGS ONCE DAILY. 60 each 11    [DISCONTINUED] albuterol (VENTOLIN HFA) 90 mcg/actuation inhaler Inhale 1-2 puffs into the lungs every 6 (six) hours as needed for Wheezing. RescueINHALE 1 TO 2 PUFFS BY MOUTH EVERY 6 HOURS AS NEEDED FOR WHEEZE (Patient taking differently: Inhale 1-2 puffs into the lungs every 6 (six) hours as needed for Wheezing (exercise induced). RescueINHALE 1 TO 2 PUFFS BY MOUTH EVERY 6 HOURS AS NEEDED FOR WHEEZE) 18 g 6    [DISCONTINUED] WIXELA INHUB 500-50 mcg/dose DsDv diskus inhaler TAKE 1 PUFF BY MOUTH TWICE A DAY 60 each 5    albuterol (VENTOLIN HFA) 90 mcg/actuation inhaler Inhale 2 puffs into the lungs every 4 (four) hours as needed for Wheezing. RescueINHALE 1 TO 2 PUFFS BY MOUTH EVERY 6 HOURS AS NEEDED FOR WHEEZE 18 g 1    [DISCONTINUED] cholecalciferol, vitamin D3, 2,000 unit Cap Take 1 capsule (2,000 Units total) by mouth once daily. (Patient taking differently: Take 1 capsule by mouth every other day. ) 100 capsule 6    [DISCONTINUED] turmeric root extract 500 mg Cap Take 1,000 capsules by mouth once daily. 100 capsule 6      Facility-Administered Encounter Medications as of 2023   Medication Dose Route Frequency Provider Last Rate Last Admin    ondansetron HCl (PF) 4 mg/2 mL injection    PRN Laci Smalls CRNA   4 mg at 07/07/15 0846     Orders Placed This Encounter   Procedures    CPAP/BIPAP SUPPLIES     Order Specific Question:   Length of need (1-99 months):     Answer:   99     Order Specific Question:   Choose ONE mask type and its corresponding cushions and/or pillows:     Answer:    Nasal Mask, 1 per 90 days:  Nasal Cushions, (6 per 90 days):  Nasal Pillows, (6 per 90 days)     Order Specific Question:   Choose EITHER Heated or Non-Heated Tubjing     Answer:    Non-Heated Tubing, 1 per 90 days     Order Specific Question:   All other supplies as needed as listed below:     Answer:    Headgear, 1 per 180 days     Order Specific Question:   All other supplies as needed as listed below:     Answer:    Disposable Filter, 6 per 90 days     Order Specific Question:   All other supplies as needed as listed below:     Answer:    Non-Disposable Filter, 1 per 180 days     Order Specific Question:   All other supplies as needed as listed below:     Answer:    Humidifier Chamber, 1 per 180 days     Order Specific Question:   All other supplies as needed as listed below:     Answer:    Chin Strap, 1 per 180 days    Fraction of  Nitric Oxide     Standing Status:   Future     Standing Expiration Date:   2024     Order Specific Question:   Release to patient     Answer:   Immediate     Plan:     Continue current pulmonary drug regimen.  Compliant with PAP and benefits from use. Follow up annually in the sleep clinic.  Weight loss and exercise to improve overall health.    Problem List Items Addressed This Visit          Pulmonary    Asthma, exercise induced    Relevant Medications    albuterol (VENTOLIN HFA) 90 mcg/actuation inhaler    Other Relevant Orders    Fraction of   Nitric Oxide       Endocrine    Obesity, Class II, BMI 35-39.9, no comorbidity       Other    URBAN (obstructive sleep apnea) - Primary    Relevant Orders    CPAP/BIPAP SUPPLIES                    Elizabeth LeJeune, ACNP, ANP

## 2023-12-16 RX ORDER — LEVOTHYROXINE SODIUM 112 UG/1
112 TABLET ORAL
Qty: 30 TABLET | Refills: 0 | Status: SHIPPED | OUTPATIENT
Start: 2023-12-16 | End: 2024-01-11

## 2023-12-16 NOTE — TELEPHONE ENCOUNTER
Refill Routing Note   Medication(s) are not appropriate for processing by [facility] for the following reason(s):      No participating PCP listed in Epic: routing to Dr. Schroeder as last prescribing provider  Requirement: Established primary provider participating in [facility] program    [facility] action(s):  Route Appointment due            Appointments  past 12m or future 3m with PCP    Date Provider   Last Visit   8/18/2021 Alyce Schroeder MD   Next Visit   Visit date not found Alyce Schroeder MD   ED visits in past 90 days: 0        Note composed:8:17 PM 12/15/2023

## 2024-01-11 RX ORDER — LEVOTHYROXINE SODIUM 112 UG/1
TABLET ORAL
Qty: 30 TABLET | Refills: 0 | Status: SHIPPED | OUTPATIENT
Start: 2024-01-11 | End: 2024-02-12 | Stop reason: SDUPTHER

## 2024-01-11 NOTE — TELEPHONE ENCOUNTER
Refill Routing Note   Medication(s) are not appropriate for processing by [facility] for the following reason(s):        Non-participating provider    [facility] action(s):  Route      Medication Therapy Plan: PCP OUTSIDE OF SCOPE, DEFER TO YOU FOR REVIEW      Appointments  past 12m or future 3m with PCP    Date Provider   Last Visit   Visit date not found Rose Copeland MD   Next Visit   Visit date not found Rose Copeland MD   ED visits in past 90 days: 0        Note composed:10:30 AM 01/11/2024

## 2024-02-11 NOTE — TELEPHONE ENCOUNTER
Refill Routing Note   Medication(s) are not appropriate for processing by [facility] for the following reason(s):        Non-participating provider; Unable to route refill request to listed PCP. Routing to last [facility] prescriber ( Dr. AVILA ) for review.    [facility] action(s):  Route               Appointments  past 12m or future 3m with PCP    Date Provider   Last Visit   Visit date not found Rose Copeland MD   Next Visit   Visit date not found Rose Copeland MD   ED visits in past 90 days: 0        Note composed:11:52 AM 02/11/2024

## 2024-02-12 RX ORDER — LEVOTHYROXINE SODIUM 112 UG/1
TABLET ORAL
Qty: 30 TABLET | Refills: 0 | Status: SHIPPED | OUTPATIENT
Start: 2024-02-12 | End: 2024-03-12

## 2024-02-13 DIAGNOSIS — F32.A ANXIETY AND DEPRESSION: ICD-10-CM

## 2024-02-13 DIAGNOSIS — F41.9 ANXIETY AND DEPRESSION: ICD-10-CM

## 2024-02-15 RX ORDER — FLUOXETINE HYDROCHLORIDE 40 MG/1
CAPSULE ORAL
Qty: 180 CAPSULE | Refills: 0 | Status: SHIPPED | OUTPATIENT
Start: 2024-02-15

## 2024-02-15 NOTE — TELEPHONE ENCOUNTER
Refill Routing Note   Medication(s) are not appropriate for processing by [facility] for the following reason(s):        Responsible provider unclear    [facility] action(s):  Defer               Appointments  past 12m or future 3m with PCP    Date Provider   Last Visit   8/18/2021 Alyce Schroeder MD   Next Visit   Visit date not found Alyce Schroeder MD   ED visits in past 90 days: 0        Note composed:4:34 AM 02/15/2024

## 2024-03-10 DIAGNOSIS — E03.9 ACQUIRED HYPOTHYROIDISM: Primary | ICD-10-CM

## 2024-03-11 NOTE — TELEPHONE ENCOUNTER
Refill Routing Note   Medication(s) are not appropriate for processing by [facility] for the following reason(s):        Non-participating provider; Unable to route refill request to listed PCP. Routing to last [facility] prescriber ( Dr. AVILA ) for review.    OR action(s):  Route               Appointments  past 12m or future 3m with PCP    Date Provider   Last Visit   Visit date not found Rose Copeland MD   Next Visit   Visit date not found Rose Copeland MD   ED visits in past 90 days: 0        Note composed:10:41 PM 03/10/2024

## 2024-03-12 RX ORDER — LEVOTHYROXINE SODIUM 112 UG/1
112 TABLET ORAL
Qty: 90 TABLET | Refills: 0 | Status: SHIPPED | OUTPATIENT
Start: 2024-03-12

## 2024-06-16 DIAGNOSIS — F32.A ANXIETY AND DEPRESSION: ICD-10-CM

## 2024-06-16 DIAGNOSIS — F41.9 ANXIETY AND DEPRESSION: ICD-10-CM

## 2024-06-17 RX ORDER — FLUOXETINE HYDROCHLORIDE 40 MG/1
CAPSULE ORAL
Qty: 180 CAPSULE | Refills: 0 | OUTPATIENT
Start: 2024-06-17

## 2024-11-18 ENCOUNTER — OFFICE VISIT (OUTPATIENT)
Dept: PULMONOLOGY | Facility: CLINIC | Age: 59
End: 2024-11-18
Payer: COMMERCIAL

## 2024-11-18 VITALS
HEIGHT: 64 IN | DIASTOLIC BLOOD PRESSURE: 64 MMHG | SYSTOLIC BLOOD PRESSURE: 112 MMHG | WEIGHT: 168.19 LBS | BODY MASS INDEX: 28.71 KG/M2 | RESPIRATION RATE: 17 BRPM | OXYGEN SATURATION: 98 %

## 2024-11-18 DIAGNOSIS — G47.33 OSA (OBSTRUCTIVE SLEEP APNEA): Primary | ICD-10-CM

## 2024-11-18 DIAGNOSIS — J45.909 ASTHMA, UNSPECIFIED ASTHMA SEVERITY, UNSPECIFIED WHETHER COMPLICATED, UNSPECIFIED WHETHER PERSISTENT: ICD-10-CM

## 2024-11-18 PROCEDURE — 1159F MED LIST DOCD IN RCRD: CPT | Mod: CPTII,S$GLB,, | Performed by: NURSE PRACTITIONER

## 2024-11-18 PROCEDURE — 99999 PR PBB SHADOW E&M-EST. PATIENT-LVL IV: CPT | Mod: PBBFAC,,, | Performed by: NURSE PRACTITIONER

## 2024-11-18 PROCEDURE — 3078F DIAST BP <80 MM HG: CPT | Mod: CPTII,S$GLB,, | Performed by: NURSE PRACTITIONER

## 2024-11-18 PROCEDURE — 3044F HG A1C LEVEL LT 7.0%: CPT | Mod: CPTII,S$GLB,, | Performed by: NURSE PRACTITIONER

## 2024-11-18 PROCEDURE — 3074F SYST BP LT 130 MM HG: CPT | Mod: CPTII,S$GLB,, | Performed by: NURSE PRACTITIONER

## 2024-11-18 PROCEDURE — 1160F RVW MEDS BY RX/DR IN RCRD: CPT | Mod: CPTII,S$GLB,, | Performed by: NURSE PRACTITIONER

## 2024-11-18 PROCEDURE — 99214 OFFICE O/P EST MOD 30 MIN: CPT | Mod: S$GLB,,, | Performed by: NURSE PRACTITIONER

## 2024-11-18 PROCEDURE — 3008F BODY MASS INDEX DOCD: CPT | Mod: CPTII,S$GLB,, | Performed by: NURSE PRACTITIONER

## 2024-11-18 NOTE — PROGRESS NOTES
"Subjective:      Patient ID: Taryn Serrano is a 59 y.o. female.    Chief Complaint: Sleep Apnea    HPI  Presents for URBAN and asthma. Asthma controlled with Trelegy.  No fever, chills, or hemoptysis. No pleuritic type chest pain. Breathing is stable as compared to 6 months ago.  Benefits from APAP. She has had weight loss but still has snoring if falls asleep without APAP.     Patient Active Problem List   Diagnosis    Circadian rhythm sleep disorder, irregular sleep-wake type    Paving stone degeneration of peripheral retina    Periodic limb movement disorder    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    Asthma, exercise induced    URBAN (obstructive sleep apnea)    Acquired hypothyroidism    Anxiety and depression    Vitamin D deficiency    Pseudophakia of both eyes    Dry eye    PCO (posterior capsular opacification)    Obesity, Class II, BMI 35-39.9, no comorbidity    Synovial cyst of left popliteal space    Left ACL tear    Tear of medial meniscus of left knee    Other hyperlipidemia    CKD (chronic kidney disease) stage 3, GFR 30-59 ml/min    Osteopenia    Bunion, left foot     /64 (Patient Position: Sitting)   Resp 17   Ht 5' 4" (1.626 m)   Wt 76.3 kg (168 lb 3.4 oz)   LMP 01/07/2015   SpO2 98%   BMI 28.87 kg/m²   Body mass index is 28.87 kg/m².    Review of Systems   Constitutional:  Positive for weight loss.   HENT: Negative.     Respiratory: Negative.     Cardiovascular: Negative.    Musculoskeletal: Negative.    Gastrointestinal: Negative.    Neurological: Negative.    Psychiatric/Behavioral: Negative.       Objective:      Physical Exam  Constitutional:       Appearance: Normal appearance. She is well-developed.   HENT:      Head: Normocephalic and atraumatic.      Nose: Nose normal.   Cardiovascular:      Rate and Rhythm: Normal rate and regular rhythm.      Heart sounds: No murmur heard.     No gallop.   Pulmonary:      Effort: Pulmonary effort is normal.      Breath " sounds: Normal breath sounds.   Abdominal:      Palpations: Abdomen is soft.      Tenderness: There is no abdominal tenderness.   Musculoskeletal:         General: Normal range of motion.      Cervical back: Normal range of motion and neck supple.   Skin:     General: Skin is warm and dry.   Neurological:      Mental Status: She is alert and oriented to person, place, and time.   Psychiatric:         Mood and Affect: Mood normal.         Behavior: Behavior normal.       Personal Diagnostic Review      11/18/2024    11:53 AM   EPWORTH SLEEPINESS SCALE   Sitting and reading 0   Watching TV 0   Sitting, inactive in a public place (e.g. a theatre or a meeting) 0   As a passenger in a car for an hour without a break 0   Lying down to rest in the afternoon when circumstances permit 2   Sitting and talking to someone 0   Sitting quietly after a lunch without alcohol 0   In a car, while stopped for a few minutes in traffic 0   Total score 2          Assessment:       1. URBAN (obstructive sleep apnea)    2. Asthma, unspecified asthma severity, unspecified whether complicated, unspecified whether persistent        Outpatient Encounter Medications as of 11/18/2024   Medication Sig Dispense Refill    albuterol (PROVENTIL/VENTOLIN HFA) 90 mcg/actuation inhaler Inhale 1 puff into the lungs every 6 (six) hours as needed.      albuterol (VENTOLIN HFA) 90 mcg/actuation inhaler Inhale 2 puffs into the lungs every 4 (four) hours as needed for Wheezing. RescueINHALE 1 TO 2 PUFFS BY MOUTH EVERY 6 HOURS AS NEEDED FOR WHEEZE 18 g 1    buPROPion (WELLBUTRIN XL) 300 MG 24 hr tablet Take 300 mg by mouth.      celecoxib (CELEBREX) 200 MG capsule Take 200 mg by mouth.      enoxaparin sodium (LOVENOX SUBQ) Lovenox Take No date recorded No form recorded No frequency recorded No route recorded No set duration recorded No set duration amount recorded active No dosage strength recorded No dosage strength units of measure recorded      levothyroxine  (SYNTHROID) 112 MCG tablet TAKE 1 TABLET BY MOUTH BEFORE BREAKFAST 90 tablet 0    TRELEGY ELLIPTA 200-62.5-25 mcg inhaler INHALE 1 PUFF INTO THE LUNGS ONCE DAILY. 60 each 11    FLUoxetine 40 MG capsule TAKE 2 CAPSULES BY MOUTH EVERY  capsule 0     Facility-Administered Encounter Medications as of 2024   Medication Dose Route Frequency Provider Last Rate Last Admin    ondansetron HCl (PF) 4 mg/2 mL injection    PRN Laci Smalls CRNA   4 mg at 07/07/15 0846     Orders Placed This Encounter   Procedures    CPAP/BIPAP SUPPLIES     Order Specific Question:   Length of need (1-99 months):     Answer:   99     Order Specific Question:   Choose ONE mask type and its corresponding cushions and/or pillows:     Answer:    Nasal Mask, 1 per 90 days:  Nasal Cushions, (6 per 90 days):  Nasal Pillows, (6 per 90 days)     Order Specific Question:   Choose EITHER Heated or Non-Heated Tubjing     Answer:    Non-Heated Tubing, 1 per 90 days     Order Specific Question:   All other supplies as needed as listed below:     Answer:    Headgear, 1 per 180 days     Order Specific Question:   All other supplies as needed as listed below:     Answer:    Disposable Filter, 6 per 90 days     Order Specific Question:   All other supplies as needed as listed below:     Answer:    Non-Disposable Filter, 1 per 180 days     Order Specific Question:   All other supplies as needed as listed below:     Answer:    Humidifier Chamber, 1 per 180 days     Order Specific Question:   All other supplies as needed as listed below:     Answer:    Chin Strap, 1 per 180 days    Fraction of  Nitric Oxide     Standing Status:   Future     Standing Expiration Date:   2025     Order Specific Question:   Release to patient     Answer:   Immediate    Spirometry without Bronchodilator     Standing Status:   Future     Standing Expiration Date:   2025     Order Specific Question:   Release to  patient     Answer:   Immediate     Plan:     Benefits from APAP use and compliant.   Supply order.   Follow up one year.   Continue current pulmonary drug regimen.    1. URBAN (obstructive sleep apnea)  -     CPAP/BIPAP SUPPLIES    2. Asthma, unspecified asthma severity, unspecified whether complicated, unspecified whether persistent  -     Fraction of  Nitric Oxide; Future  -     Spirometry without Bronchodilator; Future                        Elizabeth LeJeune, VIRGINIEP, ANP

## 2025-04-30 ENCOUNTER — PATIENT MESSAGE (OUTPATIENT)
Dept: PULMONOLOGY | Facility: CLINIC | Age: 60
End: 2025-04-30
Payer: COMMERCIAL

## 2025-07-18 DIAGNOSIS — M79.641 RIGHT HAND PAIN: Primary | ICD-10-CM

## 2025-07-23 ENCOUNTER — OFFICE VISIT (OUTPATIENT)
Dept: ORTHOPEDICS | Facility: CLINIC | Age: 60
End: 2025-07-23
Payer: COMMERCIAL

## 2025-07-23 ENCOUNTER — HOSPITAL ENCOUNTER (OUTPATIENT)
Dept: RADIOLOGY | Facility: HOSPITAL | Age: 60
Discharge: HOME | End: 2025-07-23
Attending: ORTHOPAEDIC SURGERY
Payer: COMMERCIAL

## 2025-07-23 VITALS — WEIGHT: 168.19 LBS | BODY MASS INDEX: 28.71 KG/M2 | HEIGHT: 64 IN

## 2025-07-23 DIAGNOSIS — M79.641 RIGHT HAND PAIN: ICD-10-CM

## 2025-07-23 DIAGNOSIS — M65.30 TRIGGER FINGER, ACQUIRED: Primary | ICD-10-CM

## 2025-07-23 PROCEDURE — 99999 PR PBB SHADOW E&M-EST. PATIENT-LVL II: CPT | Mod: PBBFAC,,, | Performed by: ORTHOPAEDIC SURGERY

## 2025-07-23 PROCEDURE — 73130 X-RAY EXAM OF HAND: CPT | Mod: TC,RT

## 2025-07-23 PROCEDURE — 73130 X-RAY EXAM OF HAND: CPT | Mod: 26,RT,, | Performed by: RADIOLOGY

## 2025-07-23 RX ORDER — TRIAMCINOLONE ACETONIDE 40 MG/ML
40 INJECTION, SUSPENSION INTRA-ARTICULAR; INTRAMUSCULAR
Status: DISCONTINUED | OUTPATIENT
Start: 2025-07-23 | End: 2025-07-23 | Stop reason: HOSPADM

## 2025-07-23 RX ADMIN — TRIAMCINOLONE ACETONIDE 40 MG: 40 INJECTION, SUSPENSION INTRA-ARTICULAR; INTRAMUSCULAR at 01:07

## 2025-07-23 NOTE — PROGRESS NOTES
Subjective:     Patient ID: Taryn Serrano is a 60 y.o. female.    Chief Complaint: No chief complaint on file.      HPI:  The patient is a 60-year-old female status post bilateral carpal tunnel release around  at the [facility].  Records are unavailable.  She did well with that surgery.  Currently she has a right thumb and long trigger finger she wishes to have injected.  Additionally she has Raynaud's syndrome and has pictures of that.  Her sister has rheumatoid arthritis.  She has dry eyes she may have scleroderma.  Her hand radiographs showed penciling of the distal phalanx.    Past Medical History:   Diagnosis Date    Anxiety and depression     Asthma, exercise induced     Dry eyes     History of ADHD     Hypothyroid     URBAN (obstructive sleep apnea)     CPAP    Osteopenia 3/13/2020    Due for repeat dexa scan in .    Other hyperlipidemia 2017    Vitamin D deficiency      Past Surgical History:   Procedure Laterality Date    ANKLE SURGERY Right     BREAST BIOPSY Right     benign    BUNIONECTOMY Right 2024    CARPAL TUNNEL RELEASE Bilateral     CATARACT EXTRACTION BILATERAL W/ ANTERIOR VITRECTOMY      CATARACT EXTRACTION W/  INTRAOCULAR LENS IMPLANT  OU     SECTION      KNEE ARTHROSCOPY Left     KNEE SURGERY      right    LAPIDUS BUNIONECTOMY Left 2020    Procedure: BUNIONECTOMY, LAPIDUS;  Surgeon: Krzysztof Simmons DPM;  Location: [facility] OR;  Service: Podiatry;  Laterality: Left;    LENGTHENING OF ACHILLES TENDON Left 2020    Procedure: LENGTHENING, TENDON, ACHILLES;  Surgeon: Krzysztof Simmons DPM;  Location: [facility] OR;  Service: Podiatry;  Laterality: Left;    OSTEOTOMY OF METATARSAL BONE Left 2020    Procedure: OSTEOTOMY, METATARSAL BONE;  Surgeon: Krzysztof Simmons DPM;  Location: [facility] OR;  Service: Podiatry;  Laterality: Left;  2ND TOE    WISDOM TOOTH EXTRACTION       Family History   Problem Relation Name Age of Onset    Cataracts Father       Alzheimer's disease Father      Colon cancer Father      Heart failure Mother      Melanoma Neg Hx      Psoriasis Neg Hx      Lupus Neg Hx      Eczema Neg Hx      Acne Neg Hx      Breast cancer Neg Hx      Ovarian cancer Neg Hx      Thrombophilia Neg Hx       Social History[1]  Medication List with Changes/Refills   Current Medications    ALBUTEROL (VENTOLIN HFA) 90 MCG/ACTUATION INHALER    Inhale 2 puffs into the lungs every 4 (four) hours as needed for Wheezing. RescueINHALE 1 TO 2 PUFFS BY MOUTH EVERY 6 HOURS AS NEEDED FOR WHEEZE    BUPROPION (WELLBUTRIN XL) 300 MG 24 HR TABLET    Take 300 mg by mouth.    CELECOXIB (CELEBREX) 200 MG CAPSULE    Take 200 mg by mouth.    LEVOTHYROXINE (SYNTHROID) 112 MCG TABLET    TAKE 1 TABLET BY MOUTH BEFORE BREAKFAST    TRELEGY ELLIPTA 200-62.5-25 MCG INHALER    INHALE 1 PUFF INTO THE LUNGS ONCE DAILY.   Discontinued Medications    ALBUTEROL (PROVENTIL/VENTOLIN HFA) 90 MCG/ACTUATION INHALER    Inhale 1 puff into the lungs every 6 (six) hours as needed.    ENOXAPARIN SODIUM (LOVENOX SUBQ)    Lovenox Take No date recorded No form recorded No frequency recorded No route recorded No set duration recorded No set duration amount recorded active No dosage strength recorded No dosage strength units of measure recorded     Review of patient's allergies indicates:   Allergen Reactions    Grass pollen-perennial rye, standard Shortness Of Breath    Horsetail (equisetum arvense) Shortness Of Breath    Mold extracts Shortness Of Breath     Review of Systems   Constitutional: Negative for malaise/fatigue.   HENT:  Negative for hearing loss.    Eyes:  Positive for visual disturbance. Negative for double vision.   Cardiovascular:  Negative for chest pain.   Respiratory:  Positive for sleep disturbances due to breathing and wheezing. Negative for shortness of breath.    Endocrine: Negative for cold intolerance.   Hematologic/Lymphatic: Does not bruise/bleed easily.   Skin:  Negative for poor wound  healing and suspicious lesions.   Musculoskeletal:  Negative for gout, joint pain and joint swelling.   Gastrointestinal:  Negative for nausea and vomiting.   Genitourinary:  Negative for dysuria.   Neurological:  Positive for numbness, paresthesias and sensory change.   Psychiatric/Behavioral:  Positive for depression. Negative for memory loss and substance abuse. The patient has insomnia and is nervous/anxious.    Allergic/Immunologic: Negative for persistent infections.       Objective:   There is no height or weight on file to calculate BMI.  There were no vitals filed for this visit.             General    Constitutional: She is oriented to person, place, and time. She appears well-developed and well-nourished. No distress.   HENT:   Head: Normocephalic.   Eyes: EOM are normal.   Pulmonary/Chest: Effort normal.   Neurological: She is oriented to person, place, and time.   Psychiatric: She has a normal mood and affect.             Right Hand/Wrist Exam     Inspection   Scars: Wrist - present Hand -  present  Effusion: Wrist - absent Hand -  absent    Pain   Hand - The patient exhibits pain of the middle MCP and thumb MCP.    Other     Neuorologic Exam    Median Distribution: normal  Ulnar Distribution: normal  Radial Distribution: normal    Comments:  The patient has a well-healed carpal tunnel scar.  She has active triggering of the right thumb and long finger with palpable nodules that move with tendon excursion.          Vascular Exam       Capillary Refill  Right Hand: normal capillary refill          Relevant imaging results reviewed and interpreted by me, discussed with the patient and / or family today radiographs right hand showed pin sling of the distal phalanx  Assessment:     Right thumb and long trigger fingers   Raynaud's syndrome   Possible scleroderma     Plan:     The patient is injected right thumb and long trigger fingers each with 0.5 cc Kenalog and 0.5 cc 2% plain lidocaine under sterile  technique.  I have recommended that she see a rheumatologist who diagnosed her autoimmune issues.                Disclaimer: This note was prepared using a voice recognition system and is likely to have sound alike errors within the text.          [1]   Social History  Socioeconomic History    Marital status:     Number of children: 2   Occupational History     Employer: [employer]   Tobacco Use    Smoking status: Never    Smokeless tobacco: Never   Substance and Sexual Activity    Alcohol use: Yes     Comment: socially  No alcohol prior to surgery    Drug use: No    Sexual activity: Yes   Other Topics Concern    Are you pregnant or think you may be? No    Breast-feeding No   Social History Narrative    Full time emplyed.     Social Drivers of Health     Stress: No Stress Concern Present (8/20/2019)    [employer] - Occupational Stress Questionnaire     Feeling of Stress : Not at all

## 2025-07-23 NOTE — PROCEDURES
Tendon Sheath    Date/Time: 7/23/2025 1:30 PM    Performed by: Yamil Zhong MD  Authorized by: Yamil Zhong MD    Consent Done?:  Yes (Verbal)  Indications:  Pain  Timeout: prior to procedure the correct patient, procedure, and site was verified    Prep: patient was prepped and draped in usual sterile fashion      Local anesthesia used?: Yes    Local anesthetic:  Lidocaine 2% without epinephrine  Anesthetic total (ml):  0.5    Location:  Thumb  Site:  R thumb flexor tendon sheath  Ultrasonic guidance for needle placement?: No    Needle size:  25 G  Approach:  Volar  Medications:  40 mg triamcinolone acetonide 40 mg/mL

## 2025-07-23 NOTE — PROCEDURES
Tendon Sheath    Date/Time: 7/23/2025 1:30 PM    Performed by: Yamil Zhong MD  Authorized by: Yamil Zhong MD    Consent Done?:  Yes (Verbal)  Indications:  Pain  Timeout: prior to procedure the correct patient, procedure, and site was verified    Prep: patient was prepped and draped in usual sterile fashion      Local anesthesia used?: Yes    Local anesthetic:  Lidocaine 2% without epinephrine  Anesthetic total (ml):  0.5    Location:  Long finger  Site:  R long flexor tendon sheath  Ultrasonic guidance for needle placement?: No    Needle size:  25 G  Approach:  Volar  Medications:  40 mg triamcinolone acetonide 40 mg/mL

## 2025-08-21 ENCOUNTER — HOSPITAL ENCOUNTER (OUTPATIENT)
Dept: RADIOLOGY | Facility: HOSPITAL | Age: 60
Discharge: HOME | End: 2025-08-21
Attending: PODIATRIST
Payer: COMMERCIAL

## 2025-08-21 ENCOUNTER — OFFICE VISIT (OUTPATIENT)
Dept: PODIATRY | Facility: CLINIC | Age: 60
End: 2025-08-21
Payer: COMMERCIAL

## 2025-08-21 VITALS — WEIGHT: 168.19 LBS | BODY MASS INDEX: 28.71 KG/M2 | HEIGHT: 64 IN

## 2025-08-21 DIAGNOSIS — Z01.818 PREOP EXAMINATION: ICD-10-CM

## 2025-08-21 DIAGNOSIS — T81.9XXA FAILED BUNIONECTOMY, INITIAL ENCOUNTER: Primary | ICD-10-CM

## 2025-08-21 DIAGNOSIS — M20.42 HAMMER TOES OF BOTH FEET: ICD-10-CM

## 2025-08-21 DIAGNOSIS — M79.671 PAIN IN RIGHT FOOT: ICD-10-CM

## 2025-08-21 DIAGNOSIS — M79.672 PAIN IN LEFT FOOT: ICD-10-CM

## 2025-08-21 DIAGNOSIS — M77.41 METATARSALGIA, RIGHT FOOT: ICD-10-CM

## 2025-08-21 DIAGNOSIS — Z01.818 PREOP TESTING: ICD-10-CM

## 2025-08-21 DIAGNOSIS — M24.571 EQUINUS CONTRACTURE OF RIGHT ANKLE: ICD-10-CM

## 2025-08-21 DIAGNOSIS — M79.675 PAIN OF LEFT GREAT TOE: ICD-10-CM

## 2025-08-21 DIAGNOSIS — M20.41 HAMMER TOES OF BOTH FEET: ICD-10-CM

## 2025-08-21 PROCEDURE — 99999 PR PBB SHADOW E&M-EST. PATIENT-LVL V: CPT | Mod: PBBFAC,,, | Performed by: PODIATRIST

## 2025-08-21 PROCEDURE — 71046 X-RAY EXAM CHEST 2 VIEWS: CPT | Mod: TC

## 2025-08-21 PROCEDURE — 73630 X-RAY EXAM OF FOOT: CPT | Mod: TC,50

## 2025-08-21 PROCEDURE — 71046 X-RAY EXAM CHEST 2 VIEWS: CPT | Mod: 26,,, | Performed by: STUDENT IN AN ORGANIZED HEALTH CARE EDUCATION/TRAINING PROGRAM

## 2025-08-28 ENCOUNTER — PATIENT MESSAGE (OUTPATIENT)
Dept: PODIATRY | Facility: CLINIC | Age: 60
End: 2025-08-28
Payer: COMMERCIAL

## 2025-09-03 ENCOUNTER — OFFICE VISIT (OUTPATIENT)
Dept: INTERNAL MEDICINE | Facility: CLINIC | Age: 60
End: 2025-09-03
Payer: COMMERCIAL

## 2025-09-03 ENCOUNTER — HOSPITAL ENCOUNTER (OUTPATIENT)
Dept: CARDIOLOGY | Facility: HOSPITAL | Age: 60
Discharge: HOME | End: 2025-09-03
Payer: COMMERCIAL

## 2025-09-03 VITALS
RESPIRATION RATE: 12 BRPM | SYSTOLIC BLOOD PRESSURE: 106 MMHG | TEMPERATURE: 97 F | DIASTOLIC BLOOD PRESSURE: 65 MMHG | OXYGEN SATURATION: 100 % | HEART RATE: 63 BPM

## 2025-09-03 DIAGNOSIS — R94.31 ABNORMAL EKG: ICD-10-CM

## 2025-09-03 DIAGNOSIS — M24.571 EQUINUS CONTRACTURE OF RIGHT ANKLE: ICD-10-CM

## 2025-09-03 DIAGNOSIS — G47.33 OSA (OBSTRUCTIVE SLEEP APNEA): ICD-10-CM

## 2025-09-03 DIAGNOSIS — F41.9 ANXIETY AND DEPRESSION: ICD-10-CM

## 2025-09-03 DIAGNOSIS — T81.9XXA FAILED BUNIONECTOMY, INITIAL ENCOUNTER: Primary | ICD-10-CM

## 2025-09-03 DIAGNOSIS — F32.A ANXIETY AND DEPRESSION: ICD-10-CM

## 2025-09-03 DIAGNOSIS — Z01.818 PRE-OP EVALUATION: ICD-10-CM

## 2025-09-03 DIAGNOSIS — T81.9XXA FAILED BUNIONECTOMY, INITIAL ENCOUNTER: ICD-10-CM

## 2025-09-03 DIAGNOSIS — E78.49 OTHER HYPERLIPIDEMIA: ICD-10-CM

## 2025-09-03 DIAGNOSIS — E03.9 ACQUIRED HYPOTHYROIDISM: ICD-10-CM

## 2025-09-03 DIAGNOSIS — M20.41 HAMMER TOES OF BOTH FEET: ICD-10-CM

## 2025-09-03 DIAGNOSIS — M20.42 HAMMER TOES OF BOTH FEET: ICD-10-CM

## 2025-09-03 DIAGNOSIS — J45.990 ASTHMA, EXERCISE INDUCED: ICD-10-CM

## 2025-09-03 LAB
OHS QRS DURATION: 80 MS
OHS QTC CALCULATION: 378 MS

## 2025-09-03 PROCEDURE — 99999 PR PBB SHADOW E&M-EST. PATIENT-LVL III: CPT | Mod: PBBFAC,,, | Performed by: NURSE PRACTITIONER

## 2025-09-03 PROCEDURE — 3078F DIAST BP <80 MM HG: CPT | Mod: CPTII,S$GLB,, | Performed by: NURSE PRACTITIONER

## 2025-09-03 PROCEDURE — 93010 ELECTROCARDIOGRAM REPORT: CPT | Mod: ,,, | Performed by: INTERNAL MEDICINE

## 2025-09-03 PROCEDURE — 99204 OFFICE O/P NEW MOD 45 MIN: CPT | Mod: S$GLB,,, | Performed by: NURSE PRACTITIONER

## 2025-09-03 PROCEDURE — 93005 ELECTROCARDIOGRAM TRACING: CPT

## 2025-09-03 PROCEDURE — 3074F SYST BP LT 130 MM HG: CPT | Mod: CPTII,S$GLB,, | Performed by: NURSE PRACTITIONER

## 2025-09-03 RX ORDER — TAMSULOSIN HYDROCHLORIDE 0.4 MG/1
1 CAPSULE ORAL NIGHTLY
COMMUNITY
Start: 2025-02-10

## 2025-09-03 RX ORDER — TRAZODONE HYDROCHLORIDE 50 MG/1
50 TABLET ORAL NIGHTLY
COMMUNITY
Start: 2025-01-30

## 2025-09-03 RX ORDER — SERTRALINE HYDROCHLORIDE 50 MG/1
50 TABLET, FILM COATED ORAL EVERY MORNING
COMMUNITY
Start: 2024-11-12

## 2025-09-03 RX ORDER — ALPRAZOLAM 0.5 MG/1
0.5 TABLET ORAL 2 TIMES DAILY PRN
COMMUNITY
Start: 2024-12-16 | End: 2025-12-16

## (undated) DEVICE — SEE MEDLINE ITEM 146298

## (undated) DEVICE — SOL IRR NACL .9% 3000ML

## (undated) DEVICE — SEE MEDLINE ITEM 146292

## (undated) DEVICE — BLADE LONG 31.0MM X 9.0MM

## (undated) DEVICE — DRAPE MOBILE C-ARM

## (undated) DEVICE — GLOVE PROTEXIS HYDROGEL SZ6.5

## (undated) DEVICE — NDL SAFETY 25G X 1.5 ECLIPSE

## (undated) DEVICE — GLOVE SURG PLYSPHRN ORTH SZ7.5

## (undated) DEVICE — UNDERGLOVES BIOGEL PI SIZE 8

## (undated) DEVICE — SUT FIBERWIRE 2-0 50 BLK

## (undated) DEVICE — PAD ABD 8X10 STERILE

## (undated) DEVICE — SEE MEDLINE ITEM 157027

## (undated) DEVICE — SYR 10CC LUER LOCK

## (undated) DEVICE — SCRUB 10% POVIDONE IODINE 4OZ

## (undated) DEVICE — CHLORAPREP 10.5 ML APPLICATOR

## (undated) DEVICE — TOURNIQUET SB QC DP 18X4IN

## (undated) DEVICE — DRAPE PLASTIC U 60X72

## (undated) DEVICE — SEE MEDLINE ITEM 146308

## (undated) DEVICE — SPONGE DERMACEA GAUZE 4X4

## (undated) DEVICE — SEE MEDLINE ITEM 157131

## (undated) DEVICE — SEE MEDLINE ITEM 157216

## (undated) DEVICE — SEE MEDLINE ITEM 146345

## (undated) DEVICE — SUT VICRYL 3-0 8-18 PS-1

## (undated) DEVICE — COTTON ROLL ABSORBENT 1 LB ST

## (undated) DEVICE — SPONGE DERMACEA 4X4IN 12PLY

## (undated) DEVICE — APPLICATOR CHLORAPREP ORN 26ML

## (undated) DEVICE — SEE MEDLINE ITEM 146231

## (undated) DEVICE — SEE MEDLINE ITEM 157117

## (undated) DEVICE — COVER OVERHEAD SURG LT BLUE

## (undated) DEVICE — SEE MEDLINE ITEM 152523

## (undated) DEVICE — SPLINT CAST ROLL 5IN X 15

## (undated) DEVICE — SUT FIBERWIRE FIBER 2M

## (undated) DEVICE — DRESSING XEROFORM FOIL PK 1X8

## (undated) DEVICE — PAD CAST SPECIALIST STRL 6

## (undated) DEVICE — SUT 4-0 ETHILON 18 PS-2

## (undated) DEVICE — Device

## (undated) DEVICE — NDL SPINAL 18GX3.5 SPINOCAN

## (undated) DEVICE — SUT ETHILON 3-0 PSL 30 BLK

## (undated) DEVICE — SEE MEDLINE ITEM 152529

## (undated) DEVICE — NDL ECLIPSE SAFETY 18GX1-1/2IN

## (undated) DEVICE — MANIFOLD 4 PORT

## (undated) DEVICE — GLOVE BIOGEL PI ORTHO PRO SZ7

## (undated) DEVICE — PROBE MULTI PORT RF 90 DEGREE

## (undated) DEVICE — SEE MEDLINE ITEM 152528

## (undated) DEVICE — GLOVE SURG BIOGEL LATEX SZ 7.5

## (undated) DEVICE — DRAPE EMERALD 87X114.75X113

## (undated) DEVICE — ELECTRODE REM PLYHSV RETURN 9

## (undated) DEVICE — DECANTER VIAL ASEPTIC TRANSFER

## (undated) DEVICE — PAD UNDERPAD 30X30

## (undated) DEVICE — TUBING CROSSFLOW INFLOW CASS

## (undated) DEVICE — WIRE C TROCAR TIP .062
Type: IMPLANTABLE DEVICE | Site: FOOT | Status: NON-FUNCTIONAL
Removed: 2020-09-18

## (undated) DEVICE — BANDAGE ACE DOUBLE STER 6IN

## (undated) DEVICE — SUT VICRYL PLUS 0 CT1 36IN

## (undated) DEVICE — CONTAINER SPECIMEN STRL 4OZ

## (undated) DEVICE — GLOVE PROTEXIS HYDROGEL SZ7

## (undated) DEVICE — GAUZE SPONGE 4X4 12PLY

## (undated) DEVICE — DRAPE SURG W/TWL 17 5/8X23